# Patient Record
Sex: FEMALE | Race: WHITE | NOT HISPANIC OR LATINO | Employment: OTHER | URBAN - METROPOLITAN AREA
[De-identification: names, ages, dates, MRNs, and addresses within clinical notes are randomized per-mention and may not be internally consistent; named-entity substitution may affect disease eponyms.]

---

## 2017-01-18 ENCOUNTER — RECORDS - HEALTHEAST (OUTPATIENT)
Dept: LAB | Facility: CLINIC | Age: 77
End: 2017-01-18

## 2017-01-18 LAB
CHOLEST SERPL-MCNC: 198 MG/DL
FASTING STATUS PATIENT QL REPORTED: NORMAL
HDLC SERPL-MCNC: 51 MG/DL
LDLC SERPL CALC-MCNC: 120 MG/DL
TRIGL SERPL-MCNC: 134 MG/DL

## 2017-02-22 ENCOUNTER — COMMUNICATION - HEALTHEAST (OUTPATIENT)
Dept: VASCULAR SURGERY | Facility: CLINIC | Age: 77
End: 2017-02-22

## 2017-05-11 ENCOUNTER — HOSPITAL ENCOUNTER (OUTPATIENT)
Dept: MAMMOGRAPHY | Facility: HOSPITAL | Age: 77
Discharge: HOME | End: 2017-05-11
Attending: OBSTETRICS & GYNECOLOGY

## 2017-05-11 DIAGNOSIS — Z12.31 VISIT FOR SCREENING MAMMOGRAM: ICD-10-CM

## 2018-01-22 ENCOUNTER — RECORDS - HEALTHEAST (OUTPATIENT)
Dept: LAB | Facility: CLINIC | Age: 78
End: 2018-01-22

## 2018-01-23 LAB
ALBUMIN SERPL-MCNC: 3.9 G/DL (ref 3.5–5)
ALP SERPL-CCNC: 77 U/L (ref 45–120)
ALT SERPL W P-5'-P-CCNC: 23 U/L (ref 0–45)
ANION GAP SERPL CALCULATED.3IONS-SCNC: 9 MMOL/L (ref 5–18)
AST SERPL W P-5'-P-CCNC: 22 U/L (ref 0–40)
BILIRUB SERPL-MCNC: 0.5 MG/DL (ref 0–1)
BUN SERPL-MCNC: 16 MG/DL (ref 8–28)
CALCIUM SERPL-MCNC: 9.9 MG/DL (ref 8.5–10.5)
CHLORIDE BLD-SCNC: 104 MMOL/L (ref 98–107)
CHOLEST SERPL-MCNC: 165 MG/DL
CO2 SERPL-SCNC: 28 MMOL/L (ref 22–31)
CREAT SERPL-MCNC: 0.74 MG/DL (ref 0.6–1.1)
ERYTHROCYTE [DISTWIDTH] IN BLOOD BY AUTOMATED COUNT: 12.7 % (ref 11–14.5)
FASTING STATUS PATIENT QL REPORTED: ABNORMAL
GFR SERPL CREATININE-BSD FRML MDRD: >60 ML/MIN/1.73M2
GLUCOSE BLD-MCNC: 102 MG/DL (ref 70–125)
HCT VFR BLD AUTO: 44.6 % (ref 35–47)
HDLC SERPL-MCNC: 49 MG/DL
HGB BLD-MCNC: 14.6 G/DL (ref 12–16)
LDLC SERPL CALC-MCNC: 94 MG/DL
MCH RBC QN AUTO: 31.5 PG (ref 27–34)
MCHC RBC AUTO-ENTMCNC: 32.7 G/DL (ref 32–36)
MCV RBC AUTO: 96 FL (ref 80–100)
PLATELET # BLD AUTO: 239 THOU/UL (ref 140–440)
PMV BLD AUTO: 11.7 FL (ref 8.5–12.5)
POTASSIUM BLD-SCNC: 4.5 MMOL/L (ref 3.5–5)
PROT SERPL-MCNC: 6.9 G/DL (ref 6–8)
RBC # BLD AUTO: 4.64 MILL/UL (ref 3.8–5.4)
SODIUM SERPL-SCNC: 141 MMOL/L (ref 136–145)
TRIGL SERPL-MCNC: 110 MG/DL
TSH SERPL DL<=0.005 MIU/L-ACNC: 1.87 UIU/ML (ref 0.3–5)
WBC: 6.8 THOU/UL (ref 4–11)

## 2018-06-01 ENCOUNTER — RECORDS - HEALTHEAST (OUTPATIENT)
Dept: ADMINISTRATIVE | Facility: OTHER | Age: 78
End: 2018-06-01

## 2018-06-01 ENCOUNTER — HOSPITAL ENCOUNTER (OUTPATIENT)
Dept: MAMMOGRAPHY | Facility: CLINIC | Age: 78
Discharge: HOME | End: 2018-06-01
Attending: FAMILY MEDICINE

## 2018-06-01 ENCOUNTER — HOSPITAL ENCOUNTER (OUTPATIENT)
Dept: CARDIOLOGY | Facility: HOSPITAL | Age: 78
Discharge: HOME | End: 2018-06-01
Attending: FAMILY MEDICINE

## 2018-06-01 DIAGNOSIS — R00.2 PALPITATIONS: ICD-10-CM

## 2018-06-01 DIAGNOSIS — Z12.31 VISIT FOR SCREENING MAMMOGRAM: ICD-10-CM

## 2018-11-02 ENCOUNTER — RECORDS - HEALTHEAST (OUTPATIENT)
Dept: LAB | Facility: CLINIC | Age: 78
End: 2018-11-02

## 2018-11-03 LAB — BACTERIA SPEC CULT: NO GROWTH

## 2019-03-12 ENCOUNTER — RECORDS - HEALTHEAST (OUTPATIENT)
Dept: LAB | Facility: CLINIC | Age: 79
End: 2019-03-12

## 2019-03-14 LAB — BACTERIA SPEC CULT: NORMAL

## 2019-03-20 ENCOUNTER — RECORDS - HEALTHEAST (OUTPATIENT)
Dept: LAB | Facility: CLINIC | Age: 79
End: 2019-03-20

## 2019-03-20 LAB
ALBUMIN SERPL-MCNC: 3.7 G/DL (ref 3.5–5)
ALP SERPL-CCNC: 83 U/L (ref 45–120)
ALT SERPL W P-5'-P-CCNC: 46 U/L (ref 0–45)
ANION GAP SERPL CALCULATED.3IONS-SCNC: 8 MMOL/L (ref 5–18)
AST SERPL W P-5'-P-CCNC: 29 U/L (ref 0–40)
BASOPHILS # BLD AUTO: 0 THOU/UL (ref 0–0.2)
BASOPHILS NFR BLD AUTO: 0 % (ref 0–2)
BILIRUB SERPL-MCNC: 0.6 MG/DL (ref 0–1)
BUN SERPL-MCNC: 16 MG/DL (ref 8–28)
CALCIUM SERPL-MCNC: 9 MG/DL (ref 8.5–10.5)
CHLORIDE BLD-SCNC: 107 MMOL/L (ref 98–107)
CHOLEST SERPL-MCNC: 171 MG/DL
CO2 SERPL-SCNC: 27 MMOL/L (ref 22–31)
CREAT SERPL-MCNC: 0.76 MG/DL (ref 0.6–1.1)
EOSINOPHIL # BLD AUTO: 0.2 THOU/UL (ref 0–0.4)
EOSINOPHIL NFR BLD AUTO: 2 % (ref 0–6)
ERYTHROCYTE [DISTWIDTH] IN BLOOD BY AUTOMATED COUNT: 12.9 % (ref 11–14.5)
FASTING STATUS PATIENT QL REPORTED: YES
GFR SERPL CREATININE-BSD FRML MDRD: >60 ML/MIN/1.73M2
GLUCOSE BLD-MCNC: 97 MG/DL (ref 70–125)
HCT VFR BLD AUTO: 44.2 % (ref 35–47)
HDLC SERPL-MCNC: 45 MG/DL
HGB BLD-MCNC: 14.6 G/DL (ref 12–16)
LDLC SERPL CALC-MCNC: 95 MG/DL
LYMPHOCYTES # BLD AUTO: 2.5 THOU/UL (ref 0.8–4.4)
LYMPHOCYTES NFR BLD AUTO: 39 % (ref 20–40)
MCH RBC QN AUTO: 31.4 PG (ref 27–34)
MCHC RBC AUTO-ENTMCNC: 33 G/DL (ref 32–36)
MCV RBC AUTO: 95 FL (ref 80–100)
MONOCYTES # BLD AUTO: 0.5 THOU/UL (ref 0–0.9)
MONOCYTES NFR BLD AUTO: 8 % (ref 2–10)
NEUTROPHILS # BLD AUTO: 3.2 THOU/UL (ref 2–7.7)
NEUTROPHILS NFR BLD AUTO: 50 % (ref 50–70)
PLATELET # BLD AUTO: 232 THOU/UL (ref 140–440)
PMV BLD AUTO: 10.9 FL (ref 8.5–12.5)
POTASSIUM BLD-SCNC: 4.2 MMOL/L (ref 3.5–5)
PROT SERPL-MCNC: 6.5 G/DL (ref 6–8)
RBC # BLD AUTO: 4.65 MILL/UL (ref 3.8–5.4)
SODIUM SERPL-SCNC: 142 MMOL/L (ref 136–145)
TRIGL SERPL-MCNC: 156 MG/DL
TSH SERPL DL<=0.005 MIU/L-ACNC: 1.99 UIU/ML (ref 0.3–5)
WBC: 6.3 THOU/UL (ref 4–11)

## 2019-07-01 ENCOUNTER — RECORDS - HEALTHEAST (OUTPATIENT)
Dept: LAB | Facility: CLINIC | Age: 79
End: 2019-07-01

## 2019-07-02 LAB — B BURGDOR IGG+IGM SER QL: 0.07 INDEX VALUE

## 2019-10-12 ENCOUNTER — HOSPITAL ENCOUNTER (OUTPATIENT)
Dept: CT IMAGING | Facility: HOSPITAL | Age: 79
Discharge: HOME | End: 2019-10-12

## 2019-10-12 DIAGNOSIS — R10.32 LLQ ABDOMINAL PAIN: ICD-10-CM

## 2019-10-12 LAB
CREAT BLD-MCNC: 0.7 MG/DL (ref 0.6–1.1)
GFR SERPL CREATININE-BSD FRML MDRD: >60 ML/MIN/1.73M2

## 2019-10-12 ASSESSMENT — MIFFLIN-ST. JEOR: SCORE: 1151.74

## 2020-01-29 ENCOUNTER — AMBULATORY - HEALTHEAST (OUTPATIENT)
Dept: CARDIOLOGY | Facility: CLINIC | Age: 80
End: 2020-01-29

## 2020-01-29 ENCOUNTER — RECORDS - HEALTHEAST (OUTPATIENT)
Dept: ADMINISTRATIVE | Facility: OTHER | Age: 80
End: 2020-01-29

## 2020-02-03 ENCOUNTER — AMBULATORY - HEALTHEAST (OUTPATIENT)
Dept: CARDIOLOGY | Facility: CLINIC | Age: 80
End: 2020-02-03

## 2020-02-03 ENCOUNTER — RECORDS - HEALTHEAST (OUTPATIENT)
Dept: ADMINISTRATIVE | Facility: OTHER | Age: 80
End: 2020-02-03

## 2020-02-06 ENCOUNTER — OFFICE VISIT - HEALTHEAST (OUTPATIENT)
Dept: CARDIOLOGY | Facility: CLINIC | Age: 80
End: 2020-02-06

## 2020-02-06 DIAGNOSIS — R06.09 DYSPNEA ON EXERTION: ICD-10-CM

## 2020-02-06 DIAGNOSIS — R00.2 PALPITATIONS: ICD-10-CM

## 2020-02-06 DIAGNOSIS — I49.3 PVC'S (PREMATURE VENTRICULAR CONTRACTIONS): ICD-10-CM

## 2020-02-06 RX ORDER — LEVOTHYROXINE SODIUM 100 UG/1
100 TABLET ORAL
Status: SHIPPED | COMMUNITY
Start: 2011-08-09

## 2020-02-06 RX ORDER — B-COMPLEX WITH VITAMIN C
1 TABLET ORAL
Status: SHIPPED | COMMUNITY
Start: 2011-08-09

## 2020-02-06 RX ORDER — MAGNESIUM 200 MG
200 TABLET ORAL
Status: SHIPPED | COMMUNITY
Start: 2012-08-15 | End: 2023-03-27

## 2020-02-06 RX ORDER — ATORVASTATIN CALCIUM 10 MG/1
5 TABLET, FILM COATED ORAL AT BEDTIME
Status: SHIPPED | COMMUNITY
Start: 2012-08-15

## 2020-02-06 RX ORDER — MULTIPLE VITAMINS W/ MINERALS TAB 9MG-400MCG
1 TAB ORAL
Status: SHIPPED | COMMUNITY
Start: 2011-08-09

## 2020-02-06 ASSESSMENT — MIFFLIN-ST. JEOR: SCORE: 1158.54

## 2020-02-12 ENCOUNTER — HOSPITAL ENCOUNTER (OUTPATIENT)
Dept: CARDIOLOGY | Facility: HOSPITAL | Age: 80
Discharge: HOME | End: 2020-02-12
Attending: INTERNAL MEDICINE

## 2020-02-12 DIAGNOSIS — R06.09 OTHER FORMS OF DYSPNEA: ICD-10-CM

## 2020-02-12 DIAGNOSIS — I49.3 PVC'S (PREMATURE VENTRICULAR CONTRACTIONS): ICD-10-CM

## 2020-02-12 DIAGNOSIS — R00.2 PALPITATIONS: ICD-10-CM

## 2020-02-12 DIAGNOSIS — R06.09 DYSPNEA ON EXERTION: ICD-10-CM

## 2020-02-12 LAB
CV STRESS CURRENT BP HE: NORMAL
CV STRESS CURRENT HR HE: 103
CV STRESS CURRENT HR HE: 103
CV STRESS CURRENT HR HE: 108
CV STRESS CURRENT HR HE: 114
CV STRESS CURRENT HR HE: 119
CV STRESS CURRENT HR HE: 119
CV STRESS CURRENT HR HE: 120
CV STRESS CURRENT HR HE: 121
CV STRESS CURRENT HR HE: 121
CV STRESS CURRENT HR HE: 73
CV STRESS CURRENT HR HE: 73
CV STRESS CURRENT HR HE: 74
CV STRESS CURRENT HR HE: 75
CV STRESS CURRENT HR HE: 76
CV STRESS CURRENT HR HE: 76
CV STRESS CURRENT HR HE: 77
CV STRESS CURRENT HR HE: 80
CV STRESS CURRENT HR HE: 82
CV STRESS CURRENT HR HE: 83
CV STRESS CURRENT HR HE: 92
CV STRESS CURRENT HR HE: 95
CV STRESS DEVIATION TIME HE: NORMAL
CV STRESS ECHO PERCENT HR HE: NORMAL
CV STRESS EXERCISE STAGE HE: NORMAL
CV STRESS FINAL RESTING BP HE: NORMAL
CV STRESS FINAL RESTING HR HE: 73
CV STRESS MAX HR HE: 124
CV STRESS MAX TREADMILL GRADE HE: 12
CV STRESS MAX TREADMILL SPEED HE: 2.5
CV STRESS PEAK DIA BP HE: NORMAL
CV STRESS PEAK SYS BP HE: NORMAL
CV STRESS PHASE HE: NORMAL
CV STRESS PROTOCOL HE: NORMAL
CV STRESS RESTING PT POSITION HE: NORMAL
CV STRESS RESTING PT POSITION HE: NORMAL
CV STRESS ST DEVIATION AMOUNT HE: NORMAL
CV STRESS ST DEVIATION ELEVATION HE: NORMAL
CV STRESS ST EVELATION AMOUNT HE: NORMAL
CV STRESS TEST TYPE HE: NORMAL
CV STRESS TOTAL STAGE TIME MIN 1 HE: NORMAL
ECHO EJECTION FRACTION ESTIMATED: 60 %
RATE PRESSURE PRODUCT: NORMAL
STRESS ECHO BASELINE DIASTOLIC HE: 90
STRESS ECHO BASELINE HR: 83
STRESS ECHO BASELINE SYSTOLIC BP: 146
STRESS ECHO CALCULATED PERCENT HR: 87 %
STRESS ECHO LAST STRESS DIASTOLIC BP: 80
STRESS ECHO LAST STRESS HR: 119
STRESS ECHO LAST STRESS SYSTOLIC BP: 172
STRESS ECHO POST ESTIMATED WORKLOAD: 7.1
STRESS ECHO POST EXERCISE DUR MIN: 5
STRESS ECHO POST EXERCISE DUR SEC: 58
STRESS ECHO TARGET HR: 143

## 2020-11-04 ENCOUNTER — OFFICE VISIT - HEALTHEAST (OUTPATIENT)
Dept: VASCULAR SURGERY | Facility: CLINIC | Age: 80
End: 2020-11-04

## 2020-11-04 ENCOUNTER — RECORDS - HEALTHEAST (OUTPATIENT)
Dept: VASCULAR ULTRASOUND | Facility: CLINIC | Age: 80
End: 2020-11-04

## 2020-11-04 DIAGNOSIS — I87.2 VENOUS INSUFFICIENCY OF BOTH LOWER EXTREMITIES: ICD-10-CM

## 2020-11-04 DIAGNOSIS — I87.2 VENOUS INSUFFICIENCY (CHRONIC) (PERIPHERAL): ICD-10-CM

## 2020-11-04 DIAGNOSIS — I83.893 VARICOSE VEINS OF BILATERAL LOWER EXTREMITIES WITH OTHER COMPLICATIONS: ICD-10-CM

## 2020-11-04 DIAGNOSIS — I83.91 ASYMPTOMATIC VARICOSE VEINS OF RIGHT LOWER EXTREMITY: ICD-10-CM

## 2020-11-04 DIAGNOSIS — I83.91 SPIDER VEIN OF RIGHT LOWER EXTREMITY: ICD-10-CM

## 2020-11-04 DIAGNOSIS — I83.893 SYMPTOMATIC VARICOSE VEINS OF BOTH LOWER EXTREMITIES: ICD-10-CM

## 2020-11-04 RX ORDER — MAGNESIUM 200 MG
TABLET ORAL
Status: SHIPPED | COMMUNITY
Start: 2020-11-04 | End: 2023-03-11

## 2020-11-04 ASSESSMENT — MIFFLIN-ST. JEOR: SCORE: 1157.64

## 2020-11-16 ENCOUNTER — COMMUNICATION - HEALTHEAST (OUTPATIENT)
Dept: VASCULAR SURGERY | Facility: CLINIC | Age: 80
End: 2020-11-16

## 2020-12-21 ENCOUNTER — COMMUNICATION - HEALTHEAST (OUTPATIENT)
Dept: VASCULAR SURGERY | Facility: CLINIC | Age: 80
End: 2020-12-21

## 2021-01-05 ENCOUNTER — COMMUNICATION - HEALTHEAST (OUTPATIENT)
Dept: VASCULAR SURGERY | Facility: CLINIC | Age: 81
End: 2021-01-05

## 2021-01-12 ENCOUNTER — RECORDS - HEALTHEAST (OUTPATIENT)
Dept: VASCULAR ULTRASOUND | Facility: CLINIC | Age: 81
End: 2021-01-12

## 2021-01-12 DIAGNOSIS — I83.893 VARICOSE VEINS OF BILATERAL LOWER EXTREMITIES WITH OTHER COMPLICATIONS: ICD-10-CM

## 2021-01-12 DIAGNOSIS — I87.2 VENOUS INSUFFICIENCY (CHRONIC) (PERIPHERAL): ICD-10-CM

## 2021-02-10 ENCOUNTER — AMBULATORY - HEALTHEAST (OUTPATIENT)
Dept: NURSING | Facility: CLINIC | Age: 81
End: 2021-02-10

## 2021-03-03 ENCOUNTER — AMBULATORY - HEALTHEAST (OUTPATIENT)
Dept: NURSING | Facility: CLINIC | Age: 81
End: 2021-03-03

## 2021-03-16 ENCOUNTER — OFFICE VISIT - HEALTHEAST (OUTPATIENT)
Dept: VASCULAR SURGERY | Facility: CLINIC | Age: 81
End: 2021-03-16

## 2021-03-16 ENCOUNTER — RECORDS - HEALTHEAST (OUTPATIENT)
Dept: VASCULAR ULTRASOUND | Facility: CLINIC | Age: 81
End: 2021-03-16

## 2021-03-16 DIAGNOSIS — I87.2 VENOUS INSUFFICIENCY (CHRONIC) (PERIPHERAL): ICD-10-CM

## 2021-03-16 DIAGNOSIS — I83.893 VARICOSE VEINS OF BILATERAL LOWER EXTREMITIES WITH OTHER COMPLICATIONS: ICD-10-CM

## 2021-03-16 DIAGNOSIS — I83.91 SPIDER VEIN OF RIGHT LOWER EXTREMITY: ICD-10-CM

## 2021-03-16 DIAGNOSIS — I83.893 SYMPTOMATIC VARICOSE VEINS OF BOTH LOWER EXTREMITIES: ICD-10-CM

## 2021-04-01 ENCOUNTER — COMMUNICATION - HEALTHEAST (OUTPATIENT)
Dept: VASCULAR SURGERY | Facility: CLINIC | Age: 81
End: 2021-04-01

## 2021-05-03 ENCOUNTER — RECORDS - HEALTHEAST (OUTPATIENT)
Dept: LAB | Facility: CLINIC | Age: 81
End: 2021-05-03

## 2021-05-03 LAB
ALBUMIN SERPL-MCNC: 3.8 G/DL (ref 3.5–5)
ALP SERPL-CCNC: 76 U/L (ref 45–120)
ALT SERPL W P-5'-P-CCNC: 25 U/L (ref 0–45)
ANION GAP SERPL CALCULATED.3IONS-SCNC: 9 MMOL/L (ref 5–18)
AST SERPL W P-5'-P-CCNC: 20 U/L (ref 0–40)
BASOPHILS # BLD AUTO: 0 THOU/UL (ref 0–0.2)
BASOPHILS NFR BLD AUTO: 0 % (ref 0–2)
BILIRUB SERPL-MCNC: 0.6 MG/DL (ref 0–1)
BUN SERPL-MCNC: 18 MG/DL (ref 8–28)
CALCIUM SERPL-MCNC: 8.9 MG/DL (ref 8.5–10.5)
CHLORIDE BLD-SCNC: 108 MMOL/L (ref 98–107)
CHOLEST SERPL-MCNC: 161 MG/DL
CO2 SERPL-SCNC: 26 MMOL/L (ref 22–31)
CREAT SERPL-MCNC: 0.74 MG/DL (ref 0.6–1.1)
EOSINOPHIL # BLD AUTO: 0.2 THOU/UL (ref 0–0.4)
EOSINOPHIL NFR BLD AUTO: 3 % (ref 0–6)
ERYTHROCYTE [DISTWIDTH] IN BLOOD BY AUTOMATED COUNT: 13 % (ref 11–14.5)
FASTING STATUS PATIENT QL REPORTED: NO
GFR SERPL CREATININE-BSD FRML MDRD: >60 ML/MIN/1.73M2
GLUCOSE BLD-MCNC: 130 MG/DL (ref 70–125)
HCT VFR BLD AUTO: 44 % (ref 35–47)
HDLC SERPL-MCNC: 45 MG/DL
HGB BLD-MCNC: 14.6 G/DL (ref 12–16)
IMM GRANULOCYTES # BLD: 0 THOU/UL
IMM GRANULOCYTES NFR BLD: 0 %
LDLC SERPL CALC-MCNC: 94 MG/DL
LYMPHOCYTES # BLD AUTO: 2.4 THOU/UL (ref 0.8–4.4)
LYMPHOCYTES NFR BLD AUTO: 40 % (ref 20–40)
MCH RBC QN AUTO: 31.7 PG (ref 27–34)
MCHC RBC AUTO-ENTMCNC: 33.2 G/DL (ref 32–36)
MCV RBC AUTO: 96 FL (ref 80–100)
MONOCYTES # BLD AUTO: 0.4 THOU/UL (ref 0–0.9)
MONOCYTES NFR BLD AUTO: 7 % (ref 2–10)
NEUTROPHILS # BLD AUTO: 2.8 THOU/UL (ref 2–7.7)
NEUTROPHILS NFR BLD AUTO: 49 % (ref 50–70)
PLATELET # BLD AUTO: 196 THOU/UL (ref 140–440)
PMV BLD AUTO: 11.9 FL (ref 8.5–12.5)
POTASSIUM BLD-SCNC: 4.1 MMOL/L (ref 3.5–5)
PROT SERPL-MCNC: 6.4 G/DL (ref 6–8)
RBC # BLD AUTO: 4.6 MILL/UL (ref 3.8–5.4)
SODIUM SERPL-SCNC: 143 MMOL/L (ref 136–145)
TRIGL SERPL-MCNC: 111 MG/DL
TSH SERPL DL<=0.005 MIU/L-ACNC: 1.62 UIU/ML (ref 0.3–5)
WBC: 5.9 THOU/UL (ref 4–11)

## 2021-05-22 ENCOUNTER — HEALTH MAINTENANCE LETTER (OUTPATIENT)
Age: 81
End: 2021-05-22

## 2021-05-25 ENCOUNTER — RECORDS - HEALTHEAST (OUTPATIENT)
Dept: ADMINISTRATIVE | Facility: CLINIC | Age: 81
End: 2021-05-25

## 2021-05-26 ENCOUNTER — RECORDS - HEALTHEAST (OUTPATIENT)
Dept: ADMINISTRATIVE | Facility: CLINIC | Age: 81
End: 2021-05-26

## 2021-05-27 ENCOUNTER — RECORDS - HEALTHEAST (OUTPATIENT)
Dept: ADMINISTRATIVE | Facility: CLINIC | Age: 81
End: 2021-05-27

## 2021-05-27 VITALS — RESPIRATION RATE: 12 BRPM

## 2021-05-28 ENCOUNTER — RECORDS - HEALTHEAST (OUTPATIENT)
Dept: ADMINISTRATIVE | Facility: CLINIC | Age: 81
End: 2021-05-28

## 2021-05-29 ENCOUNTER — RECORDS - HEALTHEAST (OUTPATIENT)
Dept: ADMINISTRATIVE | Facility: CLINIC | Age: 81
End: 2021-05-29

## 2021-05-30 ENCOUNTER — RECORDS - HEALTHEAST (OUTPATIENT)
Dept: ADMINISTRATIVE | Facility: CLINIC | Age: 81
End: 2021-05-30

## 2021-05-31 ENCOUNTER — RECORDS - HEALTHEAST (OUTPATIENT)
Dept: ADMINISTRATIVE | Facility: CLINIC | Age: 81
End: 2021-05-31

## 2021-06-03 VITALS — HEIGHT: 65 IN | BODY MASS INDEX: 24.83 KG/M2 | WEIGHT: 149 LBS

## 2021-06-04 VITALS
BODY MASS INDEX: 26.29 KG/M2 | WEIGHT: 154 LBS | HEIGHT: 64 IN | SYSTOLIC BLOOD PRESSURE: 110 MMHG | DIASTOLIC BLOOD PRESSURE: 66 MMHG | RESPIRATION RATE: 16 BRPM | HEART RATE: 76 BPM

## 2021-06-05 VITALS
HEIGHT: 65 IN | BODY MASS INDEX: 25.04 KG/M2 | HEART RATE: 72 BPM | RESPIRATION RATE: 12 BRPM | SYSTOLIC BLOOD PRESSURE: 130 MMHG | WEIGHT: 150.3 LBS | DIASTOLIC BLOOD PRESSURE: 60 MMHG | TEMPERATURE: 97.9 F

## 2021-06-09 ENCOUNTER — RECORDS - HEALTHEAST (OUTPATIENT)
Dept: ADMINISTRATIVE | Facility: CLINIC | Age: 81
End: 2021-06-09

## 2021-06-12 NOTE — PROGRESS NOTES
saw patient last in 2012, new pain in leg (no redness or swelling).  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 History of RFA and stab phlebectomy.  2008 right GSV RFA,2012 right trivex vein surgery.Has used compression with continued pain,swelling,VV and spider veins.US done and reviewed options.. right GSV RFA option reviewed and will preauth through [facility]. Spoke with patient regading RFA.

## 2021-06-13 NOTE — TELEPHONE ENCOUNTER
Pt left VM wondering what she should be doing for a f/u appt and compression stocking question.    Called pt back and stated that Melida note said follow up if procedure is approved. Pt states understanding that she will get a call if approved to set up. Pt wondering if she needs compression due to the fact that she already has numerous pairs of thigh high and knee high. Informed pt that we recommend changing pairs out every 6 months. Pt states understanding.

## 2021-06-13 NOTE — TELEPHONE ENCOUNTER
Nataliia called and was wondering where she can go to get compression stockings. She has the [employer] catalog but spoke with them and they do not take her insurance. She would like to see if her insurance will cover part of her stocking cost.    Reviewed options of where to go for compression stockings. She would like the [place] location. Gave contact information for:    [place]/ [facility]   [address]   [address]/Orthotics and [address]st[address]  [place] [address]             Phone: [phone]  Fax [phone]

## 2021-06-13 NOTE — TELEPHONE ENCOUNTER
Pt is scheduled for R leg ablation on 1/12. She will need a cortisone injection that same month. She is wondering if it will matter if she gets it the week before or after the ablation. Please advise.

## 2021-06-14 NOTE — PROGRESS NOTES
VNUS Radiofrequency Ablation    Pre-Procedure:  Admit  [x]Consent for Radio Frequency Ablation of the   [x]Right Saphenous Vein and/or branches  []Left Saphenous Vein and/or branches  Vitals  [x]Vital signs per routine    Nursing Orders  [x]Vein Mapping of  [x]R extremity  []L extremity  [x]Sterile Prep to extremity  [x]Obtain Tumescent Solution 500mL (NS 1000mL, 1% Lidocaine w Epi 56mL, 8.4% Sodium Bicarbonate 5.6mL)    Medications  [x]Diazepam (Valium) 5mg PO, May Repeat x 1 for anxiety, relaxtion.    Post-Procedure:  Admission  [x]Post Procedure care - call physician for status update  []Admit to inpatient - Please document reason for admission in chart    Interventions require inpatient services    High risk of deterioration due to comorbidities    High risk of deterioration due to nature of admitting diagnosis  Location:    Vitals  [x]Vital signs per routine    Nursing Orders  [x]Thigh High Compression Stocking 20-30mm or 30-40mm to  [x]R extremity  []L extremity  [x]Check insertion site for bleeding or hematoma.  If bleeding or hematoma occurs, apply pressure and notify MD    Discharge  [x]Discharge home if no complications arise.  [x]Give post radiofrequency ablation discharge instructions to patient.  [x]Follow up venous ultrasound 72-96 hours after procedure.  [x]Follow up appointment with MD at 2 weeks.  [x]Contact MD for further questions

## 2021-06-14 NOTE — TELEPHONE ENCOUNTER
Spoke with patient regarding vein ablation procedure next week. Advised to bring a . Explained it is ok to eat and drink prior to procedure with exception of blood thinner. Verified patient's insurance. We will supply patient with a thigh high compression sock. We carry from size medium to extra large. If patient doesn't fit into them they will need to provide their own. Questions answered. Patient will call if any further questions.

## 2021-06-14 NOTE — PATIENT INSTRUCTIONS - HE
Home Care Instructions Following Radiofrequency Closure of the Greater Saphenous Vein (VNUS)    Dressing    Wear your compression for 48 hours continuously until your ultrasound after the procedure.  You can remove your stocking to shower in 24 hours but then reapply after.    Wear the stocking for two weeks after the procedure while you are up.    Activity    The day of the procedure make sure to walk around the house for a few minutes each hour until bedtime.    The day after the procedure you should advance activity as tolerated.  NO strenuous activities though for 2 weeks.  In general avoid prolonged standing in place and sitting with your legs down.  Both of these activities will cause blood to pool in your legs resulting in more swelling and discomfort.    Do not drive or operate heavy machinery for 24 hours after the procedure.    Do not take baths or use hot tubs or swimming pools until your incision site is healed.    Do not fly for the Next 3 weeks.    Post Procedure Ultrasound    You will need an ultrasound within 2-3 days following the closure procedure.  Please schedule an ultrasound if you have not already done so.    Post Procedure Signs and Symptoms    There can be a mild amount of bruising and numbness after this procedure.  This will typically take a few weeks to fade.    You may feel pain or tenderness in your inner thigh.  Mild pain medication such as Tylenol or Ibuprofen maybe helpful.    It is normal to have fluid leaking from the injection areas the day of the procedure and it may be blood tinged.    Call if you have    Fever greater than 100.8 degrees F.    Uncontrolled bleeding from the incision site.    Pain that is not relieved by rest or pain medication.    Shortness of breath    Follow -up    Please plan to see your surgeon in the office two weeks after your procedure    Call [phone] to schedule this appointment if one has not already been made    You will receive a phone call  from our staff within 48 hours of your procedure.  Please have these instruction near by so that any questions can be addressed at that time.  If you have any concerns before that time, please do not hesitate to call us al [phone] and ask to speak to a nurse.  We want you to have a smooth recovery.    For emergencies after 4:30pm Monday - Friday, holidays and weekends:  For Dr Rogers Bowman call [facility] at [phone]  [facility] [phone]    Patient Education   Diazepam Oral tablet  What is this medicine?  DIAZEPAM (dye AZ e rebecca) is a benzodiazepine. It is used to treat anxiety and nervousness. It also can help treat alcohol withdrawal, relax muscles, and treat certain types of seizures.  This medicine may be used for other purposes; ask your health care provider or pharmacist if you have questions.  What should I tell my health care provider before I take this medicine?  They need to know if you have any of these conditions    an alcohol or drug abuse problem    bipolar disorder, depression, psychosis or other mental health condition    glaucoma    kidney or liver disease    lung or breathing disease    myasthenia gravis    Parkinson's disease    seizures or a history of seizures    suicidal thoughts    an unusual or allergic reaction to diazepam, other benzodiazepines, foods, dyes, or preservatives    pregnant or trying to get pregnant    breast-feeding  How should I use this medicine?  Take this medicine by mouth with a glass of water. Follow the directions on the prescription label. If this medicine upsets your stomach, take it with food or milk. Take your doses at regular intervals. Do not take your medicine more often than directed. If you have been taking this medicine regularly for some time, do not suddenly stop taking it. You must gradually reduce the dose or you may get severe side effects. Ask your doctor or health care professional for advice. Even after you stop  taking this medicine it can still affect your body for several days.  Talk to your pediatrician regarding the use of this medicine in children. Special care may be needed.  Overdosage: If you think you have taken too much of this medicine contact a poison control center or emergency room at once.  NOTE: This medicine is only for you. Do not share this medicine with others.  What if I miss a dose?  If you miss a dose, take it as soon as you can. If it is almost time for your next dose, take only that dose. Do not take double or extra doses.  What may interact with this medicine?    cimetidine    grapefruit juice    herbal or dietary supplements like kava kava, melatonin, Dom's Wort, or valerian    medicines for anxiety or sleeping problems, like alprazolam, lorazepam, or triazolam    medicines for depression, mental problems or psychiatric disturbances    medicines for HIV infection or AIDS    prescription pain medicines    rifampin, rifapentine, or rifabutin    some medicines for seizures like carbamazepine, phenobarbital, phenytoin, or primidone  This list may not describe all possible interactions. Give your health care provider a list of all the medicines, herbs, non-prescription drugs, or dietary supplements you use. Also tell them if you smoke, drink alcohol, or use illegal drugs. Some items may interact with your medicine.  What should I watch for while using this medicine?  Visit your doctor or health care professional for regular checks on your progress. Your body can become dependent on this medicine. Ask your doctor or health care professional if you still need to take it.  You may get drowsy or dizzy. Do not drive, use machinery, or do anything that needs mental alertness until you know how this medicine affects you. To reduce the risk of dizzy and fainting spells, do not stand or sit up quickly, especially if you are an older patient. Alcohol may increase dizziness and drowsiness. Avoid alcoholic  drinks.  Do not treat yourself for coughs, colds or allergies without asking your doctor or health care professional for advice. Some ingredients can increase possible side effects.  What side effects may I notice from receiving this medicine?  Side effects that you should report to your doctor or health care professional as soon as possible:    allergic reactions like skin rash, itching or hives, swelling of the face, lips, or tongue    angry, confused, depressed, other mood changes    breathing problems    feeling faint or lightheaded, falls    muscle cramps    problems with balance, talking, walking    restlessness    tremors    trouble passing urine or change in the amount of urine    unusually weak or tired  Side effects that usually do not require medical attention (report to your doctor or health care professional if they continue or are bothersome):    difficulty sleeping, nightmares    dizziness, drowsiness, clumsiness, or unsteadiness, a hangover effect    headache    nausea, vomiting  This list may not describe all possible side effects. Call your doctor for medical advice about side effects. You may report side effects to FDA at 3-725-FDA-1863.  Where should I keep my medicine?  Keep out of the reach of children. This medicine can be abused. Keep your medicine in a safe place to protect it from theft. Do not share this medicine with anyone. Selling or giving away this medicine is dangerous and against the law.  Store at room temperature between 15 and 30 degrees C (59 and 86 degrees F). Protect from light. Keep container tightly closed. Throw away any unused medicine after the expiration date.  NOTE:This sheet is a summary. It may not cover all possible information. If you have questions about this medicine, talk to your doctor, pharmacist, or health care provider. Copyright  2015 Gold Standard

## 2021-06-15 NOTE — PATIENT INSTRUCTIONS - HE
Options of right greater saphenous vein ligation and removal at the junction were discussed.  I do not think this is urgent or emergent and we could just watch this and follow this in and do this in the future if need be.  Continue her compression on a regular basis  Call for questions concerns or issues

## 2021-06-16 PROBLEM — K57.32 DIVERTICULITIS OF COLON: Status: ACTIVE | Noted: 2020-11-04

## 2021-06-16 PROBLEM — I49.3 PVC'S (PREMATURE VENTRICULAR CONTRACTIONS): Status: ACTIVE | Noted: 2020-02-06

## 2021-06-16 PROBLEM — R00.2 PALPITATIONS: Status: ACTIVE | Noted: 2020-02-06

## 2021-06-16 PROBLEM — R06.09 DYSPNEA ON EXERTION: Status: ACTIVE | Noted: 2020-02-06

## 2021-06-16 PROBLEM — I10 BENIGN ESSENTIAL HYPERTENSION: Status: ACTIVE | Noted: 2020-11-04

## 2021-06-16 PROBLEM — M19.90 DEGENERATIVE ARTHRITIS: Status: ACTIVE | Noted: 2020-11-04

## 2021-06-16 PROBLEM — I83.93 VARICOSE VEINS OF BOTH LOWER EXTREMITIES: Status: ACTIVE | Noted: 2020-11-04

## 2021-06-16 PROBLEM — K21.9 GASTROESOPHAGEAL REFLUX DISEASE: Status: ACTIVE | Noted: 2020-11-04

## 2021-06-16 PROBLEM — E78.5 HYPERLIPIDEMIA: Status: ACTIVE | Noted: 2020-11-04

## 2021-06-16 PROBLEM — E03.9 HYPOTHYROIDISM: Status: ACTIVE | Noted: 2020-11-04

## 2021-06-16 PROBLEM — H25.099 SENILE INCIPIENT CATARACT: Status: ACTIVE | Noted: 2020-11-04

## 2021-06-16 NOTE — PROGRESS NOTES
[facility] Surgery Follow up    HPI:    80 y.o. year old female who returns for a follow up.  She had attempted closure on that right greater saphenous vein but were unable to successfully do it secondary to inability to pass a wire or catheter through the greater saphenous vein stenotic areas.  She has a lot of dilated saphenofemoral junction and is here for follow-up ultrasound to see if this is dilated up since her last attempt.  She is wearing compression socks wears knee-high's on a regular basis.    Allergies:Fosamax [alendronate], Metronidazole, and Sulfa (sulfonamide antibiotics)    Past Medical History:   Diagnosis Date     Benign essential hypertension 11/4/2020     Breast cyst 2014     Colon polyp      Degenerative arthritis 11/4/2020     Diverticulitis of colon 11/4/2020     Dyspnea on exertion 2/6/2020     Gastroesophageal reflux disease 11/4/2020     Hyperlipidemia 11/4/2020     Hypothyroidism 11/4/2020     Inverted nipple     hx of inverted nipples     Palpitations 2/6/2020     PVC's (premature ventricular contractions) 2/6/2020     Senile incipient cataract 11/4/2020     Varicose veins of both lower extremities 11/4/2020       Past Surgical History:   Procedure Laterality Date     BREAST BIOPSY Left 2009     BREAST BIOPSY Right 2014    benign     BREAST CYST ASPIRATION Right 2014     TUBAL LIGATION       VARICOSE VEIN SURGERY         CURRENT MEDS:  Current Outpatient Medications on File Prior to Visit   Medication Sig Dispense Refill     ascorbic acid, vitamin C, (VITAMIN C) 1000 MG tablet Take 1,000 mg by mouth daily.       ascorbic acid, vitamin C, (VITAMIN C) 1000 MG tablet 1 tab(s) orally once a day       aspirin 81 MG EC tablet 1       aspirin-calcium carbonate 81 mg-300 mg calcium(777 mg) Tab Take 81 mg by mouth daily.       atorvastatin (LIPITOR) 10 MG tablet Take 5 mg by mouth at bedtime.       calcium carbonate (CALCIUM 600 ORAL) Take 600 mg by mouth daily.       levothyroxine (SYNTHROID,  LEVOTHROID) 100 MCG tablet Take 100 mcg by mouth 4 (four) times a week.       magnesium 200 mg Tab Take 200 mg by mouth daily.       magnesium 200 mg Tab 1 tab(s)       multivitamin with minerals (HAIR,SKIN AND NAILS ORAL) Take 1 tablet by mouth daily.       multivitamin-minerals-lutein (CENTRUM SILVER) tablet Take 1 tablet by mouth daily.       omega 3-dha-epa-fish oil (FISH OIL) 1,000 mg (120 mg-180 mg) cap Take 1 capsule by mouth daily.       omeprazole (PRILOSEC) 20 MG capsule Take 20 mg by mouth 4 (four) times a week.       No current facility-administered medications on file prior to visit.        Family History   Problem Relation Age of Onset     Pancreatic cancer Mother 92     Colon cancer Sister 70     Colon cancer Maternal Grandfather      Prostate cancer Paternal Grandfather      Breast cancer Cousin         paternal half cousin, onset in 30s     Breast cancer Cousin         paternal half cousin, onset in 50s     Breast cancer Cousin 40        paternal half cousin     Skin cancer Brother      Brain cancer Maternal Uncle          at 39     Colon cancer Paternal Uncle      Stomach cancer Cousin 50        paternal half cousin     Cancer Cousin         paternal half cousin, sinus cancer     Breast cancer Cousin         maternal first-cousin, onset 40s     Lung cancer Cousin         maternal first-cousin,  around 50     Lung cancer Cousin         maternal first-cousin     Lung cancer Cousin         materanl first-cousin     Lung cancer Cousin         maternal first-cousin,  at 39     Cancer Maternal Uncle         throat cancer     Cancer Maternal Uncle         type not specified     Skin cancer Brother      Colon cancer Other         paternal half uncle     Pancreatic cancer Other         paternal half uncle     Colon cancer Father         onset in 80s        reports that she has quit smoking. She has never used smokeless tobacco.    Review of Systems:  Negative except occasionally some pain but  her pain is greatly improved since she wears socks on a regular basis.Otherwise twelve system of review is negative.      OBJECTIVE:  There were no vitals filed for this visit.  There is no height or weight on file to calculate BMI.    EXAM:  GENERAL: This is a well-developed 80 y.o. female who appears her stated age  HEAD: normocephalic  VASCULAR: Pulses intact, symmetrical upper and lower extremities.  No major varicosities in the right upper thigh.        LABS:  Lab Results   Component Value Date    WBC 6.3 03/20/2019    WBC 6.1 09/22/2015    HGB 14.6 03/20/2019    HCT 44.2 03/20/2019    MCV 95 03/20/2019     03/20/2019     INR/Prothrombin Time      No results found for: HGBA1C  Lab Results   Component Value Date    ALT 46 (H) 03/20/2019    AST 29 03/20/2019    ALKPHOS 83 03/20/2019    BILITOT 0.6 03/20/2019        Images: Directly reviewed the images as they are being done with the ultrasonographer she has a dilated refluxing greater saphenous junction this gets prestenotic the about 4 to 5 cm distal to the junction and then really does not reconstitute very well for a great length of time most onto the knee.    Assessment/Plan:   Status post attempted closure right greater saphenous vein unsuccessful secondary to stenosis the stenosis still maintains and is there today I do not think that she is a candidate for any kind of closure.  I think differently trying to cure this junction we do so with high ligation of her greater saphenous vein done under local MAC anesthesia in same-day surgery setting.  She has no large varicosities or branches office at this this time I do think we could definitely watch this which is what she would like to do.  We will do so and if this starts to have changes of varicosities or branches in the thigh especially where she can visualize those issues I would like her to return.    Return if symptoms worsen or fail to improve.     Rogers Bowman MD  [facility] Department of  Surgery

## 2021-06-16 NOTE — PROGRESS NOTES
Dr Bowman spoke with patient regarding US done today. Was unable to do RFa at last vist. No RFA option at this time. Is not having any pain. Continues to use compression. No Vein branches noted at this time. Only option is to do vein ablation.

## 2021-06-16 NOTE — TELEPHONE ENCOUNTER
Patient received a voicemail from Constance to call the clinic. She only knows the message was left on a Monday but not sure if it was this week or in the past. She asked for a call back if needed.

## 2021-06-16 NOTE — TELEPHONE ENCOUNTER
Spoke with Nataliia. She was cleaning up her voicemail and found a message she thinks is old from Constance who asked her to call clinic. She thinks it was left on Monday 3/15 before her appointment with Dr. Bowman on 3/16. She is doing fine and has no questions at this time.

## 2021-06-18 NOTE — PATIENT INSTRUCTIONS - HE
"Patient Instructions by Manuel Perez RN at 11/4/2020  8:00 AM     Author: Manuel Perez RN Service: -- Author Type: Registered Nurse    Filed: 11/4/2020  8:56 AM Encounter Date: 11/4/2020 Status: Addendum    : Rogers Bowman MD (Physician)    Related Notes: Original Note by Manuel Perez RN (Registered Nurse) filed at 11/4/2020  8:21 AM       We are prescribing some compression stockings for you. I have included different suppliers that should help you get measured and fitting to ensure proper fitting socks. You should wear this socks as much as you can. It is especially important to wear them with long periods of sitting/standing, long car rides or if you will be flying. Compression socks should get refilled every 4-6 months. They do not need to be worn at night while in bed.    If you do a lot of standing it is good to do calf raises to help keep the blood pumping. If you sit a lot at work it is good to get up periodically to walk around. Elevation of the foot of your bed 4-6\" helps the blood return back to where it is needed.        Varicose Veins      Varicose veins are swollen, enlarged veins most often found in the legs. They are usually blue or purple in color and may bulge, twist, and stand out under the skin.  Normally, veins return blood from the body to the heart. The leg veins have one-way valves that prevent blood from flowing backward in the vein. When the valves are weak or damaged, blood backs up in the veins. This may cause some of the veins to swell and bulge and become varicose veins.  Symptoms  Varicose veins may or may not cause symptoms. If symptoms do occur, they can include:    Legs that feel tired, achy, heavy, or itchy    Leg muscle cramps    Skin changes, such as discoloration, dryness, redness, or rash (in more severe cases, you may also have sores on the skin called venous leg ulcers)  Risk Factors  There are a number of factors that increase the risk for varicose veins. " These can include:    Being a woman    Being older    Sitting or standing for long periods    Being overweight    Being pregnant    Having a family history of varicose veins  Treatment begins with simple self-help measures (see below). If these dont help, there are many procedures that can be done to shrink or remove varicose veins. Your healthcare provider can tell you more about these options, if needed.  Home care    Support or compression stockings will likely be prescribed. If so, be sure to wear them as directed. They may help improve blood flow.    Exercising helps strengthen your leg muscles and improve blood flow. To get the most benefit, choose exercises such as walking, swimming, or cycling. Also try to exercise for at least 30 minutes on most days.    Raising (elevating) your legs lets gravity help blood flow back to the heart. Sit or lie with your feet above heart level a few times throughout the day, or as directed.    Avoid long periods of sitting or standing. Change positions often. Also, move your ankles, toes and knees often. This may also help improve blood flow.    If you are overweight, talk with your healthcare provider about setting up a weight-loss plan. Maintaining a healthy weight can help reduce the strain on your veins. It may also improve symptoms, such as swelling and aching.    If you have dryness and itching, ask your provider about special lotions that can be applied to the skin to help improve symptoms.  Follow-up care  Follow up with your healthcare provider, or as directed. If imaging tests were done, youll be told the results and if there are any new findings that affect your care.  When to seek medical advice  Call your healthcare provider right away if any of these occur:    Sudden, severe leg swelling, pain, or redness    Symptoms worsen, or they dont improve with self-care    Bleeding from any affected veins    Ulcers form on the legs, ankles, or feet    Fever of 100.4 F  (38 C) or higher, or as advised by your provider      Understanding Spider and Varicose Veins  Do you often hide your legs because of the way they look? You may have noticed tiny red or blue bursts (spider veins). Or maybe you have veins that bulge or look twisted (varicose veins). If so, there are treatments that can help  What are the symptoms?  Spider veins or varicose veins may never be a problem. But sometimes they can cause legs to ache or swell. Your legs may also feel heavy and tired, or like theyre burning. These symptoms may be more severe at the end of the day. Prolonged sitting or standing can also make your symptoms worse.  Who gets spider and varicose veins?  Anyone can get spider or varicose veins. But vein problems tend to be hereditary (run in families). Other factors that can affect veins include:    Pregnancy, hormones, and birth control pills    A job where you stand or sit a lot    Extra weight or lack of exercise    Age         Spider veins look like tiny webs on the ankles, legs, and upper thighs.       Ropy, dark blue, red, or flesh-colored varicose veins are most common on the thighs, calves, and feet.    What can be done?  Spider and varicose veins can affect the way you feel about yourself. Talk to your healthcare provider about your concerns. There are treatments that can ease symptoms and make your legs look better.  Your treatment choices  Treatment may include self-care, sclerotherapy (injecting veins with a chemical), surgery, or newer nonsurgical minimally invasive therapies. Spider veins and some varicose veins can be treated with sclerotherapy. Large varicose veins can often be treated with newer minimally invasive procedures and, in rare cases, surgery may be needed.     Please call [facility] to schedule an appointment. If you received a prescription please bring it with you to your appointment. You may call one of the locations below, although some  locations are limited to what they carry.    Office Locations  New Locations  [facility]  Home Medical Equipment  [address] [facility] [address], [place] [address]  Orthotics [facility]sthetics ([facility])  [address], [place] [address]  Phone [phone] /Fax [phone]        [facility]   [address]   Medical Equipment [address]/Orthotics and Prosthetics [address]  [place] [address]   Phone: [phone]  Fax [phone]    [facility]  [address]  [place] [address]  Phone: [phone]  Fax: [phone]    [facility] Medical [facility]ld[facility]   [address]  [place] [address]  Phone: [phone]  Fax: [phone]    [facility] Professional Bldg.  [address]  [place] [address]  Phone: [phone]  Fax: [phone]    [facility]  [address]  [place] [address]  Phone: [phone]  Fax: [phone]    [facility]  [address] [place] [address] [address]   [place] [address]   Phone: [phone]  Fax: [phone]    [place]   [address] [place] [address]   Phone: [phone]  Fax: [phone]          [facility]   [address]             [address]                 [address]     [place] [address]      [place] [address]         [place] [address]  [phone]  [phone]     [phone]               Fax: [phone]  [email or web address]                                                     [facility]  [address]  [place] [address]  [phone]  [phone]  [email or web address]    Shyanne  Kulwant  [phone]  Localist.[employer] supply   [phone]    [facility]  [address] [place] [address]    [phone]    Laser Vein Therapy Information    Laser vein therapy uses a laser to diminish the appearance of spider veins quickly, safely and effectively without injections. It works by delivering pulses of light energy causing the blood within the vein to gel, destroying the vessel, which is reabsorbed by your body.      Laser vein therapy is ideal for small facial veins, spider veins and blue leg veins. Laser vein therapy is generally performed on the legs and face.    We recommend at least two to three treatments. The number necessary depends on the number, color and size of the vessels being treated.  Treatment is given by a nurse certified in laser therapy. She has received special training in diminishing the appearance of spider and facial veins.  Please no use of self-tanners before treatments.    What to expect during the procedure:    You will be given protective eyewear for the laser treatment.    You will feel a cold sensation over the area to be treated.    When the laser shines on the skin, you will experience a stinging sensation.    There may be some minor discomfort immediately following treatment.    You may have some redness and slight bruising.    The majority of the treated veins show improvement within two to six weeks of treatment. However, your final results may not be apparent for several months.       Over time it is possible for new veins to appear. These new veins can be treated as needed.    Recommendations after treatment    Wear SPF 30 or greater on all treated areas that are exposed to the sun.    Do not exercise heavily for the two to three days following treatment.    Wear compression for the two to three days following treatment.    To help reduce swelling and redness, apply cold packs to the area as needed for five to ten minutes every one to  two hours after treatment.    Acetaminophen may be used for discomfort if necessary.    Post-inflammatory hyperpigmentation is common and tends to resolve over time.    The area might be raised after treatment. This will resolve over time.    Do not soak in a warm bath, sauna or hot tub for the next two to three days following treatment.    Do not have laser therapy if you:    Are pregnant or breastfeeding.    Have an active cold sore    Follow Up    It is necessary to wait at least four to six weeks between treatments.    We recommend at least three to four treatments for optimal results.    Please call if you have any questions.    SCLEROTHERAPY  Dr. Savannah Caldera  [phone]  Varicose Vein Pre-Procedure Instructions/Vein Ablation     You are scheduled for a varicose vein treatment on your legs. The following is some helpful information for you, in regards to your treatment.    **Important:  A  will be needed post procedure.  (Unable to use Taxi or Uber).     We will supply a thigh high compression stocking for you. Please bring your compression sock as we only have sizes medium to X-large.    Please be aware, it is not advised to fly within 3 weeks post procedure    Please wear comfortable clothing.  We recommend that you bring a change of under clothes; they may get stained by the cleansing solution.    Feel free to bring a personal music player or a CD to listen to during your procedure.    Take your routine medications as you normally would with exception to blood thinners. Aspirin is ok to continue.    If you take Warfarin, Xarelto, or Eliquis this will need to be HELD prior to procedure according to primary care provider/doctor or cardiology who prescribes this medication.    Please notify us if you take this medication.    It is ok to eat prior to this procedure.    Please allow 1- 2 hours for your appointment.    For any questions regarding your procedure please call   [phone] to speak with the  nurse.    If you would like a Good [place] Estimate for your upcoming service/procedure contact Cost of Care Estimates at [phone], advocates are available Monday through Friday 8am - 5pm.    Radiofrequency Ablation Codes  94105 for first vein in either leg  04002 for second vein    Please have the following information available:  1. Patient name and date of birth  2. Insurance company, plan name, ID and group numbers  3. Description of the service/procedure and the associated procedure code numbers, if available. If more than one (1) procedure code, indicate which will be the primary procedure code.   4. The facility where the service/procedure will be performed.  5. The name of the physician involved with the service/procedure.  6. Appointment date of service.  7. Telephone number to call with the information.    Radiofrequency Ablation (RFA) Treatment for Varicose Veins    Radiofrequency ablation (RFA) is a procedure to treat varicose veins. It uses heat created from radiofrequency (RF).    Varicose veins are swollen, enlarged veins. They happen most often in the legs. Varicose veins can develop when valves in your veins become damaged. This causes problems with blood flow. Over time, too much blood collects in your veins. The veins may bulge, twist, and stand out under your skin. They can also cause symptoms such as aching, cramping, or swelling in your legs.    During RFA treatment, RF heat is sent into your vein through a thin, flexible tube (catheter). This closes off blood flow in the main problem vein.         With RFA treatment, a catheter that contains RF heat is used to seal off the main problem vein.      Getting ready for your treatment  Follow any instructions from your healthcare provider.    Tell your provider if you:    Are pregnant or think you may be pregnant    Are breastfeeding    Smoke or use alcohol on a regular basis    Have any allergies or intolerances to certain medicines. Explain what  reaction you have had to these medicines in the past.    Tell your provider about any medicines you are taking. You may need to stop taking all or some of these before the test. This includes:    Medicines that can thin your blood or prevent clotting (anticoagulants)    All prescription medicines    Over-the-counter medicines such as aspirin or ibuprofen    Street drugs    Herbs, vitamins, and other supplements    Follow any directions you're given for not eating or drinking before the procedure.    The day of your treatment  The treatment takes 45 to 60 minutes. The entire treatment (including time to prepare and recover) takes about 1 to 3 hours. You can go home the same day. For the treatment:     You'll lie down on a hospital bed.    An imaging method, such as ultrasound, is used to guide the procedure.    The leg to be treated is injected with numbing medicine.    Once your leg is numb, a needle makes a small hole (puncture) in the vein to be treated.    The catheter with the RF heat source is inserted into your vein.    More numbing medicine may be injected around your vein.    Once the catheter is in the right position, it is then slowly drawn backward. As the catheter sends out heat, the vein is closed off.    In some cases, other side branch varicose veins may be removed or tied off through a few small cuts (incisions).    When the treatment is done, the catheter is removed. Pressure is applied to the insertion site to stop any bleeding. An elastic compression stocking or a bandage may then be put on your leg.    Recovering at home  Once at home, follow all the instructions you've been given. Be sure to:    Take all medicines as directed    Care for the catheter insertion site as directed    Check for signs of infection at the catheter insertion site (see below)    Wear elastic stockings or bandages as directed    Keep your legs raised (elevated) as directed    Walk a few times a day    Avoid heavy  exercise, lifting, and standing for long periods as advised    Avoid air travel, hot baths, saunas, or whirlpools as advised    Call your healthcare provider  Call your healthcare provider if you have any of the following:    Fever of 100.4 F (38 C) or higher, or as directed by your provider    Chest pain or trouble breathing    Signs of infection at the catheter insertion site. These include increased redness or swelling (inflammation), warmth, increasing pain, bleeding, or bad-smelling discharge.    Severe numbness or tingling in the treated leg    Severe pain or swelling in the treated leg       Follow-up  You'll have a follow-up visit with your healthcare provider within a week. An ultrasound will be done to check for problems, such as blood clots. Your provider will discuss further treatments with you, if needed.  Risks and possible complications   These include the following:    Bleeding    Infection    Blood clots    Damage to the nerves in the treated area    Irritation or burning of the skin over the treated vein    Treatment doesn't improve the look or the symptoms of the problem veins    Risks of any medicines used during the treatment      Date Last Reviewed: 5/1/2016 2000-2017 [employer]. [address] [place] [address]. All rights reserved. This information is not intended as a substitute for professional medical care. Always follow your healthcare professional's instructions.    Self-Care for Spider and Varicose Veins  Your healthcare provider may suggest that you try self-care. Exercising and maintaining a healthy weight may keep problem veins from getting worse. Wearing elastic stockings and elevating your legs can help improve blood flow. Taking breaks when you sit or stand helps, too.         The top of the elastic stocking should be below the bend in your knee for a proper fit.      Exercising  Exercising is good for your veins because it improves blood flow. Walking,  cycling, or swimming are great exercises for vein health. But be sure to check with your healthcare provider before starting any exercise program. Also, keep these hints in mind:    When exercising, start out slowly and try to build up to 30 minutes on most days.    Elevate your legs above heart level after exercise to keep blood from pooling in veins.    Maintaining a healthy weight  Being overweight puts extra pressure on your veins. To maintain a healthy weight, try these tips:    Choose lean meats, fish, and skinless chicken.    Use low-fat dairy products.    Eat foods high in fiber, such as whole grains, fruits, and vegetables.    Cut down on sugar, salt, and saturated and trans fats.    Exercise regularly.    Wearing elastic stockings  Elastic stockings gently squeeze veins so blood flows upward. If you need elastic stockings, your healthcare provider can prescribe them for you. Follow your healthcare provider's advice about how and when to wear them. Elastic stockings come in several different levels of pressure. Ask your healthcare provider which level of pressure would benefit you the most.     Elevating your legs  Raising your legs above heart level will help relieve swelling and keep blood from pooling in veins. Try to elevate your legs for 15 to 20 minutes at the end of the day, and whenever you're relaxing. To make sure your legs are raised above heart level, prop them up on cushions or large pillows.    When sitting and standing  To keep blood moving when you have to sit or stand for long periods, try these tips:    At work, take walking breaks instead of coffee breaks. Walk during your lunch hour. Or try flexing your feet up and down 10 times each hour.    When standing, raise yourself up and down on your toes, or rock back and forth on your heels.    Date Last Reviewed: 5/1/2016 2000-2017 [employer] [address], [place] [address]. All rights reserved. This information is  not intended as a substitute for professional medical care. Always follow your healthcare professional's instructions.    [facility]  [address]  [place] [address]    [place] [phone]  [phone]  [place] [phone]  [email or web address]    [facility]   [address]             [address]                 [address]     [place] [address]      [place] [address]         [place] [address]  [phone]  [phone]            [phone]                Fax: [phone]  www.[email or web address]  [address] [place] [address]  [phone]  [phone]  [email or web address]    Shyanne Blum  [phone]  [email or web address]    [facility]   [phone]    [facility]  [address] [place] [address]  [phone]

## 2021-06-21 NOTE — LETTER
Letter by Rogers Bowman MD at      Author: Rogers Bowman MD Service: -- Author Type: --    Filed:  Encounter Date: 3/16/2021 Status: (Other)         Wes Lyon MD  [address] ,  [address]  [facility] [address]                                  March 16, 2021    Patient: Cristiane Birmingham   MR Number: [id number]   YOB: 1940   Date of Visit: 3/16/2021     Dear Dr. Camron MD:    Thank you for referring Cristiane Birmingham to me for evaluation. Below are the relevant portions of my assessment and plan of care.    If you have questions, please do not hesitate to call me. I look forward to following Cristiane along with you.    Sincerely,        Rogers Bowman MD          CC  No Recipients  Rogers Bowman MD  3/16/2021 11:21 AM  Sign when Signing Visit  [facility] Surgery Follow up    HPI:    80 y.o. year old female who returns for a follow up.  She had attempted closure on that right greater saphenous vein but were unable to successfully do it secondary to inability to pass a wire or catheter through the greater saphenous vein stenotic areas.  She has a lot of dilated saphenofemoral junction and is here for follow-up ultrasound to see if this is dilated up since her last attempt.  She is wearing compression socks wears knee-high's on a regular basis.    Allergies:Fosamax [alendronate], Metronidazole, and Sulfa (sulfonamide antibiotics)    Past Medical History:   Diagnosis Date   ? Benign essential hypertension 11/4/2020   ? Breast cyst 2014   ? Colon polyp    ? Degenerative arthritis 11/4/2020   ? Diverticulitis of colon 11/4/2020   ? Dyspnea on exertion 2/6/2020   ? Gastroesophageal reflux disease 11/4/2020   ? Hyperlipidemia 11/4/2020   ? Hypothyroidism 11/4/2020   ? Inverted nipple     hx of inverted nipples   ? Palpitations 2/6/2020   ? PVC's (premature ventricular contractions) 2/6/2020   ? Senile incipient cataract 11/4/2020   ? Varicose veins of both lower extremities  2020       Past Surgical History:   Procedure Laterality Date   ? BREAST BIOPSY Left 2009   ? BREAST BIOPSY Right 2014    benign   ? BREAST CYST ASPIRATION Right    ? TUBAL LIGATION     ? VARICOSE VEIN SURGERY         CURRENT MEDS:  Current Outpatient Medications on File Prior to Visit   Medication Sig Dispense Refill   ? ascorbic acid, vitamin C, (VITAMIN C) 1000 MG tablet Take 1,000 mg by mouth daily.     ? ascorbic acid, vitamin C, (VITAMIN C) 1000 MG tablet 1 tab(s) orally once a day     ? aspirin 81 MG EC tablet 1     ? aspirin-calcium carbonate 81 mg-300 mg calcium(777 mg) Tab Take 81 mg by mouth daily.     ? atorvastatin (LIPITOR) 10 MG tablet Take 5 mg by mouth at bedtime.     ? calcium carbonate (CALCIUM 600 ORAL) Take 600 mg by mouth daily.     ? levothyroxine (SYNTHROID, LEVOTHROID) 100 MCG tablet Take 100 mcg by mouth 4 (four) times a week.     ? magnesium 200 mg Tab Take 200 mg by mouth daily.     ? magnesium 200 mg Tab 1 tab(s)     ? multivitamin with minerals (HAIR,SKIN AND NAILS ORAL) Take 1 tablet by mouth daily.     ? multivitamin-minerals-lutein (CENTRUM SILVER) tablet Take 1 tablet by mouth daily.     ? omega 3-dha-epa-fish oil (FISH OIL) 1,000 mg (120 mg-180 mg) cap Take 1 capsule by mouth daily.     ? omeprazole (PRILOSEC) 20 MG capsule Take 20 mg by mouth 4 (four) times a week.       No current facility-administered medications on file prior to visit.        Family History   Problem Relation Age of Onset   ? Pancreatic cancer Mother 92   ? Colon cancer Sister 70   ? Colon cancer Maternal Grandfather    ? Prostate cancer Paternal Grandfather    ? Breast cancer Cousin         paternal half cousin, onset in 30s   ? Breast cancer Cousin         paternal half cousin, onset in 50s   ? Breast cancer Cousin 40        paternal half cousin   ? Skin cancer Brother    ? Brain cancer Maternal Uncle          at 39   ? Colon cancer Paternal Uncle    ? Stomach cancer Cousin 50        paternal  half cousin   ? Cancer Cousin         paternal half cousin, sinus cancer   ? Breast cancer Cousin         maternal first-cousin, onset 40s   ? Lung cancer Cousin         maternal first-cousin,  around 50   ? Lung cancer Cousin         maternal first-cousin   ? Lung cancer Cousin         materanl first-cousin   ? Lung cancer Cousin         maternal first-cousin,  at 39   ? Cancer Maternal Uncle         throat cancer   ? Cancer Maternal Uncle         type not specified   ? Skin cancer Brother    ? Colon cancer Other         paternal half uncle   ? Pancreatic cancer Other         paternal half uncle   ? Colon cancer Father         onset in 80s        reports that she has quit smoking. She has never used smokeless tobacco.    Review of Systems:  Negative except occasionally some pain but her pain is greatly improved since she wears socks on a regular basis.Otherwise twelve system of review is negative.      OBJECTIVE:  There were no vitals filed for this visit.  There is no height or weight on file to calculate BMI.    EXAM:  GENERAL: This is a well-developed 80 y.o. female who appears her stated age  HEAD: normocephalic  VASCULAR: Pulses intact, symmetrical upper and lower extremities.  No major varicosities in the right upper thigh.        LABS:  Lab Results   Component Value Date    WBC 6.3 2019    WBC 6.1 2015    HGB 14.6 2019    HCT 44.2 2019    MCV 95 2019     2019     INR/Prothrombin Time      No results found for: HGBA1C  Lab Results   Component Value Date    ALT 46 (H) 2019    AST 29 2019    ALKPHOS 83 2019    BILITOT 0.6 2019        Images: Directly reviewed the images as they are being done with the ultrasonographer she has a dilated refluxing greater saphenous junction this gets prestenotic the about 4 to 5 cm distal to the junction and then really does not reconstitute very well for a great length of time most onto the  knee.    Assessment/Plan:   Status post attempted closure right greater saphenous vein unsuccessful secondary to stenosis the stenosis still maintains and is there today I do not think that she is a candidate for any kind of closure.  I think differently trying to cure this junction we do so with high ligation of her greater saphenous vein done under local MAC anesthesia in same-day surgery setting.  She has no large varicosities or branches office at this this time I do think we could definitely watch this which is what she would like to do.  We will do so and if this starts to have changes of varicosities or branches in the thigh especially where she can visualize those issues I would like her to return.    Return if symptoms worsen or fail to improve.     Rogers Bowman MD  [facility] Department of Surgery

## 2021-06-28 NOTE — PROGRESS NOTES
Progress Notes by Harsh Díaz MD at 2/6/2020  1:30 PM     Author: Harsh Díaz MD Service: -- Author Type: Physician    Filed: 2/6/2020  2:10 PM Encounter Date: 2/6/2020 Status: Signed    : Harsh Díaz MD (Physician)               Assessment/Recommendations   Patient with a long history of palpitations and heart pounding which had been improved for some time but now is more prominent.  She has had documentation of PVCs in the past by monitoring.  This feels similar if not the same.  She has had no syncopal or near syncopal episodes and they primarily occur with rest and really she does not notice it with activity.  She also has some shortness of breath on exertion which is new and I would like to repeat a stress echocardiogram to evaluate both of these issues.  I am hopeful that the stress echocardiogram will be unremarkable and we can encourage her to do more aerobic exercise such as 30 minutes of brisk walking at least 5 times a week.    We talked about the possibility of a beta-blocker if the symptoms are bothering her greatly but I think that she would be quite relieved if we can reassure her that there is no worrisome findings on her cardiac testing.    She has multiple normal TSH values in the past.  She does eat chocolate every day and she could cut back a little bit for reduction in caffeine to see if that would improve her symptoms and I have discussed that with her as well.    Thank you for allowing us to participate in her care.       History of Present Illness/Subjective    Ms. Cristiane Birmingham is a 79 y.o. female with a history of premature ventricular contractions as well as monitoring in the past which did show PVCs.  She had a stress echocardiogram 10 years ago which showed normal echocardiographic response to exercise.  She reports that in November of this year her symptoms of palpitations seem to be worse.  She would notice some pounding of her heart, sometimes when it was  not fast and other times it felt like she was having skipped beats.  She has a Fitbit and she can see sometimes that it skipping and she palpates it and feels like it will skip every third and then every fourth and then will get better.  She does not use much alcohol, she has had multiple normal thyroid stimulating hormone test in the past.  She does not drink coffee, once in a while tea but does have chocolate every night.  She does not take over-the-counter stimulants.  She does not get chest discomfort.  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    She denies a history of diabetes and also denies a history of tobacco abuse, hypertension, but has had some elevation in her cholesterol and does take a statin.  She does not have a family history of premature coronary artery disease.  Her father  at age 95.             Physical Examination Review of Systems   Vitals:    20 1328   BP: 110/66   Pulse: 76   Resp: 16     Body mass index is 26.43 kg/m .  Wt Readings from Last 3 Encounters:   20 154 lb (69.9 kg)   10/12/19 149 lb (67.6 kg)     General Appearance:   Alert, cooperative and in no acute distress.   ENT/Mouth: Oral mucuos membranes pink and moist .      EYES:  no scleral icterus, normal conjunctivae   Neck: JVP normal. No Hepatojugular reflux. Thyroid not visualized.   Chest/Lungs:   Lungs are clear to auscultation, equal chest wall expansion.   Cardiovascular:   S1, S2 without murmur ,clicks or rubs. Brachial, radial and posterior tibial pulses are intact and symetric. No carotid bruits noted   Abdomen:  Nontender. BS+.    Extremities: No cyanosis, clubbing or edema   Skin: no xanthelasma, warm.    Neurologic: normal  bilateral, no tremors.  Normal gait     Psychiatric: Appropriate affect.      General: Weight Gain  Eyes: WNL  Ears/Nose/Throat: Hearing Loss  Lungs: Cough, Shortness of  Breath  Heart: Shortness of Breath with activity, Irregular Heartbeat  Stomach: WNL  Bladder: WNL  Muscle/Joints: WNL  Skin: WNL  Nervous System: WNL  Mental Health: WNL     Blood: Easy Bruising       Medical History  Surgical History Family History Social History   Past Medical History:   Diagnosis Date   ? Breast cyst 2014   ? Colon polyp    ? Inverted nipple     hx of inverted nipples    Past Surgical History:   Procedure Laterality Date   ? BREAST BIOPSY Left 2009   ? BREAST BIOPSY Right 2014    benign   ? BREAST CYST ASPIRATION Right 2014   ? TUBAL LIGATION      Family History   Problem Relation Age of Onset   ? Pancreatic cancer Mother 92   ? Colon cancer Sister 70   ? Colon cancer Maternal Grandfather    ? Prostate cancer Paternal Grandfather    ? Breast cancer Cousin         paternal half cousin, onset in 30s   ? Breast cancer Cousin         paternal half cousin, onset in 50s   ? Breast cancer Cousin 40        paternal half cousin   ? Skin cancer Brother    ? Brain cancer Maternal Uncle          at 39   ? Colon cancer Paternal Uncle    ? Stomach cancer Cousin 50        paternal half cousin   ? Cancer Cousin         paternal half cousin, sinus cancer   ? Breast cancer Cousin         maternal first-cousin, onset 40s   ? Lung cancer Cousin         maternal first-cousin,  around 50   ? Lung cancer Cousin         maternal first-cousin   ? Lung cancer Cousin         materanl first-cousin   ? Lung cancer Cousin         maternal first-cousin,  at 39   ? Cancer Maternal Uncle         throat cancer   ? Cancer Maternal Uncle         type not specified   ? Skin cancer Brother    ? Colon cancer Other         paternal half uncle   ? Pancreatic cancer Other         paternal half uncle   ? Colon cancer Father         onset in 80s    Social History     Socioeconomic History   ? Marital status:      Spouse name: Not on file   ? Number of children: Not on file   ? Years of education: Not on file   ? Highest  education level: Not on file   Occupational History   ? Not on file   Social Needs   ? Financial resource strain: Not on file   ? Food insecurity:     Worry: Not on file     Inability: Not on file   ? Transportation needs:     Medical: Not on file     Non-medical: Not on file   Tobacco Use   ? Smoking status: Former Smoker   ? Smokeless tobacco: Never Used   ? Tobacco comment: briefly while in college   Substance and Sexual Activity   ? Alcohol use: Not on file   ? Drug use: Not on file   ? Sexual activity: Not on file   Lifestyle   ? Physical activity:     Days per week: Not on file     Minutes per session: Not on file   ? Stress: Not on file   Relationships   ? Social connections:     Talks on phone: Not on file     Gets together: Not on file     Attends Anabaptist service: Not on file     Active member of club or organization: Not on file     Attends meetings of clubs or organizations: Not on file     Relationship status: Not on file   ? Intimate partner violence:     Fear of current or ex partner: Not on file     Emotionally abused: Not on file     Physically abused: Not on file     Forced sexual activity: Not on file   Other Topics Concern   ? Not on file   Social History Narrative   ? Not on file          Medications  Allergies   Current Outpatient Medications   Medication Sig Dispense Refill   ? ascorbic acid, vitamin C, (VITAMIN C) 1000 MG tablet Take 1,000 mg by mouth daily.     ? aspirin-calcium carbonate 81 mg-300 mg calcium(777 mg) Tab Take 81 mg by mouth daily.     ? atorvastatin (LIPITOR) 10 MG tablet Take 5 mg by mouth at bedtime.     ? calcium carbonate (CALCIUM 600 ORAL) Take 600 mg by mouth daily.     ? levothyroxine (SYNTHROID, LEVOTHROID) 100 MCG tablet Take 100 mcg by mouth 4 (four) times a week.     ? magnesium 200 mg Tab Take 200 mg by mouth daily.     ? multivitamin with minerals (HAIR,SKIN AND NAILS ORAL) Take 1 tablet by mouth daily.     ? multivitamin-minerals-lutein (CENTRUM SILVER) tablet  Take 1 tablet by mouth daily.     ? omega 3-dha-epa-fish oil (FISH OIL) 1,000 mg (120 mg-180 mg) cap Take 1 capsule by mouth daily.     ? omeprazole (PRILOSEC) 20 MG capsule Take 20 mg by mouth 4 (four) times a week.       No current facility-administered medications for this visit.     Allergies   Allergen Reactions   ? Fosamax [Alendronate]    ? Sulfa (Sulfonamide Antibiotics)          Lab Results    Chemistry/lipid CBC Cardiac Enzymes/BNP/TSH/INR   Lab Results   Component Value Date    CHOL 171 03/20/2019    HDL 45 (L) 03/20/2019    LDLCALC 95 03/20/2019    TRIG 156 (H) 03/20/2019    CREATININE 0.76 03/20/2019    BUN 16 03/20/2019    K 4.2 03/20/2019     03/20/2019     03/20/2019    CO2 27 03/20/2019    Lab Results   Component Value Date    WBC 6.3 03/20/2019    HGB 14.6 03/20/2019    HCT 44.2 03/20/2019    MCV 95 03/20/2019     03/20/2019    Lab Results   Component Value Date    TSH 1.99 03/20/2019

## 2021-06-29 NOTE — PROGRESS NOTES
Progress Notes by Rogers Bowman MD at 11/4/2020  8:00 AM     Author: Rogers Bowman MD Service: -- Author Type: Physician    Filed: 11/9/2020  8:35 AM Encounter Date: 11/4/2020 Status: Signed    : Rogers Bowman MD (Physician)           [facility] Vein Consult      Assessment:     1. varicose veins, bilateral a few  2. spider veins, bilateral   3. Insuffiencey of right greater saphenous vein    Plan:     1. Treatment options of conservative therapy of stockings use, exercise, weight loss, elevating legs when possible.    2. Script for compression stockings 20-30 mm hg  3. Ultrasound to evaluate legs for incompetency of both deep and superficial system .   4. Surgical treatment   Endovenous closure ofright, greater saphenous vein   Laser treatment of bilateral  spider veins   Sclerotherapy of bilateral legs     Risks and benefits of surgical intervention including infection, burns, dvt, thrombophlebitis, not closing, recurrence, numbness and nerve injury and need for further intervention were all discused    5. Follow up: for procedure if approved.   6. Call for any questions concerns or issues    Subjective:      Cristiane Birmingham is a 80 y.o. female  who was referred by Wes Lyon MD  for evaluation of varicose veins. Symptoms include pain, aching, fatigue, burning, edema and dermatitis. Patient has history of leg selling, pain and vein issues that have progressed. Pain and symptoms have affected daily living and work activities needing medications. Here for evaluation today. Stocking use with compression stockings of 20-30 mm hg or greater for greater then 3 months    Allergies:Fosamax [alendronate], Metronidazole, and Sulfa (sulfonamide antibiotics)    Past Medical History:   Diagnosis Date   ? Benign essential hypertension 11/4/2020   ? Breast cyst 2014   ? Colon polyp    ? Degenerative arthritis 11/4/2020   ? Diverticulitis of colon 11/4/2020   ? Dyspnea on exertion 2/6/2020    ? Gastroesophageal reflux disease 11/4/2020   ? Hyperlipidemia 11/4/2020   ? Hypothyroidism 11/4/2020   ? Inverted nipple     hx of inverted nipples   ? Palpitations 2/6/2020   ? PVC's (premature ventricular contractions) 2/6/2020   ? Senile incipient cataract 11/4/2020   ? Varicose veins of both lower extremities 11/4/2020       Past Surgical History:   Procedure Laterality Date   ? BREAST BIOPSY Left 2009   ? BREAST BIOPSY Right 2014    benign   ? BREAST CYST ASPIRATION Right 2014   ? TUBAL LIGATION     ? VARICOSE VEIN SURGERY         Current Outpatient Medications   Medication Sig   ? ascorbic acid, vitamin C, (VITAMIN C) 1000 MG tablet Take 1,000 mg by mouth daily.   ? ascorbic acid, vitamin C, (VITAMIN C) 1000 MG tablet 1 tab(s) orally once a day   ? aspirin 81 MG EC tablet 1   ? aspirin-calcium carbonate 81 mg-300 mg calcium(777 mg) Tab Take 81 mg by mouth daily.   ? atorvastatin (LIPITOR) 10 MG tablet Take 5 mg by mouth at bedtime.   ? calcium carbonate (CALCIUM 600 ORAL) Take 600 mg by mouth daily.   ? levothyroxine (SYNTHROID, LEVOTHROID) 100 MCG tablet Take 100 mcg by mouth 4 (four) times a week.   ? magnesium 200 mg Tab Take 200 mg by mouth daily.   ? magnesium 200 mg Tab 1 tab(s)   ? multivitamin with minerals (HAIR,SKIN AND NAILS ORAL) Take 1 tablet by mouth daily.   ? multivitamin-minerals-lutein (CENTRUM SILVER) tablet Take 1 tablet by mouth daily.   ? omega 3-dha-epa-fish oil (FISH OIL) 1,000 mg (120 mg-180 mg) cap Take 1 capsule by mouth daily.   ? omeprazole (PRILOSEC) 20 MG capsule Take 20 mg by mouth 4 (four) times a week.       Family History   Problem Relation Age of Onset   ? Pancreatic cancer Mother 92   ? Colon cancer Sister 70   ? Colon cancer Maternal Grandfather    ? Prostate cancer Paternal Grandfather    ? Breast cancer Cousin         paternal half cousin, onset in 30s   ? Breast cancer Cousin         paternal half cousin, onset in 50s   ? Breast cancer Cousin 40        paternal half  "cousin   ? Skin cancer Brother    ? Brain cancer Maternal Uncle          at 39   ? Colon cancer Paternal Uncle    ? Stomach cancer Cousin 50        paternal half cousin   ? Cancer Cousin         paternal half cousin, sinus cancer   ? Breast cancer Cousin         maternal first-cousin, onset 40s   ? Lung cancer Cousin         maternal first-cousin,  around 50   ? Lung cancer Cousin         maternal first-cousin   ? Lung cancer Cousin         materanl first-cousin   ? Lung cancer Cousin         maternal first-cousin,  at 39   ? Cancer Maternal Uncle         throat cancer   ? Cancer Maternal Uncle         type not specified   ? Skin cancer Brother    ? Colon cancer Other         paternal half uncle   ? Pancreatic cancer Other         paternal half uncle   ? Colon cancer Father         onset in 80s        reports that she has quit smoking. She has never used smokeless tobacco.      Review of Systems:  Pertinent items are noted in HPI.  A 12 point comprehensive review of systems was negative except as noted.   Patient has symptomatic veins and changes of right legs. These have progressed to the point of causing symptoms on a daily basis. This causes issues with daily activities and chores such as washing dishes, vacuuming, outdoor upkeep and standing for long lengths of time       Objective:     Vitals:    20 0801   BP: 130/60   Pulse: 72   Resp: 12   Temp: 97.9  F (36.6  C)   Weight: 150 lb 4.8 oz (68.2 kg)   Height: 5' 5\" (1.651 m)     Body mass index is 25.01 kg/m .    EXAM:  GENERAL: This is a well-developed 80 y.o. female who appears her stated age  HEAD: normocephalic  HEENT: Pupils equal and reactive bilaterally  MOUTH: mucus membranes intact. Normal dentation  CARDIAC: RRR without murmur  CHEST/LUNG:  Clear to auscultation bilaterally  ABDOMEN: Soft, nontender, nondistended, no masses noted   NEUROLOGIC: Focally intact, nonfocal, alert and oriented x 3  INTEGUMENT: No open lesions or " ulcers  VASCULAR: Pulses intact, symmetrical upper and lower extremities. There areskin changes consistent with chronic venous insufficiency. Varicose veins present in bilateral greater saphenous distribution. Spider veins present bilateral.                    Imaging:    US Venous Insufficiency Leg Right (Order [id number])  Imaging  Date: 11/4/2020 Department: [facility] [place] Released By: Sebastián Jaeger RDMS, RVT Authorizing: Rogers Bowman MD   Study Result    RIGHT Venous Insufficiency Ultrasound     RIGHT Lower Extremity             Indication:  Symptomatic varicose veins/pain/swelling     Previous: 2012     Patient History: Swelling, Stasis and Vein Ablation     Presenting Symptoms:  Swelling, Varicose Veins, Pain and Stasis     Technique:   Supine and Upright Ultrasound of the Deep and Superficial Veins with Valsalva and Compression Augmentation Maneuvers. Duplex Imaging is performed utilizing gray-scale, Two-dimensional images, color-flow imaging, Doppler waveform analysis, and Spectral doppler imaging done with provacative maneuvers.      Incompetency Criteria:  Deep vein reflux reported when greater than 1000 msec flow reversal. Superficial vein reflux reported when equal to or greater than 600 msec flow reversal.  vein reflux reported as greater than 350 msec flow reversal.     RIGHT Leg  Deep System      Location CFV SFJ PFV PROX SFV PROX SFV MID SFV DIST POP V. PERON. V. PTV'S   Compressibility  (FC,PC,NC) FC FC FC FC FC FC FC FC FC   Spontaneous +   + + + + +   +   Phasic  +   + + + + +   +   Augmentation +   + + + + +   +   Reflux +   + - - - -   -      RIGHT  Leg  Superficial System       Location SFJ PROX THIGH KNEE MID CALF SPJ PROX CALF MID CALF   GSV (mm) 10 4 4 4         Reflux + + + +         SSV (mm)         3 3 4   Reflux         - - -      Compression Study:  Normal     Impression:      RIGHT Deep Vein Findings: Significant reflux noted in the  common femoral and profunda vein segments     RIGHT Superficial Vein Findings:      Greater saphenous vein: Patent greater saphenous vein with significant reflux from the saphenofemoral junction all the way to the mid calf with maximum vein diameter of 10 mm     Small Saphenous Vein: Patent small saphenous vein without reflux     Perforating and Accessory Veins: No reflux noted     Reference:     Compressibility: FC= Fully compressible, PC= Partially compressible, NC= Non-compressible, NV= Not Visualized     Venous Doppler: (+) = Present  (0) = Absent  (-) = Decreased/Unable to Evaluate, (NV) = Not Visualized     Reflux: (+) Incompetent  (-) Competent, (NV) = Not Visualized     Interpretation criteria:      Duration of Retrograde flow (milliseconds)  Category Deep Veins Superficial Veins  veins   Competent < 1000ms < 600ms < 350ms   Incompetent > 1000ms > 600ms > 350ms            Rogers Bowman MD  General Surgery [phone]  Vascular Surgery [phone]

## 2021-07-13 ENCOUNTER — RECORDS - HEALTHEAST (OUTPATIENT)
Dept: ADMINISTRATIVE | Facility: CLINIC | Age: 81
End: 2021-07-13

## 2021-07-21 ENCOUNTER — RECORDS - HEALTHEAST (OUTPATIENT)
Dept: ADMINISTRATIVE | Facility: CLINIC | Age: 81
End: 2021-07-21

## 2021-09-11 ENCOUNTER — HEALTH MAINTENANCE LETTER (OUTPATIENT)
Age: 81
End: 2021-09-11

## 2021-11-06 ENCOUNTER — HEALTH MAINTENANCE LETTER (OUTPATIENT)
Age: 81
End: 2021-11-06

## 2022-06-21 ENCOUNTER — LAB REQUISITION (OUTPATIENT)
Dept: LAB | Facility: CLINIC | Age: 82
End: 2022-06-21

## 2022-06-21 DIAGNOSIS — E03.9 HYPOTHYROIDISM, UNSPECIFIED: ICD-10-CM

## 2022-06-21 LAB — TSH SERPL DL<=0.005 MIU/L-ACNC: 1.48 UIU/ML (ref 0.3–5)

## 2022-06-21 PROCEDURE — 84443 ASSAY THYROID STIM HORMONE: CPT | Performed by: STUDENT IN AN ORGANIZED HEALTH CARE EDUCATION/TRAINING PROGRAM

## 2022-10-12 ENCOUNTER — LAB REQUISITION (OUTPATIENT)
Dept: LAB | Facility: CLINIC | Age: 82
End: 2022-10-12

## 2022-10-12 DIAGNOSIS — E78.5 HYPERLIPIDEMIA, UNSPECIFIED: ICD-10-CM

## 2022-10-12 DIAGNOSIS — I10 ESSENTIAL (PRIMARY) HYPERTENSION: ICD-10-CM

## 2022-10-12 DIAGNOSIS — E03.9 HYPOTHYROIDISM, UNSPECIFIED: ICD-10-CM

## 2022-10-12 LAB
ALBUMIN SERPL BCG-MCNC: 4 G/DL (ref 3.5–5.2)
ALP SERPL-CCNC: 92 U/L (ref 35–104)
ALT SERPL W P-5'-P-CCNC: 19 U/L (ref 10–35)
ANION GAP SERPL CALCULATED.3IONS-SCNC: 13 MMOL/L (ref 7–15)
AST SERPL W P-5'-P-CCNC: 14 U/L (ref 10–35)
BILIRUB SERPL-MCNC: 0.3 MG/DL
BUN SERPL-MCNC: 24.2 MG/DL (ref 8–23)
CALCIUM SERPL-MCNC: 9.1 MG/DL (ref 8.8–10.2)
CHLORIDE SERPL-SCNC: 103 MMOL/L (ref 98–107)
CHOLEST SERPL-MCNC: 152 MG/DL
CREAT SERPL-MCNC: 0.73 MG/DL (ref 0.51–0.95)
DEPRECATED HCO3 PLAS-SCNC: 24 MMOL/L (ref 22–29)
GFR SERPL CREATININE-BSD FRML MDRD: 83 ML/MIN/1.73M2
GLUCOSE SERPL-MCNC: 125 MG/DL (ref 70–99)
HDLC SERPL-MCNC: 53 MG/DL
LDLC SERPL CALC-MCNC: 71 MG/DL
NONHDLC SERPL-MCNC: 99 MG/DL
POTASSIUM SERPL-SCNC: 4.3 MMOL/L (ref 3.4–5.3)
PROT SERPL-MCNC: 6.1 G/DL (ref 6.4–8.3)
SODIUM SERPL-SCNC: 140 MMOL/L (ref 136–145)
TRIGL SERPL-MCNC: 138 MG/DL
TSH SERPL DL<=0.005 MIU/L-ACNC: 1.72 UIU/ML (ref 0.3–4.2)

## 2022-10-12 PROCEDURE — 84443 ASSAY THYROID STIM HORMONE: CPT | Performed by: FAMILY MEDICINE

## 2022-10-12 PROCEDURE — 80053 COMPREHEN METABOLIC PANEL: CPT | Performed by: FAMILY MEDICINE

## 2022-10-12 PROCEDURE — 80061 LIPID PANEL: CPT | Performed by: FAMILY MEDICINE

## 2022-10-30 ENCOUNTER — HEALTH MAINTENANCE LETTER (OUTPATIENT)
Age: 82
End: 2022-10-30

## 2022-12-17 ENCOUNTER — HEALTH MAINTENANCE LETTER (OUTPATIENT)
Age: 82
End: 2022-12-17

## 2023-03-11 ENCOUNTER — APPOINTMENT (OUTPATIENT)
Dept: CT IMAGING | Facility: HOSPITAL | Age: 83
DRG: 522 | End: 2023-03-11
Attending: EMERGENCY MEDICINE
Payer: COMMERCIAL

## 2023-03-11 ENCOUNTER — HOSPITAL ENCOUNTER (INPATIENT)
Facility: HOSPITAL | Age: 83
LOS: 3 days | Discharge: NURSING, REHAB OR LONG-TERM CARE | DRG: 522 | End: 2023-03-14
Attending: EMERGENCY MEDICINE | Admitting: INTERNAL MEDICINE
Payer: COMMERCIAL

## 2023-03-11 ENCOUNTER — APPOINTMENT (OUTPATIENT)
Dept: RADIOLOGY | Facility: HOSPITAL | Age: 83
DRG: 522 | End: 2023-03-11
Attending: EMERGENCY MEDICINE
Payer: COMMERCIAL

## 2023-03-11 ENCOUNTER — ANESTHESIA EVENT (OUTPATIENT)
Dept: SURGERY | Facility: HOSPITAL | Age: 83
DRG: 522 | End: 2023-03-11
Payer: COMMERCIAL

## 2023-03-11 ENCOUNTER — ANESTHESIA (OUTPATIENT)
Dept: SURGERY | Facility: HOSPITAL | Age: 83
DRG: 522 | End: 2023-03-11
Payer: COMMERCIAL

## 2023-03-11 DIAGNOSIS — S72.142A CLOSED INTERTROCHANTERIC FRACTURE OF HIP, LEFT, INITIAL ENCOUNTER (H): ICD-10-CM
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
## 2023-03-11 DIAGNOSIS — S72.002A CLOSED FRACTURE OF NECK OF LEFT FEMUR, INITIAL ENCOUNTER (H): Primary | ICD-10-CM

## 2023-03-11 DIAGNOSIS — Z96.649 STATUS POST HIP HEMIARTHROPLASTY: ICD-10-CM

## 2023-03-11 LAB
ANION GAP SERPL CALCULATED.3IONS-SCNC: 12 MMOL/L (ref 7–15)
BASOPHILS # BLD AUTO: 0 10E3/UL (ref 0–0.2)
BASOPHILS NFR BLD AUTO: 0 %
BUN SERPL-MCNC: 13.1 MG/DL (ref 8–23)
CALCIUM SERPL-MCNC: 9.2 MG/DL (ref 8.8–10.2)
CHLORIDE SERPL-SCNC: 104 MMOL/L (ref 98–107)
CREAT SERPL-MCNC: 0.57 MG/DL (ref 0.51–0.95)
DEPRECATED HCO3 PLAS-SCNC: 23 MMOL/L (ref 22–29)
EOSINOPHIL # BLD AUTO: 0 10E3/UL (ref 0–0.7)
EOSINOPHIL NFR BLD AUTO: 0 %
ERYTHROCYTE [DISTWIDTH] IN BLOOD BY AUTOMATED COUNT: 12.9 % (ref 10–15)
GFR SERPL CREATININE-BSD FRML MDRD: >90 ML/MIN/1.73M2
GLUCOSE SERPL-MCNC: 142 MG/DL (ref 70–99)
HCT VFR BLD AUTO: 43.6 % (ref 35–47)
HGB BLD-MCNC: 14.9 G/DL (ref 11.7–15.7)
IMM GRANULOCYTES # BLD: 0.1 10E3/UL
IMM GRANULOCYTES NFR BLD: 1 %
INR PPP: 0.94 (ref 0.85–1.15)
LYMPHOCYTES # BLD AUTO: 1.5 10E3/UL (ref 0.8–5.3)
LYMPHOCYTES NFR BLD AUTO: 11 %
MCH RBC QN AUTO: 31.7 PG (ref 26.5–33)
MCHC RBC AUTO-ENTMCNC: 34.2 G/DL (ref 31.5–36.5)
MCV RBC AUTO: 93 FL (ref 78–100)
MONOCYTES # BLD AUTO: 0.8 10E3/UL (ref 0–1.3)
MONOCYTES NFR BLD AUTO: 6 %
NEUTROPHILS # BLD AUTO: 11.1 10E3/UL (ref 1.6–8.3)
NEUTROPHILS NFR BLD AUTO: 82 %
NRBC # BLD AUTO: 0 10E3/UL
NRBC BLD AUTO-RTO: 0 /100
PLATELET # BLD AUTO: 235 10E3/UL (ref 150–450)
POTASSIUM SERPL-SCNC: 3.7 MMOL/L (ref 3.4–5.3)
RBC # BLD AUTO: 4.7 10E6/UL (ref 3.8–5.2)
SODIUM SERPL-SCNC: 139 MMOL/L (ref 136–145)
WBC # BLD AUTO: 13.4 10E3/UL (ref 4–11)

## 2023-03-11 PROCEDURE — 72170 X-RAY EXAM OF PELVIS: CPT

## 2023-03-11 PROCEDURE — 360N000077 HC SURGERY LEVEL 4, PER MIN: Performed by: STUDENT IN AN ORGANIZED HEALTH CARE EDUCATION/TRAINING PROGRAM

## 2023-03-11 PROCEDURE — C1776 JOINT DEVICE (IMPLANTABLE): HCPCS | Performed by: STUDENT IN AN ORGANIZED HEALTH CARE EDUCATION/TRAINING PROGRAM

## 2023-03-11 PROCEDURE — 80048 BASIC METABOLIC PNL TOTAL CA: CPT | Performed by: EMERGENCY MEDICINE

## 2023-03-11 PROCEDURE — 250N000011 HC RX IP 250 OP 636

## 2023-03-11 PROCEDURE — 370N000017 HC ANESTHESIA TECHNICAL FEE, PER MIN: Performed by: STUDENT IN AN ORGANIZED HEALTH CARE EDUCATION/TRAINING PROGRAM

## 2023-03-11 PROCEDURE — 250N000013 HC RX MED GY IP 250 OP 250 PS 637: Performed by: INTERNAL MEDICINE

## 2023-03-11 PROCEDURE — 710N000009 HC RECOVERY PHASE 1, LEVEL 1, PER MIN: Performed by: STUDENT IN AN ORGANIZED HEALTH CARE EDUCATION/TRAINING PROGRAM

## 2023-03-11 PROCEDURE — 250N000013 HC RX MED GY IP 250 OP 250 PS 637: Performed by: ANESTHESIOLOGY

## 2023-03-11 PROCEDURE — 999N000141 HC STATISTIC PRE-PROCEDURE NURSING ASSESSMENT: Performed by: STUDENT IN AN ORGANIZED HEALTH CARE EDUCATION/TRAINING PROGRAM

## 2023-03-11 PROCEDURE — 258N000003 HC RX IP 258 OP 636: Performed by: INTERNAL MEDICINE

## 2023-03-11 PROCEDURE — 96374 THER/PROPH/DIAG INJ IV PUSH: CPT

## 2023-03-11 PROCEDURE — 250N000009 HC RX 250: Performed by: STUDENT IN AN ORGANIZED HEALTH CARE EDUCATION/TRAINING PROGRAM

## 2023-03-11 PROCEDURE — 250N000011 HC RX IP 250 OP 636: Performed by: EMERGENCY MEDICINE

## 2023-03-11 PROCEDURE — 258N000003 HC RX IP 258 OP 636: Performed by: ANESTHESIOLOGY

## 2023-03-11 PROCEDURE — 250N000009 HC RX 250: Performed by: ANESTHESIOLOGY

## 2023-03-11 PROCEDURE — 99285 EMERGENCY DEPT VISIT HI MDM: CPT | Mod: 25

## 2023-03-11 PROCEDURE — 250N000013 HC RX MED GY IP 250 OP 250 PS 637

## 2023-03-11 PROCEDURE — 250N000011 HC RX IP 250 OP 636: Performed by: ANESTHESIOLOGY

## 2023-03-11 PROCEDURE — 72125 CT NECK SPINE W/O DYE: CPT

## 2023-03-11 PROCEDURE — 120N000001 HC R&B MED SURG/OB

## 2023-03-11 PROCEDURE — 70450 CT HEAD/BRAIN W/O DYE: CPT

## 2023-03-11 PROCEDURE — 36415 COLL VENOUS BLD VENIPUNCTURE: CPT | Performed by: EMERGENCY MEDICINE

## 2023-03-11 PROCEDURE — 0SRS019 REPLACEMENT OF LEFT HIP JOINT, FEMORAL SURFACE WITH METAL SYNTHETIC SUBSTITUTE, CEMENTED, OPEN APPROACH: ICD-10-PCS | Performed by: STUDENT IN AN ORGANIZED HEALTH CARE EDUCATION/TRAINING PROGRAM

## 2023-03-11 PROCEDURE — 85610 PROTHROMBIN TIME: CPT | Performed by: EMERGENCY MEDICINE

## 2023-03-11 PROCEDURE — 272N000001 HC OR GENERAL SUPPLY STERILE: Performed by: STUDENT IN AN ORGANIZED HEALTH CARE EDUCATION/TRAINING PROGRAM

## 2023-03-11 PROCEDURE — 99223 1ST HOSP IP/OBS HIGH 75: CPT | Performed by: INTERNAL MEDICINE

## 2023-03-11 PROCEDURE — 250N000011 HC RX IP 250 OP 636: Performed by: INTERNAL MEDICINE

## 2023-03-11 PROCEDURE — C1713 ANCHOR/SCREW BN/BN,TIS/BN: HCPCS | Performed by: STUDENT IN AN ORGANIZED HEALTH CARE EDUCATION/TRAINING PROGRAM

## 2023-03-11 PROCEDURE — 85004 AUTOMATED DIFF WBC COUNT: CPT | Performed by: EMERGENCY MEDICINE

## 2023-03-11 PROCEDURE — 250N000025 HC SEVOFLURANE, PER MIN: Performed by: STUDENT IN AN ORGANIZED HEALTH CARE EDUCATION/TRAINING PROGRAM

## 2023-03-11 RX ORDER — FENTANYL CITRATE 50 UG/ML
25 INJECTION, SOLUTION INTRAMUSCULAR; INTRAVENOUS EVERY 5 MIN PRN
Status: DISCONTINUED | OUTPATIENT
Start: 2023-03-11 | End: 2023-03-12

## 2023-03-11 RX ORDER — AMOXICILLIN 250 MG
1 CAPSULE ORAL 2 TIMES DAILY
Status: DISCONTINUED | OUTPATIENT
Start: 2023-03-11 | End: 2023-03-14 | Stop reason: HOSPADM

## 2023-03-11 RX ORDER — ACETAMINOPHEN 650 MG/1
650 SUPPOSITORY RECTAL EVERY 6 HOURS PRN
Status: DISCONTINUED | OUTPATIENT
Start: 2023-03-11 | End: 2023-03-14 | Stop reason: HOSPADM

## 2023-03-11 RX ORDER — FENTANYL CITRATE 50 UG/ML
INJECTION, SOLUTION INTRAMUSCULAR; INTRAVENOUS PRN
Status: DISCONTINUED | OUTPATIENT
Start: 2023-03-11 | End: 2023-03-12

## 2023-03-11 RX ORDER — OXYCODONE HYDROCHLORIDE 5 MG/1
10 TABLET ORAL EVERY 4 HOURS PRN
Status: CANCELLED | OUTPATIENT
Start: 2023-03-11

## 2023-03-11 RX ORDER — AMOXICILLIN 250 MG
2 CAPSULE ORAL 2 TIMES DAILY
Status: DISCONTINUED | OUTPATIENT
Start: 2023-03-11 | End: 2023-03-14 | Stop reason: HOSPADM

## 2023-03-11 RX ORDER — SODIUM CHLORIDE 9 MG/ML
INJECTION, SOLUTION INTRAVENOUS CONTINUOUS
Status: DISCONTINUED | OUTPATIENT
Start: 2023-03-11 | End: 2023-03-13

## 2023-03-11 RX ORDER — MEPERIDINE HYDROCHLORIDE 25 MG/ML
12.5 INJECTION INTRAMUSCULAR; INTRAVENOUS; SUBCUTANEOUS EVERY 5 MIN PRN
Status: DISCONTINUED | OUTPATIENT
Start: 2023-03-11 | End: 2023-03-12 | Stop reason: HOSPADM

## 2023-03-11 RX ORDER — HYDROMORPHONE HCL IN WATER/PF 6 MG/30 ML
0.2 PATIENT CONTROLLED ANALGESIA SYRINGE INTRAVENOUS EVERY 6 HOURS PRN
Status: DISCONTINUED | OUTPATIENT
Start: 2023-03-11 | End: 2023-03-13

## 2023-03-11 RX ORDER — LIDOCAINE 40 MG/G
CREAM TOPICAL
Status: DISCONTINUED | OUTPATIENT
Start: 2023-03-11 | End: 2023-03-12 | Stop reason: HOSPADM

## 2023-03-11 RX ORDER — ONDANSETRON 2 MG/ML
4 INJECTION INTRAMUSCULAR; INTRAVENOUS EVERY 30 MIN PRN
Status: DISCONTINUED | OUTPATIENT
Start: 2023-03-11 | End: 2023-03-12

## 2023-03-11 RX ORDER — LIDOCAINE 40 MG/G
CREAM TOPICAL
Status: DISCONTINUED | OUTPATIENT
Start: 2023-03-11 | End: 2023-03-13

## 2023-03-11 RX ORDER — HYDROMORPHONE HYDROCHLORIDE 1 MG/ML
0.5 INJECTION, SOLUTION INTRAMUSCULAR; INTRAVENOUS; SUBCUTANEOUS ONCE
Status: COMPLETED | OUTPATIENT
Start: 2023-03-11 | End: 2023-03-11

## 2023-03-11 RX ORDER — PROPOFOL 10 MG/ML
INJECTION, EMULSION INTRAVENOUS CONTINUOUS PRN
Status: DISCONTINUED | OUTPATIENT
Start: 2023-03-11 | End: 2023-03-12

## 2023-03-11 RX ORDER — HYDROMORPHONE HCL IN WATER/PF 6 MG/30 ML
0.2 PATIENT CONTROLLED ANALGESIA SYRINGE INTRAVENOUS
Status: CANCELLED | OUTPATIENT
Start: 2023-03-11

## 2023-03-11 RX ORDER — NALOXONE HYDROCHLORIDE 0.4 MG/ML
0.2 INJECTION, SOLUTION INTRAMUSCULAR; INTRAVENOUS; SUBCUTANEOUS
Status: DISCONTINUED | OUTPATIENT
Start: 2023-03-11 | End: 2023-03-14 | Stop reason: HOSPADM

## 2023-03-11 RX ORDER — HYDROMORPHONE HYDROCHLORIDE 1 MG/ML
0.4 INJECTION, SOLUTION INTRAMUSCULAR; INTRAVENOUS; SUBCUTANEOUS EVERY 5 MIN PRN
Status: DISCONTINUED | OUTPATIENT
Start: 2023-03-11 | End: 2023-03-12

## 2023-03-11 RX ORDER — SODIUM CHLORIDE, SODIUM LACTATE, POTASSIUM CHLORIDE, CALCIUM CHLORIDE 600; 310; 30; 20 MG/100ML; MG/100ML; MG/100ML; MG/100ML
INJECTION, SOLUTION INTRAVENOUS CONTINUOUS
Status: DISCONTINUED | OUTPATIENT
Start: 2023-03-11 | End: 2023-03-12

## 2023-03-11 RX ORDER — NALOXONE HYDROCHLORIDE 0.4 MG/ML
0.4 INJECTION, SOLUTION INTRAMUSCULAR; INTRAVENOUS; SUBCUTANEOUS
Status: DISCONTINUED | OUTPATIENT
Start: 2023-03-11 | End: 2023-03-14 | Stop reason: HOSPADM

## 2023-03-11 RX ORDER — ACETAMINOPHEN 325 MG/1
975 TABLET ORAL EVERY 8 HOURS
Status: CANCELLED | OUTPATIENT
Start: 2023-03-11 | End: 2023-03-14

## 2023-03-11 RX ORDER — HYDROMORPHONE HYDROCHLORIDE 1 MG/ML
0.2 INJECTION, SOLUTION INTRAMUSCULAR; INTRAVENOUS; SUBCUTANEOUS EVERY 5 MIN PRN
Status: DISCONTINUED | OUTPATIENT
Start: 2023-03-11 | End: 2023-03-12

## 2023-03-11 RX ORDER — PANTOPRAZOLE SODIUM 40 MG/1
40 TABLET, DELAYED RELEASE ORAL
Status: DISCONTINUED | OUTPATIENT
Start: 2023-03-12 | End: 2023-03-14 | Stop reason: HOSPADM

## 2023-03-11 RX ORDER — ACETAMINOPHEN 325 MG/1
650 TABLET ORAL EVERY 4 HOURS PRN
Status: CANCELLED | OUTPATIENT
Start: 2023-03-14

## 2023-03-11 RX ORDER — BISACODYL 10 MG
10 SUPPOSITORY, RECTAL RECTAL DAILY PRN
Status: DISCONTINUED | OUTPATIENT
Start: 2023-03-11 | End: 2023-03-14 | Stop reason: HOSPADM

## 2023-03-11 RX ORDER — AMOXICILLIN 250 MG
1 CAPSULE ORAL 2 TIMES DAILY
Status: CANCELLED | OUTPATIENT
Start: 2023-03-11

## 2023-03-11 RX ORDER — ACETAMINOPHEN 325 MG/1
650 TABLET ORAL EVERY 6 HOURS PRN
Status: DISCONTINUED | OUTPATIENT
Start: 2023-03-11 | End: 2023-03-14 | Stop reason: HOSPADM

## 2023-03-11 RX ORDER — FENTANYL CITRATE 50 UG/ML
50 INJECTION, SOLUTION INTRAMUSCULAR; INTRAVENOUS EVERY 5 MIN PRN
Status: DISCONTINUED | OUTPATIENT
Start: 2023-03-11 | End: 2023-03-12

## 2023-03-11 RX ORDER — PROPOFOL 10 MG/ML
INJECTION, EMULSION INTRAVENOUS PRN
Status: DISCONTINUED | OUTPATIENT
Start: 2023-03-11 | End: 2023-03-12

## 2023-03-11 RX ORDER — ONDANSETRON 2 MG/ML
4 INJECTION INTRAMUSCULAR; INTRAVENOUS EVERY 6 HOURS PRN
Status: DISCONTINUED | OUTPATIENT
Start: 2023-03-11 | End: 2023-03-13

## 2023-03-11 RX ORDER — OXYCODONE HYDROCHLORIDE 5 MG/1
5 TABLET ORAL EVERY 4 HOURS PRN
Status: DISCONTINUED | OUTPATIENT
Start: 2023-03-11 | End: 2023-03-13

## 2023-03-11 RX ORDER — ONDANSETRON 4 MG/1
4 TABLET, ORALLY DISINTEGRATING ORAL EVERY 6 HOURS PRN
Status: CANCELLED | OUTPATIENT
Start: 2023-03-11

## 2023-03-11 RX ORDER — ONDANSETRON 4 MG/1
4 TABLET, ORALLY DISINTEGRATING ORAL EVERY 6 HOURS PRN
Status: DISCONTINUED | OUTPATIENT
Start: 2023-03-11 | End: 2023-03-13

## 2023-03-11 RX ORDER — HYDRALAZINE HYDROCHLORIDE 20 MG/ML
10 INJECTION INTRAMUSCULAR; INTRAVENOUS EVERY 6 HOURS PRN
Status: DISCONTINUED | OUTPATIENT
Start: 2023-03-11 | End: 2023-03-14 | Stop reason: HOSPADM

## 2023-03-11 RX ORDER — TRANEXAMIC ACID 650 MG/1
1950 TABLET ORAL ONCE
Status: COMPLETED | OUTPATIENT
Start: 2023-03-11 | End: 2023-03-11

## 2023-03-11 RX ORDER — CEFAZOLIN SODIUM/WATER 2 G/20 ML
2 SYRINGE (ML) INTRAVENOUS
Status: COMPLETED | OUTPATIENT
Start: 2023-03-11 | End: 2023-03-11

## 2023-03-11 RX ORDER — PROCHLORPERAZINE MALEATE 5 MG
5 TABLET ORAL EVERY 6 HOURS PRN
Status: CANCELLED | OUTPATIENT
Start: 2023-03-11

## 2023-03-11 RX ORDER — ACETAMINOPHEN 325 MG/1
975 TABLET ORAL ONCE
Status: COMPLETED | OUTPATIENT
Start: 2023-03-11 | End: 2023-03-11

## 2023-03-11 RX ORDER — ENOXAPARIN SODIUM 100 MG/ML
40 INJECTION SUBCUTANEOUS EVERY 24 HOURS
Status: CANCELLED | OUTPATIENT
Start: 2023-03-12

## 2023-03-11 RX ORDER — LIDOCAINE 40 MG/G
CREAM TOPICAL
Status: CANCELLED | OUTPATIENT
Start: 2023-03-11

## 2023-03-11 RX ORDER — LIDOCAINE HYDROCHLORIDE 10 MG/ML
INJECTION, SOLUTION INFILTRATION; PERINEURAL PRN
Status: DISCONTINUED | OUTPATIENT
Start: 2023-03-11 | End: 2023-03-12

## 2023-03-11 RX ORDER — FENTANYL CITRATE 50 UG/ML
50 INJECTION, SOLUTION INTRAMUSCULAR; INTRAVENOUS ONCE
Status: COMPLETED | OUTPATIENT
Start: 2023-03-11 | End: 2023-03-11

## 2023-03-11 RX ORDER — ONDANSETRON 4 MG/1
4 TABLET, ORALLY DISINTEGRATING ORAL EVERY 30 MIN PRN
Status: DISCONTINUED | OUTPATIENT
Start: 2023-03-11 | End: 2023-03-12

## 2023-03-11 RX ORDER — OXYCODONE HYDROCHLORIDE 5 MG/1
5 TABLET ORAL EVERY 4 HOURS PRN
Status: CANCELLED | OUTPATIENT
Start: 2023-03-11

## 2023-03-11 RX ORDER — LEVOTHYROXINE SODIUM 100 UG/1
100 TABLET ORAL
Status: DISCONTINUED | OUTPATIENT
Start: 2023-03-12 | End: 2023-03-14 | Stop reason: HOSPADM

## 2023-03-11 RX ORDER — FENTANYL CITRATE 50 UG/ML
25-100 INJECTION, SOLUTION INTRAMUSCULAR; INTRAVENOUS
Status: DISCONTINUED | OUTPATIENT
Start: 2023-03-11 | End: 2023-03-12 | Stop reason: HOSPADM

## 2023-03-11 RX ORDER — CEFAZOLIN SODIUM 1 G/3ML
1 INJECTION, POWDER, FOR SOLUTION INTRAMUSCULAR; INTRAVENOUS EVERY 8 HOURS
Status: CANCELLED | OUTPATIENT
Start: 2023-03-11 | End: 2023-03-12

## 2023-03-11 RX ORDER — BISACODYL 10 MG
10 SUPPOSITORY, RECTAL RECTAL DAILY PRN
Status: CANCELLED | OUTPATIENT
Start: 2023-03-11

## 2023-03-11 RX ORDER — POLYETHYLENE GLYCOL 3350 17 G/17G
17 POWDER, FOR SOLUTION ORAL DAILY
Status: CANCELLED | OUTPATIENT
Start: 2023-03-12

## 2023-03-11 RX ORDER — ONDANSETRON 2 MG/ML
4 INJECTION INTRAMUSCULAR; INTRAVENOUS EVERY 6 HOURS PRN
Status: CANCELLED | OUTPATIENT
Start: 2023-03-11

## 2023-03-11 RX ORDER — HYDROMORPHONE HCL IN WATER/PF 6 MG/30 ML
0.4 PATIENT CONTROLLED ANALGESIA SYRINGE INTRAVENOUS
Status: CANCELLED | OUTPATIENT
Start: 2023-03-11

## 2023-03-11 RX ORDER — CEFAZOLIN SODIUM/WATER 2 G/20 ML
2 SYRINGE (ML) INTRAVENOUS SEE ADMIN INSTRUCTIONS
Status: DISCONTINUED | OUTPATIENT
Start: 2023-03-11 | End: 2023-03-12 | Stop reason: HOSPADM

## 2023-03-11 RX ORDER — KETAMINE HYDROCHLORIDE 10 MG/ML
INJECTION INTRAMUSCULAR; INTRAVENOUS PRN
Status: DISCONTINUED | OUTPATIENT
Start: 2023-03-11 | End: 2023-03-12

## 2023-03-11 RX ORDER — MORPHINE SULFATE 4 MG/ML
4 INJECTION, SOLUTION INTRAMUSCULAR; INTRAVENOUS ONCE
Status: COMPLETED | OUTPATIENT
Start: 2023-03-11 | End: 2023-03-11

## 2023-03-11 RX ADMIN — FENTANYL CITRATE 50 MCG: 50 INJECTION, SOLUTION INTRAMUSCULAR; INTRAVENOUS at 20:46

## 2023-03-11 RX ADMIN — Medication 2 G: at 23:15

## 2023-03-11 RX ADMIN — HYDROMORPHONE HYDROCHLORIDE 0.5 MG: 1 INJECTION, SOLUTION INTRAMUSCULAR; INTRAVENOUS; SUBCUTANEOUS at 11:45

## 2023-03-11 RX ADMIN — PHENYLEPHRINE HYDROCHLORIDE 300 MCG: 10 INJECTION INTRAVENOUS at 23:05

## 2023-03-11 RX ADMIN — HYDROMORPHONE HYDROCHLORIDE 0.2 MG: 0.2 INJECTION, SOLUTION INTRAMUSCULAR; INTRAVENOUS; SUBCUTANEOUS at 17:21

## 2023-03-11 RX ADMIN — OXYCODONE HYDROCHLORIDE 5 MG: 5 TABLET ORAL at 18:38

## 2023-03-11 RX ADMIN — ROCURONIUM BROMIDE 50 MG: 50 INJECTION, SOLUTION INTRAVENOUS at 23:02

## 2023-03-11 RX ADMIN — TRANEXAMIC ACID 1 G: 1 INJECTION, SOLUTION INTRAVENOUS at 21:34

## 2023-03-11 RX ADMIN — SODIUM CHLORIDE: 9 INJECTION, SOLUTION INTRAVENOUS at 16:08

## 2023-03-11 RX ADMIN — PROPOFOL 140 MG: 10 INJECTION, EMULSION INTRAVENOUS at 23:02

## 2023-03-11 RX ADMIN — PROPOFOL 50 MG: 10 INJECTION, EMULSION INTRAVENOUS at 23:48

## 2023-03-11 RX ADMIN — PROPOFOL 30 MCG/KG/MIN: 10 INJECTION, EMULSION INTRAVENOUS at 23:17

## 2023-03-11 RX ADMIN — HYDROMORPHONE HYDROCHLORIDE 0.5 MG: 1 INJECTION, SOLUTION INTRAMUSCULAR; INTRAVENOUS; SUBCUTANEOUS at 13:02

## 2023-03-11 RX ADMIN — SODIUM CHLORIDE, POTASSIUM CHLORIDE, SODIUM LACTATE AND CALCIUM CHLORIDE 100 ML/HR: 600; 310; 30; 20 INJECTION, SOLUTION INTRAVENOUS at 20:46

## 2023-03-11 RX ADMIN — ACETAMINOPHEN 975 MG: 325 TABLET ORAL at 20:39

## 2023-03-11 RX ADMIN — KETAMINE HYDROCHLORIDE 25 MG: 10 INJECTION, SOLUTION INTRAMUSCULAR; INTRAVENOUS at 23:49

## 2023-03-11 RX ADMIN — FENTANYL CITRATE 100 MCG: 50 INJECTION, SOLUTION INTRAMUSCULAR; INTRAVENOUS at 22:57

## 2023-03-11 RX ADMIN — MORPHINE SULFATE 4 MG: 4 INJECTION, SOLUTION INTRAMUSCULAR; INTRAVENOUS at 15:00

## 2023-03-11 RX ADMIN — HYDROMORPHONE HYDROCHLORIDE 0.5 MG: 1 INJECTION, SOLUTION INTRAMUSCULAR; INTRAVENOUS; SUBCUTANEOUS at 23:50

## 2023-03-11 RX ADMIN — LIDOCAINE HYDROCHLORIDE 2 ML: 10 INJECTION, SOLUTION INFILTRATION; PERINEURAL at 23:02

## 2023-03-11 RX ADMIN — ONDANSETRON 4 MG: 2 INJECTION INTRAMUSCULAR; INTRAVENOUS at 21:58

## 2023-03-11 ASSESSMENT — ACTIVITIES OF DAILY LIVING (ADL)
TOILETING_ISSUES: NO
CHANGE_IN_FUNCTIONAL_STATUS_SINCE_ONSET_OF_CURRENT_ILLNESS/INJURY: NO
ADLS_ACUITY_SCORE: 35
DRESSING/BATHING_DIFFICULTY: NO
NUMBER_OF_TIMES_PATIENT_HAS_FALLEN_WITHIN_LAST_SIX_MONTHS: 1
ADLS_ACUITY_SCORE: 35
DIFFICULTY_EATING/SWALLOWING: NO
ADLS_ACUITY_SCORE: 27
ADLS_ACUITY_SCORE: 26
ADLS_ACUITY_SCORE: 27
WALKING_OR_CLIMBING_STAIRS_DIFFICULTY: NO
DOING_ERRANDS_INDEPENDENTLY_DIFFICULTY: NO
WEAR_GLASSES_OR_BLIND: NO
ADLS_ACUITY_SCORE: 35
FALL_HISTORY_WITHIN_LAST_SIX_MONTHS: YES
ADLS_ACUITY_SCORE: 41
CONCENTRATING,_REMEMBERING_OR_MAKING_DECISIONS_DIFFICULTY: NO

## 2023-03-11 ASSESSMENT — ENCOUNTER SYMPTOMS
NECK PAIN: 0
ABDOMINAL PAIN: 0

## 2023-03-11 NOTE — PHARMACY-ADMISSION MEDICATION HISTORY
Pharmacy Note - Admission Medication History    Pertinent Provider Information: Patient takes most of her medications 4xWeekly (Takes Kendrick, M, W, F). Atorvastatin is taken every night.     ______________________________________________________________________    Prior To Admission (PTA) med list completed and updated in EMR.       PTA Med List   Medication Sig Last Dose     ascorbic acid, vitamin C, (VITAMIN C) 1000 MG tablet Take 1,000 mg by mouth four times a week Takes Su, M, W, F 3/10/2023     aspirin 81 MG EC tablet Take 81 mg by mouth four times a week Takes Su, M, W, F 3/10/2023     atorvastatin (LIPITOR) 10 MG tablet [ATORVASTATIN (LIPITOR) 10 MG TABLET] Take 5 mg by mouth at bedtime. 3/10/2023     calcium carbonate (CALCIUM 600 ORAL) Take 600 mg by mouth four times a week Takes Su, M, W, F 3/10/2023     levothyroxine (SYNTHROID, LEVOTHROID) 100 MCG tablet Take 100 mcg by mouth four times a week Takes Su, M, W, F 3/10/2023     magnesium 200 mg Tab Take 200 mg by mouth four times a week Takes Su, M, W, F 3/10/2023     multivitamin-minerals-lutein (CENTRUM SILVER) tablet Take 1 tablet by mouth four times a week Takes Su, M, W, F 3/10/2023     omega 3-dha-epa-fish oil (FISH OIL) 1,000 mg (120 mg-180 mg) cap Take 1 capsule by mouth four times a week Takes Su, M, W, F 3/10/2023     omeprazole (PRILOSEC) 20 MG capsule Take 20 mg by mouth four times a week Takes Su, M, W, F 3/10/2023       Information source(s): Patient and CareEverywhere/SureScripts  Method of interview communication: in-person    Summary of Changes to PTA Med List  Changed: aspirin, vitamin c, calcium, magnesiu, multivitamin, omeprazole.    Patient was asked about OTC/herbal products specifically.  PTA med list reflects this.    Allergies were reviewed, assessed, and updated with the patient.      Patient does not use any multi-dose medications prior to admission.    The information provided in this note is only as accurate as the sources available  at the time of the update(s).    Thank you for the opportunity to participate in the care of this patient.    Amol ALMENDAREZ Student Pharmacist  3/11/2023 1:07 PM

## 2023-03-11 NOTE — CONSULTS
ORTHOPEDIC CONSULTATION    Consultation  Cristianealka Birmingham DOB 1940, MRN [id number]    @[email or web address] @  Closed intertrochanteric fracture of hip, left, initial encounter (H) [S72.142U]    PCP: Wes Lyon, [phone]   Code status:  Full Code       Extended Emergency Contact Information  Primary Emergency Contact: Per Birmingham  Address: [address] [place] [address]  Home Phone: [phone]  Mobile Phone: [phone]  Relation: Spouse         IMPRESSION:   Left displaced femoral neck fracture after slip and fall on ice 3/11/2023     PLAN:  Preoperatively, the nature of the procedure, risks and benefits, as well as alternatives including nonsurgical management were discussed in detail with the patient. I reviewed and discussed the patient's condition and relevant images with the patient. We discussed options for further evaluation and treatment, including conservative non-operative management versus surgical intervention.      From a conservative standpoint, the patient can pursue non-operative management, which would include protected weight bearing to the lower extremity, which carries a high risk of morbidity and mortality due to prolonged and diminished mobilization/ambulation and it's associated complications, which include but are not limited to blood clots (DVT/PE), skin ulcerations and pressure sores, and pneumonia. In the long term, there is also the risk of chronic pain, fracture nonunion, fracture malunion, and avascular necrosis of the femoral head.    From a surgical standpoint, we discussed closed reduction vs open reduction and internal fixation. Given, the patient's fracture morphology and degree of displacement, internal fixation would also carry the risks of chronic pain, fracture nonunion, fracture malunion, and avascular necrosis of the femoral head. However, this procedure would likely be quicker, requiring less time under anesthesia and blood loss when  compared to hip arthroplasty.    We also discussed what left hip hemiarthroplasty involves, as well as the postoperative recovery and rehabilitation.  Given the above findings which include the patient s age and activity level, I have recommended a ana hip arthroplasty. This procedure has a high likelihood of providing immediate post-operative mobilization. The patient understands the indications and possible complications of surgery.    We also discussed at length the risks and benefits of arthroplasty surgery. Our discussion included but was not limited to the risk of pain, bleeding, infection, blood clots (DVT, PE), wound issues, continued chronic pain in the hip, post-operative joint stiffness, painful arc of motion, difficulty with ambulation, iatrogenic fracture, damage to nearby neurovascular structures, hip instability/dislocation,  implant wear, loosening and/or failure requiring revision, and possible post-operative leg length discrepancy (apparent or actual). The possibility of intra-operative and/or post-operative medical complications such as anesthesia complications or reactions, respiratory and cardiovascular events, stroke, heart attack and/or death were also discussed. In the case of infection of the joint, the patient understands that this will require prolonged IV antibiotic therapy and possible multiple operative procedures in the future.     The patient demonstrated an understanding of these risks as well as the potential benefits of arthroplasty surgery which would include possible improvement in pain, range of motion, and early ambulation. The patient demonstrated an understanding of the indications of surgery and all possible complications, and together we have decided to proceed with surgery. Specific details of the surgical procedure, hospitalization, recovery, rehabilitation, and long-term precautions were also presented. Following our discussion, she expressed a good understanding and  wished to proceed with surgery despite the risks involved.      -- N.p.o.  -- Plan for admission to the hospitalist team with preoperative evaluation for medical clearance  -- Tentative plan for left hip hemiarthroplasty this evening pending OR availability and medical clearance.    Thank you for including [facility] in the care of Cristiane Birmingham. It has been a pleasure participating in her care.        CHIEF COMPLAINT: Left hip pain after slip and fall on ice.      HISTORY OF PRESENT ILLNESS:   Cristiane is a very pleasant 82-year-old female with a past medical history notable for hypertension, hyperlipidemia, hypothyroidism who had a slip and fall on ice this morning.  She states that she was walking into [employer], when she slipped on a patch of ice, landing directly onto her left side.  She had immediate onset of left hip pain, and was unable to get up under her own power.  EMS was called, and she was then brought to the emergency department for further evaluation.  She denies hitting her head, no loss of consciousness.  No reports of additional extremity pain or injury.  Evaluation in the emergency department included x-rays that demonstrated a left displaced femoral neck fracture.  She denies any history of previous left hip symptoms or pain.  She ambulates without assistive device at baseline.  She states that she is otherwise healthy and active, enjoying participating in the [employer], playing organ/piano, teaching piano, and playing golf.  No history of previous surgery to the left hip.  She denies any numbness or tingling distally.  She takes baby aspirin, but no other blood thinners.    PAST MEDICAL HISTORY:  Past Medical History:   Diagnosis Date     Benign essential hypertension 11/4/2020     Breast cyst 2014     Colon polyp      Degenerative arthritis 11/4/2020     Diverticulitis of colon 11/4/2020     Dyspnea on exertion 2/6/2020     Gastroesophageal reflux disease 11/4/2020     Hyperlipidemia  11/4/2020     Hypothyroidism 11/4/2020     Inverted nipple     hx of inverted nipples     Palpitations 2/6/2020     PVC's (premature ventricular contractions) 2/6/2020     Senile incipient cataract 11/4/2020     Varicose veins of both lower extremities 11/4/2020         ALLERGIES:   Review of patient's allergies indicates   Allergies   Allergen Reactions     Fosamax [Alendronic Acid] Unknown     Metronidazole Unknown     Sulfa (Sulfonamide Antibiotics) [Sulfa Drugs] Unknown         MEDICATIONS UPON ADMISSION:    Current Facility-Administered Medications   Medication     acetaminophen (TYLENOL) tablet 650 mg    Or     acetaminophen (TYLENOL) Suppository 650 mg     atorvastatin (LIPITOR) half-tab 5 mg     bisacodyl (DULCOLAX) suppository 10 mg     hydrALAZINE (APRESOLINE) injection 10 mg     HYDROmorphone (DILAUDID) injection 0.2 mg     levothyroxine (SYNTHROID/LEVOTHROID) tablet 100 mcg     lidocaine (LMX4) cream     lidocaine 1 % 0.1-1 mL     melatonin tablet 1 mg     omeprazole (priLOSEC) CR capsule 20 mg     ondansetron (ZOFRAN ODT) ODT tab 4 mg    Or     ondansetron (ZOFRAN) injection 4 mg     senna-docusate (SENOKOT-S/PERICOLACE) 8.6-50 MG per tablet 1 tablet    Or     senna-docusate (SENOKOT-S/PERICOLACE) 8.6-50 MG per tablet 2 tablet     sodium chloride (PF) 0.9% PF flush 3 mL     sodium chloride (PF) 0.9% PF flush 3 mL     sodium chloride 0.9% infusion     Current Outpatient Medications   Medication     ascorbic acid, vitamin C, (VITAMIN C) 1000 MG tablet     aspirin 81 MG EC tablet     atorvastatin (LIPITOR) 10 MG tablet     calcium carbonate (CALCIUM 600 ORAL)     levothyroxine (SYNTHROID, LEVOTHROID) 100 MCG tablet     magnesium 200 mg Tab     multivitamin-minerals-lutein (CENTRUM SILVER) tablet     omega 3-dha-epa-fish oil (FISH OIL) 1,000 mg (120 mg-180 mg) cap     omeprazole (PRILOSEC) 20 MG capsule         SOCIAL HISTORY:   she  reports that she has quit smoking. She has never used smokeless  tobacco.      FAMILY HISTORY:  family history includes Brain Cancer in her maternal uncle; Breast Cancer in her cousin, cousin, and cousin; Breast Cancer (age of onset: 40.00) in her cousin; Cancer in her cousin, maternal uncle, and maternal uncle; Colon Cancer in her father, maternal grandfather, paternal uncle, and another family member; Colon Cancer (age of onset: 70.00) in her sister; Lung Cancer in her cousin, cousin, cousin, and cousin; Pancreatic Cancer in an other family member; Pancreatic Cancer (age of onset: 92.00) in her mother; Prostate Cancer in her paternal grandfather; Skin Cancer in her brother and brother; Stomach Cancer (age of onset: 50.00) in her cousin.      REVIEW OF SYSTEMS:   Reviewed with patient. See HPI, otherwise negative      PHYSICAL EXAMINATION:  Vitals: Temp:  [97.9  F (36.6  C)] 97.9  F (36.6  C)  Pulse:  [72-87] 87  Resp:  [22] 22  BP: (175-202)/(79-95) 175/88  SpO2:  [96 %-100 %] 96 %  General: On examination, the patient demonstrates mild distress secondary to pain.  Very pleasant, cooperative with exam and answers questions appropriately.  SKIN: Skin is intact, no rashes or lesions  Pulses: Good palpable distal pulses, toes are warm and well-perfused  Sensation: Sensation intact to light touch throughout the sural, saphenous, DP, SP, tibial nerve distributions  Tenderness: Pain at the left hip, no significant pain with palpation at the knee, shin, ankle, foot.  ROM: Range of motion deferred given known injury  Motor: She is able to fire EHL, FHL, TA, GSC.      RADIOGRAPHIC EVALUATION:  Personally reviewed AP pelvis obtained in the emergency department 3/11/2023 was personally reviewed, this demonstrates acute left femoral neck fracture, moderately displaced.  Mild degenerative changes at bilateral hips.  Generalized bone demineralization.  No other acute or concerning findings.    PERTINENT LABS:  Lab Results: personally reviewed.   Lab Results   Component Value Date      03/11/2023    CO2 23 03/11/2023    CO2 26 05/03/2021    BUN 13.1 03/11/2023    BUN 18 05/03/2021     Lab Results   Component Value Date    WBC 13.4 03/11/2023    HGB 14.9 03/11/2023    HCT 43.6 03/11/2023    MCV 93 03/11/2023     03/11/2023         Khadar Kennedy MD, MD  Date: 3/11/2023  Time: 3:52 PM    CC1:   Julio César Sellers MD    CC2:   Wes Lyon

## 2023-03-11 NOTE — H&P
[facility]    History and Physical - Hospitalist Service       Date of Admission:  3/11/2023    Assessment & Plan           Cristiane Birmingham is a 82 year old female admitted on 3/11/2023. She has been admitted post fall. Patient was walking into the [employer] today this am when she slipped on ice and fell on the ground following which she had severe left hip pain. Patient subsequently came to the ER for further evaluation and management. She was found to have Fracture through the left femoral neck without evidence for intertrochanteric extension. Will keep NPO, on iv fluids, plan to go to OR, ortho consulted, pain control. Patient is medically cleared for surgery.        A/p :         S/p fall, mechanical    Fracture through the left femoral neck without evidence for intertrochanteric extension.    Will keep NPO, on iv fluids, plan to go to OR, ortho consulted, pain control. Patient is medically cleared for surgery.        Leucocytosis : ? reactive      HLD : on statin      Hypothyroidism : on levothyroxine 100 mcg daily      GERD :on ppi      Elevated BP : on iv hydralazine prn                 Diet: NPO for Medical/Clinical Reasons Except for: No Exceptions    DVT Prophylaxis: Pneumatic Compression Devices  Yuen Catheter: Not present  Lines: None     Cardiac Monitoring: None  Code Status:    Full     Clinically Significant Risk Factors Present on Admission                               Disposition Plan      Expected Discharge Date: 03/13/2023                  Julio César Sellers MD  Hospitalist Service  [facility]  Securely message with [employer] (more info)  Text page via [employer] Paging/Directory     ______________________________________________________________________    Chief Complaint   fall    History is obtained from the patient    History of Present Illness       Cristiane Birmingham is a 82 year old female admitted on 3/11/2023. She has been admitted post fall. Patient was  walking into the [employer] today this am when she slipped on ice and fell on the ground following which she had severe left hip pain. Patient subsequently came to the ER for further evaluation and management. She was found to have Fracture through the left femoral neck without evidence for intertrochanteric extension. Will keep NPO, on iv fluids, plan to go to OR, ortho consulted, pain control. Patient is medically cleared for surgery.          Lives in [place] in a house  , 2 children  Denies smoking, alcohol - social  Functionally independent    Past Medical History    Past Medical History:   Diagnosis Date     Benign essential hypertension 11/4/2020     Breast cyst 2014     Colon polyp      Degenerative arthritis 11/4/2020     Diverticulitis of colon 11/4/2020     Dyspnea on exertion 2/6/2020     Gastroesophageal reflux disease 11/4/2020     Hyperlipidemia 11/4/2020     Hypothyroidism 11/4/2020     Inverted nipple     hx of inverted nipples     Palpitations 2/6/2020     PVC's (premature ventricular contractions) 2/6/2020     Senile incipient cataract 11/4/2020     Varicose veins of both lower extremities 11/4/2020       Past Surgical History   Past Surgical History:   Procedure Laterality Date     BIOPSY BREAST Left 2009     BIOPSY BREAST Right 2014    benign     BREAST CYST ASPIRATION Right 2014     STRIP VEIN       TUBAL LIGATION         Prior to Admission Medications   Prior to Admission Medications   Prescriptions Last Dose Informant Patient Reported? Taking?   ascorbic acid, vitamin C, (VITAMIN C) 1000 MG tablet 3/10/2023  Yes Yes   Sig: Take 1,000 mg by mouth four times a week Takes Kendrick, M, W, F   aspirin 81 MG EC tablet 3/10/2023  Yes Yes   Sig: Take 81 mg by mouth four times a week Takes Kendrick, M, W, F   atorvastatin (LIPITOR) 10 MG tablet 3/10/2023  Yes Yes   Sig: [ATORVASTATIN (LIPITOR) 10 MG TABLET] Take 5 mg by mouth at bedtime.   calcium carbonate (CALCIUM 600 ORAL) 3/10/2023  Yes Yes   Sig: Take 600  mg by mouth four times a week Takes Kendrick, M, W, F   levothyroxine (SYNTHROID, LEVOTHROID) 100 MCG tablet 3/10/2023  Yes Yes   Sig: Take 100 mcg by mouth four times a week Takes Kendrick, M, W, F   magnesium 200 mg Tab 3/10/2023  Yes Yes   Sig: Take 200 mg by mouth four times a week Takes Kendrick, M, W, F   multivitamin-minerals-lutein (CENTRUM SILVER) tablet 3/10/2023  Yes Yes   Sig: Take 1 tablet by mouth four times a week Takes Kendrick, M, W, F   omega 3-dha-epa-fish oil (FISH OIL) 1,000 mg (120 mg-180 mg) cap 3/10/2023  Yes Yes   Sig: Take 1 capsule by mouth four times a week Takes Kendrick, M, W, F   omeprazole (PRILOSEC) 20 MG capsule 3/10/2023  Yes Yes   Sig: Take 20 mg by mouth four times a week Takes Kendrick, M, W, F      Facility-Administered Medications: None        Review of Systems            No fever or chills  No cp, sob, cough or phlegm  No abdominal symptoms  No urinary symptoms  No neuro symptoms    Physical Exam   Vital Signs: Temp: 97.9  F (36.6  C)   BP: (!) 175/88 Pulse: 87   Resp: 22 SpO2: 96 % O2 Device: None (Room air)    Weight: 150 lbs 0 oz       GENERAL: The patient is not in any acute distressed. Awake and alert.  HEENT: Nonicteric sclerae, PERRLA, EOMI. Oropharynx clear. Moist mucous membranes. Conjunctivae appear well perfused.  HEART: Regular rate and rhythm without murmurs.  LUNGS: Clear to auscultation bilaterally. No wheezing or crackles.  ABDOMEN: Soft, positive bowel sounds, nontender.  SKIN: No rash, no excessive bruising, petechiae, or purpura.  EXTREMITIES : no rashes, no swelling in legs.  NEUROLOGIC: conscious and oriented, follows commands, no obvious focal deficits.  ROS: All other systems negative       Medical Decision Making       60 MINUTES SPENT BY ME on the date of service doing chart review, history, exam, documentation & further activities per the note.  MANAGEMENT DISCUSSED with the following over the past 24 hours: rn, patient       Data     I have personally reviewed the following data over  the past 24 hrs:    13.4 (H)  \   14.9   / 235     139 104 13.1 /  142 (H)   3.7 23 0.57 \       INR:  0.94 PTT:  N/A   D-dimer:  N/A Fibrinogen:  N/A

## 2023-03-11 NOTE — ED NOTES
"[facility] ED Handoff Report    ED Chief Complaint: Left hip pain    ED Diagnosis:  (S72.002A) Closed fracture of neck of left femur, initial encounter (H)  (primary encounter diagnosis)  Comment:   Plan: Case Request: HEMIARTHROPLASTY, HIP, BIPOLAR            (W19.XXXA) Fall, initial encounter  Comment:   Plan:     (S72.142A) Closed intertrochanteric fracture of hip, left, initial encounter (H)  Comment:   Plan:        PMH:    Past Medical History:   Diagnosis Date    Benign essential hypertension 11/4/2020    Breast cyst 2014    Colon polyp     Degenerative arthritis 11/4/2020    Diverticulitis of colon 11/4/2020    Dyspnea on exertion 2/6/2020    Gastroesophageal reflux disease 11/4/2020    Hyperlipidemia 11/4/2020    Hypothyroidism 11/4/2020    Inverted nipple     hx of inverted nipples    Palpitations 2/6/2020    PVC's (premature ventricular contractions) 2/6/2020    Senile incipient cataract 11/4/2020    Varicose veins of both lower extremities 11/4/2020        Code Status:  Full Code     Falls Risk: Yes Band: Applied    Current Living Situation/Residence: lives with a significant other     Elimination Status: Continent: Yes     Activity Level: Total assist/lift    Patients Preferred Language:  English     Needed: No    Vital Signs:  BP (!) 175/88   Pulse 87   Temp 97.9  F (36.6  C)   Resp 22   Ht 1.651 m (5' 5\")   Wt 68 kg (150 lb)   SpO2 96%   BMI 24.96 kg/m       Cardiac Rhythm:     Pain Score: 6/10    Is the Patient Confused:  No    Last Food or Drink: 03/11/23 at 0900    Focused Assessment:  Pt was walking into [employer] today when she slipped on some ice under a light covering of snow. Pt states she fell onto her left hip on cement. Pt denies hitting her head, LOC, neck and back pain. Pt states she was outside for approximately 10 minutes before being helped off the ground by people at the [employer].   Medics administered 75 mcg Fentanyl for pain control with minimal relief   CMS " intact x4  Denies blood thinners but states she takes a baby aspirin 4 times a week    Pt scheduled for surgery tonight. Plan sounds like a partial hip replacement left side.    Currently has purewick in place.       Tests Performed: Done: Labs and Imaging    Treatments Provided:      Family Dynamics/Concerns: No    Family Updated On Visitor Policy: Yes    Plan of Care Communicated to Family: Yes    Who Was Updated about Plan of Care: spouse    Belongings Checklist Done and Signed by Patient: Yes    Medications sent with patient:     Covid: asymptomatic , NA    Additional Information:     RN: Best Kaur RN   3/11/2023 4:10 PM

## 2023-03-11 NOTE — ED TRIAGE NOTES
Pt arrives by [facility] with report of left hip pain with shortening and rotation of the left leg    Pt was walking into [employer] today when she slipped on some ice under a light covering of snow. Pt states she fell onto her left hip on cement. Pt denies hitting her head, LOC, neck and back pain. Pt states she was outside for approximately 10 minutes before being helped off the ground by people at the [employer].   Medics administered 75 mcg Fentanyl for pain control with minimal relief   CMS intact x4  Denies blood thinners but states she takes a baby aspirin 4 times a week     Triage Assessment       Row Name 03/11/23 1106       Triage Assessment (Adult)    Airway WDL WDL       Respiratory WDL    Respiratory WDL WDL       Skin Circulation/Temperature WDL    Skin Circulation/Temperature WDL WDL       Cardiac WDL    Cardiac WDL WDL       Peripheral/Neurovascular WDL    Peripheral Neurovascular WDL WDL       Cognitive/Neuro/Behavioral WDL    Cognitive/Neuro/Behavioral WDL WDL

## 2023-03-11 NOTE — ED PROVIDER NOTES
EMERGENCY DEPARTMENT ENCOUNTER      NAME: Cristiane Birmingham  AGE: 82 year old female  YOB: 1940  MRN: [id number]  EVALUATION DATE & TIME: 3/11/2023 10:57 AM    PCP: Wes Lyno    ED PROVIDER: Diana Vee M.D.      CHIEF COMPLAINT     Chief Complaint   Patient presents with     Hip Pain         FINAL IMPRESSION:     1. Closed fracture of neck of left femur, initial encounter (H)    2. Fall, initial encounter    3. Closed intertrochanteric fracture of hip, left, initial encounter (H)          MEDICAL DECISION MAKING:       Pertinent Labs & Imaging studies reviewed. (See chart for details)    82 year old female presents to the Emergency Department for evaluation of fall    ED Course as of 03/11/23 1416   Sat Mar 11, 2023   1412 Mrs. Vinnie Aleman 82-year-old female who accidentally fell landing on the left hip.  Did not think lost consciousness.  Unable to get up.  EMS was called IV was established.   1412 Patient said this was an accident he was IC.  Does not think she hit her head.  Not anticoagulated.   1413 Examination she appears in pain.  Shortened left hip.  No evidence of head trauma no hyphema no hemotympanum no malalignment of the teeth no midline cervical thoracic lumbar tenderness palpation abdomen is soft.   1413 Differential diagnosis include but not limited to dislocation of the hip fracture of the head head injury nonaccidental trauma intra-abdominal pathology among others.   1413 IV established patient given Dilaudid for pain x-ray does reveal an intertrochanteric fracture.  Discussed with orthopedic surgery plan is to keep her n.p.o. and have her medically clear by internal medicine for operative management.   1413 Spoke with Dr. Billings who accepts patient MedSurg inpatient.   1414 Patient reevaluated stated pain is better neurovascular intact.       Vital Signs: hypertension  EKG: none  Imaging: CT head CT spine negative.  Pelvis x-ray intertrochanter fracture.  Home Meds:  Reviewed  ED meds/abx: Dilaudid  Fluids: tkp    Labs  Pending at the time of the dictation      Medical Decision Making    History:  Supplemental history from: Documented in chart, if applicable, Family Member/Significant Other and EMS  External Record(s) reviewed: Documented in chart, if applicable.    Work Up:  Chart documentation includes differential considered and any EKGs or imaging independently interpreted by provider, where specified.  In additional to work up documented, I considered the following work up: Documented in chart, if applicable.    External consultation:  Discussion of management with another provider: Documented in chart, if applicable    Complicating factors:  Care impacted by chronic illness: N/A  Care affected by social determinants of health: N/A    Disposition considerations: Admit.          Review of Previous Records        Consults    Orthopedics - BILLY Hanna  Hospitalist - Dr. Sellers.    ED COURSE     11:23 AM I performed my initial interview and exam.   11:29 AM I paged for orthopedics.  11:55 AM I spoke with BILLY Hanna [facility].  12:15 PM I spoke with BILLY Hanna [facility].  12:20 PM I spoke with BILLY Hanna [facility].  12:36 PM I spoke with Dr. Sellers, Hospitalist, who agrees to med surg inpatient admission.   2:15 PM evaluated states pain is better controlled.    At the conclusion of the encounter I discussed the results of all of the tests and the disposition. The questions were answered. The patient and her  acknowledged understanding and was agreeable with the care plan.       MEDICATIONS GIVEN IN THE EMERGENCY:     Medications   HYDROmorphone (PF) (DILAUDID) injection 0.5 mg (0.5 mg Intravenous $Given 3/11/23 1147)   HYDROmorphone (PF) (DILAUDID) injection 0.5 mg (0.5 mg Intravenous $Given 3/11/23 1302)       NEW PRESCRIPTIONS STARTED AT TODAY'S ER VISIT     New Prescriptions    No medications on file           =================================================================    HPI     Patient information was obtained from: Patient, , and EMS    Use of : N/A       Cristiane Birmingham is a 82 year old female who presents by ambulance for evaluation of hip pain.    Patient reports that she was walking into [employer] when she slipped on some ice. Patient reports that she grabbed a railing with her right hand but still fell to the ground and landed on her left side. Patient complains of left hip pain after the fall. Patient was given pain medication by EMS en route to the ED.    Patient endorses taking baby aspirin daily.Patient denies alcohol use or tobacco use.    Patient denies head trauma, neck pain, chest pain, abdominal pain, or any other concerns at this time.    REVIEW OF SYSTEMS   Review of Systems   Cardiovascular: Negative for chest pain.   Gastrointestinal: Negative for abdominal pain.   Musculoskeletal: Negative for neck pain.        Positive for left hip pain.   All other systems reviewed and are negative.       PAST MEDICAL HISTORY:     Past Medical History:   Diagnosis Date     Benign essential hypertension 11/4/2020     Breast cyst 2014     Colon polyp      Degenerative arthritis 11/4/2020     Diverticulitis of colon 11/4/2020     Dyspnea on exertion 2/6/2020     Gastroesophageal reflux disease 11/4/2020     Hyperlipidemia 11/4/2020     Hypothyroidism 11/4/2020     Inverted nipple     hx of inverted nipples     Palpitations 2/6/2020     PVC's (premature ventricular contractions) 2/6/2020     Senile incipient cataract 11/4/2020     Varicose veins of both lower extremities 11/4/2020       PAST SURGICAL HISTORY:     Past Surgical History:   Procedure Laterality Date     BIOPSY BREAST Left 2009     BIOPSY BREAST Right 2014    benign     BREAST CYST ASPIRATION Right 2014     STRIP VEIN       TUBAL LIGATION           CURRENT MEDICATIONS:   ascorbic acid, vitamin C, (VITAMIN C) 1000 MG  tablet  aspirin 81 MG EC tablet  atorvastatin (LIPITOR) 10 MG tablet  calcium carbonate (CALCIUM 600 ORAL)  levothyroxine (SYNTHROID, LEVOTHROID) 100 MCG tablet  magnesium 200 mg Tab  multivitamin-minerals-lutein (CENTRUM SILVER) tablet  omega 3-dha-epa-fish oil (FISH OIL) 1,000 mg (120 mg-180 mg) cap  omeprazole (PRILOSEC) 20 MG capsule         ALLERGIES:     Allergies   Allergen Reactions     Fosamax [Alendronic Acid] Unknown     Metronidazole Unknown     Sulfa (Sulfonamide Antibiotics) [Sulfa Drugs] Unknown       FAMILY HISTORY:     Family History   Problem Relation Age of Onset     Pancreatic Cancer Mother 92.00     Colon Cancer Sister 70.00     Colon Cancer Maternal Grandfather      Prostate Cancer Paternal Grandfather      Breast Cancer Cousin         paternal half cousin, onset in 30s     Breast Cancer Cousin         paternal half cousin, onset in 50s     Breast Cancer Cousin 40.00        paternal half cousin     Skin Cancer Brother      Brain Cancer Maternal Uncle          at 39     Colon Cancer Paternal Uncle      Stomach Cancer Cousin 50.00        paternal half cousin     Cancer Cousin         paternal half cousin, sinus cancer     Breast Cancer Cousin         maternal first-cousin, onset 40s     Lung Cancer Cousin         maternal first-cousin,  around 50     Lung Cancer Cousin         maternal first-cousin     Lung Cancer Cousin         materanl first-cousin     Lung Cancer Cousin         maternal first-cousin,  at 39     Cancer Maternal Uncle         throat cancer     Cancer Maternal Uncle         type not specified     Skin Cancer Brother      Colon Cancer Other         paternal half uncle     Pancreatic Cancer Other         paternal half uncle     Colon Cancer Father         onset in 80s       SOCIAL HISTORY:     Social History     Socioeconomic History     Marital status:    Tobacco Use     Smoking status: Former     Smokeless tobacco: Never     Tobacco comments:     briefly while  "in college       VITALS:   BP (!) 202/95   Pulse 73   Temp 97.9  F (36.6  C)   Resp 22   Ht 1.651 m (5' 5\")   Wt 68 kg (150 lb)   SpO2 100%   BMI 24.96 kg/m      PHYSICAL EXAM     Physical Exam  Vitals and nursing note reviewed. Exam conducted with a chaperone present.   Constitutional:       Appearance: Normal appearance. She is normal weight.   Musculoskeletal:         General: Tenderness, deformity and signs of injury present.   Skin:     General: Skin is warm.   Neurological:      General: No focal deficit present.      Mental Status: She is alert and oriented to person, place, and time.         Physical Exam   Constitutional: Well appearing.    Head: Atraumatic.     Nose: Nose normal.     Mouth/Throat: Oropharynx is clear and moist.     Eyes: EOM are normal. Pupils are equal, round, and reactive to light. No hyphema    Ears: No hemotympanum    Neck: Normal range of motion. Neck supple.     Cardiovascular: Normal rate, regular rhythm and normal heart sounds.      Pulmonary/Chest: Normal effort  and breath sounds normal.     Abdominal: Non-tender.    Musculoskeletal: Left hip shortened and tender to palpation.     Neurological: No deficits.    Lymphatics: No edema.    : N/A    Skin: Skin is warm and dry.     Psychiatric: Normal mood and affect. Behavior is normal.       LAB:     All pertinent labs reviewed and interpreted.  Labs Ordered and Resulted from Time of ED Arrival to Time of ED Departure - No data to display     RADIOLOGY:     Reviewed all pertinent imaging. Please see official radiology report.  Cervical spine CT w/o contrast   Final Result   IMPRESSION:   HEAD CT:   1.  No intracranial hemorrhage, mass lesions, hydrocephalus or CT evidence for an acute infarct.   2.  Mild diffuse cerebral parenchymal volume loss. Presumed chronic hypertensive/microvascular ischemic white matter changes.      CERVICAL SPINE CT:   1.  No CT evidence for acute fracture or post traumatic subluxation.      Head CT " w/o contrast   Final Result   IMPRESSION:   HEAD CT:   1.  No intracranial hemorrhage, mass lesions, hydrocephalus or CT evidence for an acute infarct.   2.  Mild diffuse cerebral parenchymal volume loss. Presumed chronic hypertensive/microvascular ischemic white matter changes.      CERVICAL SPINE CT:   1.  No CT evidence for acute fracture or post traumatic subluxation.      XR Pelvis 1/2 Views   Final Result   IMPRESSION: Fracture through the left femoral neck without evidence for intertrochanteric extension. No dislocation. Degenerative change both hip joints. Diffuse demineralization. Pelvis negative for fracture.              EKG:       I have independently reviewed and interpreted the EKG(s) documented above.      PROCEDURES:     Procedures      I, Geoff Goldstein, am serving as a scribe to document services personally performed by Dr. Vee based on my observation and the provider's statements to me. I, Diana Vee MD attest that Geoff Goldstein is acting in a scribe capacity, has observed my performance of the services and has documented them in accordance with my direction.    Diana Vee M.D.  Emergency Medicine  [facility] EMERGENCY DEPARTMENT  [address] [place] [id number]  [phone]  Dept: [phone]       Diana Vee MD  03/11/23 4804

## 2023-03-12 ENCOUNTER — APPOINTMENT (OUTPATIENT)
Dept: OCCUPATIONAL THERAPY | Facility: HOSPITAL | Age: 83
DRG: 522 | End: 2023-03-12
Payer: COMMERCIAL

## 2023-03-12 ENCOUNTER — APPOINTMENT (OUTPATIENT)
Dept: PHYSICAL THERAPY | Facility: HOSPITAL | Age: 83
DRG: 522 | End: 2023-03-12
Payer: COMMERCIAL

## 2023-03-12 ENCOUNTER — APPOINTMENT (OUTPATIENT)
Dept: RADIOLOGY | Facility: HOSPITAL | Age: 83
DRG: 522 | End: 2023-03-12
Payer: COMMERCIAL

## 2023-03-12 LAB
CREAT SERPL-MCNC: 0.67 MG/DL (ref 0.51–0.95)
GFR SERPL CREATININE-BSD FRML MDRD: 88 ML/MIN/1.73M2
HGB BLD-MCNC: 12.2 G/DL (ref 11.7–15.7)
HOLD SPECIMEN: NORMAL

## 2023-03-12 PROCEDURE — 82565 ASSAY OF CREATININE: CPT

## 2023-03-12 PROCEDURE — 250N000011 HC RX IP 250 OP 636: Performed by: INTERNAL MEDICINE

## 2023-03-12 PROCEDURE — 250N000009 HC RX 250: Performed by: ANESTHESIOLOGY

## 2023-03-12 PROCEDURE — 97165 OT EVAL LOW COMPLEX 30 MIN: CPT | Mod: GO

## 2023-03-12 PROCEDURE — 250N000013 HC RX MED GY IP 250 OP 250 PS 637: Performed by: INTERNAL MEDICINE

## 2023-03-12 PROCEDURE — 250N000011 HC RX IP 250 OP 636: Performed by: STUDENT IN AN ORGANIZED HEALTH CARE EDUCATION/TRAINING PROGRAM

## 2023-03-12 PROCEDURE — 258N000001 HC RX 258: Performed by: STUDENT IN AN ORGANIZED HEALTH CARE EDUCATION/TRAINING PROGRAM

## 2023-03-12 PROCEDURE — 999N000063 XR PELVIS AND HIP PORTABLE LEFT 2 VIEWS

## 2023-03-12 PROCEDURE — 36415 COLL VENOUS BLD VENIPUNCTURE: CPT

## 2023-03-12 PROCEDURE — 85018 HEMOGLOBIN: CPT | Performed by: INTERNAL MEDICINE

## 2023-03-12 PROCEDURE — 250N000011 HC RX IP 250 OP 636

## 2023-03-12 PROCEDURE — 258N000003 HC RX IP 258 OP 636

## 2023-03-12 PROCEDURE — 250N000011 HC RX IP 250 OP 636: Performed by: ANESTHESIOLOGY

## 2023-03-12 PROCEDURE — 97110 THERAPEUTIC EXERCISES: CPT | Mod: GO

## 2023-03-12 PROCEDURE — 97110 THERAPEUTIC EXERCISES: CPT | Mod: GP

## 2023-03-12 PROCEDURE — 258N000003 HC RX IP 258 OP 636: Performed by: INTERNAL MEDICINE

## 2023-03-12 PROCEDURE — 99233 SBSQ HOSP IP/OBS HIGH 50: CPT | Performed by: INTERNAL MEDICINE

## 2023-03-12 PROCEDURE — 97530 THERAPEUTIC ACTIVITIES: CPT | Mod: GP

## 2023-03-12 PROCEDURE — 250N000013 HC RX MED GY IP 250 OP 250 PS 637

## 2023-03-12 PROCEDURE — 120N000001 HC R&B MED SURG/OB

## 2023-03-12 PROCEDURE — 36415 COLL VENOUS BLD VENIPUNCTURE: CPT | Performed by: INTERNAL MEDICINE

## 2023-03-12 PROCEDURE — 97162 PT EVAL MOD COMPLEX 30 MIN: CPT | Mod: GP

## 2023-03-12 DEVICE — ARTICUL/EZE FEMORAL HEAD DIAMETER 28MM +8.5 12/14 TAPER
Type: IMPLANTABLE DEVICE | Site: HIP | Status: NON-FUNCTIONAL
Brand: ARTICUL/EZE
Removed: 2024-02-07

## 2023-03-12 DEVICE — SELF CENTERING BI-POLAR HEAD 28MM ID 49MM OD
Type: IMPLANTABLE DEVICE | Site: HIP | Status: NON-FUNCTIONAL
Brand: SELF CENTERING
Removed: 2024-02-07

## 2023-03-12 DEVICE — SUMMIT FEMORAL STEM 12/14 TAPER CEMENTED SIZE 3 HI 108MM
Type: IMPLANTABLE DEVICE | Site: HIP | Status: FUNCTIONAL
Brand: SUMMIT

## 2023-03-12 DEVICE — FULL DOSE BONE CEMENT, 10 PACK CATALOG NUMBER IS 6191-1-010
Type: IMPLANTABLE DEVICE | Site: HIP | Status: FUNCTIONAL
Brand: SIMPLEX

## 2023-03-12 DEVICE — PLUG CEMENT BUCK W/INSERT 18.5 71279422: Type: IMPLANTABLE DEVICE | Site: HIP | Status: FUNCTIONAL

## 2023-03-12 DEVICE — CEMENTRALIZER STEM CENTRALIZER 10.0MM CEMENTED
Type: IMPLANTABLE DEVICE | Site: HIP | Status: FUNCTIONAL
Brand: CEMENTRALIZER

## 2023-03-12 RX ORDER — EPHEDRINE SULFATE 50 MG/ML
INJECTION, SOLUTION INTRAMUSCULAR; INTRAVENOUS; SUBCUTANEOUS PRN
Status: DISCONTINUED | OUTPATIENT
Start: 2023-03-12 | End: 2023-03-12

## 2023-03-12 RX ORDER — FENTANYL CITRATE 50 UG/ML
50 INJECTION, SOLUTION INTRAMUSCULAR; INTRAVENOUS EVERY 5 MIN PRN
Status: DISCONTINUED | OUTPATIENT
Start: 2023-03-12 | End: 2023-03-12 | Stop reason: HOSPADM

## 2023-03-12 RX ORDER — VANCOMYCIN HYDROCHLORIDE 1 G/20ML
INJECTION, POWDER, LYOPHILIZED, FOR SOLUTION INTRAVENOUS PRN
Status: DISCONTINUED | OUTPATIENT
Start: 2023-03-12 | End: 2023-03-12 | Stop reason: HOSPADM

## 2023-03-12 RX ORDER — SODIUM CHLORIDE, SODIUM LACTATE, POTASSIUM CHLORIDE, CALCIUM CHLORIDE 600; 310; 30; 20 MG/100ML; MG/100ML; MG/100ML; MG/100ML
INJECTION, SOLUTION INTRAVENOUS CONTINUOUS
Status: DISCONTINUED | OUTPATIENT
Start: 2023-03-12 | End: 2023-03-12

## 2023-03-12 RX ORDER — ONDANSETRON 4 MG/1
4 TABLET, ORALLY DISINTEGRATING ORAL EVERY 6 HOURS PRN
Status: DISCONTINUED | OUTPATIENT
Start: 2023-03-12 | End: 2023-03-14 | Stop reason: HOSPADM

## 2023-03-12 RX ORDER — OXYCODONE HYDROCHLORIDE 5 MG/1
10 TABLET ORAL EVERY 4 HOURS PRN
Status: DISCONTINUED | OUTPATIENT
Start: 2023-03-12 | End: 2023-03-13

## 2023-03-12 RX ORDER — PROCHLORPERAZINE MALEATE 5 MG
5 TABLET ORAL EVERY 6 HOURS PRN
Status: DISCONTINUED | OUTPATIENT
Start: 2023-03-12 | End: 2023-03-14 | Stop reason: HOSPADM

## 2023-03-12 RX ORDER — ONDANSETRON 2 MG/ML
4 INJECTION INTRAMUSCULAR; INTRAVENOUS EVERY 6 HOURS PRN
Status: DISCONTINUED | OUTPATIENT
Start: 2023-03-12 | End: 2023-03-14 | Stop reason: HOSPADM

## 2023-03-12 RX ORDER — LABETALOL HYDROCHLORIDE 5 MG/ML
INJECTION, SOLUTION INTRAVENOUS PRN
Status: DISCONTINUED | OUTPATIENT
Start: 2023-03-12 | End: 2023-03-12

## 2023-03-12 RX ORDER — AMOXICILLIN 250 MG
1 CAPSULE ORAL 2 TIMES DAILY
Status: DISCONTINUED | OUTPATIENT
Start: 2023-03-12 | End: 2023-03-13

## 2023-03-12 RX ORDER — POLYETHYLENE GLYCOL 3350 17 G/17G
17 POWDER, FOR SOLUTION ORAL DAILY
Status: DISCONTINUED | OUTPATIENT
Start: 2023-03-13 | End: 2023-03-14 | Stop reason: HOSPADM

## 2023-03-12 RX ORDER — SODIUM CHLORIDE, SODIUM LACTATE, POTASSIUM CHLORIDE, CALCIUM CHLORIDE 600; 310; 30; 20 MG/100ML; MG/100ML; MG/100ML; MG/100ML
INJECTION, SOLUTION INTRAVENOUS CONTINUOUS
Status: DISCONTINUED | OUTPATIENT
Start: 2023-03-12 | End: 2023-03-12 | Stop reason: HOSPADM

## 2023-03-12 RX ORDER — ONDANSETRON 4 MG/1
4 TABLET, ORALLY DISINTEGRATING ORAL EVERY 30 MIN PRN
Status: DISCONTINUED | OUTPATIENT
Start: 2023-03-12 | End: 2023-03-12 | Stop reason: HOSPADM

## 2023-03-12 RX ORDER — OXYCODONE HYDROCHLORIDE 5 MG/1
5 TABLET ORAL EVERY 4 HOURS PRN
Status: DISCONTINUED | OUTPATIENT
Start: 2023-03-12 | End: 2023-03-13

## 2023-03-12 RX ORDER — FENTANYL CITRATE 50 UG/ML
25 INJECTION, SOLUTION INTRAMUSCULAR; INTRAVENOUS EVERY 5 MIN PRN
Status: DISCONTINUED | OUTPATIENT
Start: 2023-03-12 | End: 2023-03-12 | Stop reason: HOSPADM

## 2023-03-12 RX ORDER — ONDANSETRON 2 MG/ML
INJECTION INTRAMUSCULAR; INTRAVENOUS PRN
Status: DISCONTINUED | OUTPATIENT
Start: 2023-03-12 | End: 2023-03-12

## 2023-03-12 RX ORDER — LIDOCAINE 40 MG/G
CREAM TOPICAL
Status: DISCONTINUED | OUTPATIENT
Start: 2023-03-12 | End: 2023-03-14 | Stop reason: HOSPADM

## 2023-03-12 RX ORDER — ONDANSETRON 2 MG/ML
4 INJECTION INTRAMUSCULAR; INTRAVENOUS EVERY 30 MIN PRN
Status: DISCONTINUED | OUTPATIENT
Start: 2023-03-12 | End: 2023-03-12 | Stop reason: HOSPADM

## 2023-03-12 RX ORDER — ACETAMINOPHEN 325 MG/1
975 TABLET ORAL EVERY 8 HOURS
Status: DISCONTINUED | OUTPATIENT
Start: 2023-03-12 | End: 2023-03-13

## 2023-03-12 RX ORDER — HYDROMORPHONE HCL IN WATER/PF 6 MG/30 ML
0.2 PATIENT CONTROLLED ANALGESIA SYRINGE INTRAVENOUS
Status: DISCONTINUED | OUTPATIENT
Start: 2023-03-12 | End: 2023-03-14 | Stop reason: HOSPADM

## 2023-03-12 RX ORDER — ENOXAPARIN SODIUM 100 MG/ML
40 INJECTION SUBCUTANEOUS EVERY 24 HOURS
Status: DISCONTINUED | OUTPATIENT
Start: 2023-03-13 | End: 2023-03-14 | Stop reason: HOSPADM

## 2023-03-12 RX ORDER — BISACODYL 10 MG
10 SUPPOSITORY, RECTAL RECTAL DAILY PRN
Status: DISCONTINUED | OUTPATIENT
Start: 2023-03-12 | End: 2023-03-13

## 2023-03-12 RX ORDER — HYDROMORPHONE HCL IN WATER/PF 6 MG/30 ML
0.4 PATIENT CONTROLLED ANALGESIA SYRINGE INTRAVENOUS
Status: DISCONTINUED | OUTPATIENT
Start: 2023-03-12 | End: 2023-03-14 | Stop reason: HOSPADM

## 2023-03-12 RX ORDER — CEFAZOLIN SODIUM 1 G/3ML
1 INJECTION, POWDER, FOR SOLUTION INTRAMUSCULAR; INTRAVENOUS EVERY 8 HOURS
Status: COMPLETED | OUTPATIENT
Start: 2023-03-12 | End: 2023-03-12

## 2023-03-12 RX ORDER — ACETAMINOPHEN 325 MG/1
650 TABLET ORAL EVERY 4 HOURS PRN
Status: DISCONTINUED | OUTPATIENT
Start: 2023-03-15 | End: 2023-03-13

## 2023-03-12 RX ADMIN — PHENYLEPHRINE HYDROCHLORIDE 200 MCG: 10 INJECTION INTRAVENOUS at 01:24

## 2023-03-12 RX ADMIN — SODIUM CHLORIDE, POTASSIUM CHLORIDE, SODIUM LACTATE AND CALCIUM CHLORIDE: 600; 310; 30; 20 INJECTION, SOLUTION INTRAVENOUS at 09:04

## 2023-03-12 RX ADMIN — ATORVASTATIN CALCIUM 5 MG: 10 TABLET, FILM COATED ORAL at 22:29

## 2023-03-12 RX ADMIN — ACETAMINOPHEN 975 MG: 325 TABLET ORAL at 16:35

## 2023-03-12 RX ADMIN — SENNOSIDES AND DOCUSATE SODIUM 1 TABLET: 50; 8.6 TABLET ORAL at 09:02

## 2023-03-12 RX ADMIN — LABETALOL HYDROCHLORIDE 5 MG: 5 INJECTION, SOLUTION INTRAVENOUS at 01:47

## 2023-03-12 RX ADMIN — HYDROMORPHONE HYDROCHLORIDE 0.4 MG: 0.2 INJECTION, SOLUTION INTRAMUSCULAR; INTRAVENOUS; SUBCUTANEOUS at 12:02

## 2023-03-12 RX ADMIN — SUGAMMADEX 150 MG: 100 INJECTION, SOLUTION INTRAVENOUS at 01:43

## 2023-03-12 RX ADMIN — OXYCODONE HYDROCHLORIDE 5 MG: 5 TABLET ORAL at 13:34

## 2023-03-12 RX ADMIN — OXYCODONE HYDROCHLORIDE 5 MG: 5 TABLET ORAL at 16:57

## 2023-03-12 RX ADMIN — PHENYLEPHRINE HYDROCHLORIDE 100 MCG: 10 INJECTION INTRAVENOUS at 00:45

## 2023-03-12 RX ADMIN — CEFAZOLIN 1 G: 1 INJECTION, POWDER, FOR SOLUTION INTRAMUSCULAR; INTRAVENOUS at 09:05

## 2023-03-12 RX ADMIN — PHENYLEPHRINE HYDROCHLORIDE 150 MCG: 10 INJECTION INTRAVENOUS at 01:15

## 2023-03-12 RX ADMIN — PANTOPRAZOLE SODIUM 40 MG: 40 TABLET, DELAYED RELEASE ORAL at 09:02

## 2023-03-12 RX ADMIN — LABETALOL HYDROCHLORIDE 5 MG: 5 INJECTION, SOLUTION INTRAVENOUS at 00:26

## 2023-03-12 RX ADMIN — PHENYLEPHRINE HYDROCHLORIDE 50 MCG: 10 INJECTION INTRAVENOUS at 00:55

## 2023-03-12 RX ADMIN — HYDROMORPHONE HYDROCHLORIDE 0.4 MG: 0.2 INJECTION, SOLUTION INTRAMUSCULAR; INTRAVENOUS; SUBCUTANEOUS at 20:56

## 2023-03-12 RX ADMIN — ONDANSETRON 4 MG: 2 INJECTION INTRAMUSCULAR; INTRAVENOUS at 01:17

## 2023-03-12 RX ADMIN — SENNOSIDES AND DOCUSATE SODIUM 2 TABLET: 50; 8.6 TABLET ORAL at 20:39

## 2023-03-12 RX ADMIN — ACETAMINOPHEN 975 MG: 325 TABLET ORAL at 09:02

## 2023-03-12 RX ADMIN — SODIUM CHLORIDE 75 ML/HR: 9 INJECTION, SOLUTION INTRAVENOUS at 04:55

## 2023-03-12 RX ADMIN — HYDROMORPHONE HYDROCHLORIDE 0.2 MG: 0.2 INJECTION, SOLUTION INTRAMUSCULAR; INTRAVENOUS; SUBCUTANEOUS at 17:41

## 2023-03-12 RX ADMIN — CEFAZOLIN 1 G: 1 INJECTION, POWDER, FOR SOLUTION INTRAMUSCULAR; INTRAVENOUS at 16:37

## 2023-03-12 RX ADMIN — Medication 5 MG: at 01:31

## 2023-03-12 RX ADMIN — FENTANYL CITRATE 50 MCG: 50 INJECTION, SOLUTION INTRAMUSCULAR; INTRAVENOUS at 03:25

## 2023-03-12 RX ADMIN — HYDROMORPHONE HYDROCHLORIDE 0.5 MG: 1 INJECTION, SOLUTION INTRAMUSCULAR; INTRAVENOUS; SUBCUTANEOUS at 00:07

## 2023-03-12 RX ADMIN — LEVOTHYROXINE SODIUM 100 MCG: 100 TABLET ORAL at 09:03

## 2023-03-12 ASSESSMENT — ACTIVITIES OF DAILY LIVING (ADL)
ADLS_ACUITY_SCORE: 27
DEPENDENT_IADLS:: INDEPENDENT
ADLS_ACUITY_SCORE: 28

## 2023-03-12 NOTE — PROGRESS NOTES
03/12/23 1130   Appointment Info   Signing Clinician's Name / Credentials (OT) Carol PARKER amya Morenovaleriy MEEHAN/L   Living Environment   People in Home spouse   Current Living Arrangements house   Home Accessibility stairs to enter home;stairs within home   Number of Stairs, Main Entrance 2   Number of Stairs, Within Home, Primary ten   Transportation Anticipated family or friend will provide   Self-Care   Current Activity Tolerance poor   Equipment Currently Used at Home walker, standard   Fall history within last six months yes   Number of times patient has fallen within last six months 0   Activity/Exercise/Self-Care Comment Independetnw tih ADLs at baseline   General Information   Onset of Illness/Injury or Date of Surgery 03/11/23   Referring Physician Needenhauser   Existing Precautions/Restrictions 90 degree hip flexion;no hip ADD past midline   Left Lower Extremity (Weight-bearing Status) weight-bearing as tolerated (WBAT)   Cognitive Status Examination   Orientation Status orientation to person, place and time   Cognitive Status Comments WFL   Visual Perception   Visual Impairment/Limitations WNL   Sensory   Sensory Quick Adds sensation intact   Range of Motion Comprehensive   General Range of Motion no range of motion deficits identified   Strength Comprehensive (MMT)   General Manual Muscle Testing (MMT) Assessment no strength deficits identified   Coordination   Upper Extremity Coordination No deficits were identified   Bed Mobility   Comment (Bed Mobility) Mod A of 2   Transfers   Transfer Comments Min A of 2   Balance   Balance Comments SBA sitting balance   Clinical Impression   Criteria for Skilled Therapeutic Interventions Met (OT) Yes, treatment indicated   OT Diagnosis impaired ADL independence   OT Problem List-Impairments impacting ADL activity tolerance impaired;balance;pain;mobility   Assessment of Occupational Performance 3-5 Performance Deficits   Planned Therapy Interventions (OT) ADL  retraining;balance training;bed mobility training;transfer training   Clinical Decision Making Complexity (OT) low complexity   Risk & Benefits of therapy have been explained evaluation/treatment results reviewed;participants included;patient   Clinical Impression Comments Pt seen bedside for OT eval and treatment.  Pt demonstrates decreased independence with ADLs and mobiltiy.  Pt very limited by dizzinees, pain, and nausea.  OT to continue to address.  Recommend TCU   OT Total Evaluation Time   OT Eval, Low Complexity Minutes (93166) 10   OT Goals   Therapy Frequency (OT) Daily   OT Predicted Duration/Target Date for Goal Attainment 03/19/23   OT Goals Lower Body Dressing;Transfers;Toilet Transfer/Toileting   OT: Lower Body Dressing Minimal assist;within precautions;using adaptive equipment   OT: Transfer Modified independent;with assistive device   OT: Toilet Transfer/Toileting Minimal assist   Therapeutic Procedures/Exercise   Therapeutic Procedure: strength, endurance, ROM, flexibillity minutes (60526) 8   Treatment Detail/Skilled Intervention Pt completed B UE ROM while seated EOB 5 sets x 10 reps with rest breaks.   OT Discharge Planning   OT Plan bed mobiltiy, close transfer, LB dressing with AE   OT Discharge Recommendation (DC Rec) Transitional Care Facility   OT Rationale for DC Rec Recommend TCU as pt is below baseline for ADLs and mobiltiy   OT Brief overview of current status Min A of 2 sit to stand

## 2023-03-12 NOTE — BRIEF OP NOTE
[facility]    Brief Operative Note    Pre-operative diagnosis:  1. Left displaced intracapsular femoral neck fracture    Post-operative diagnosis:  1. Left displaced intracapsular femoral neck fracture    Procedure:  1. Left hip cemented bipolar hemiarthroplasty     Surgeon: Khadar Kennedy MD     Assistant: Cyndi Riley PA-C    Anesthesia: General with Block     Estimated Blood Loss: 250 mL     Drains: None    Specimens: None    Findings:   Displaced intracapsular femoral neck fracture. Grade 1 and scattered 2 chondromalacia. No evident full thickness areas of cartilage loss on femoral or acetabular side. Hip stable to 60 internal rotation in position of sleep, 70 degrees with internal rotation at 100 degrees of flexion after final implants placed.     Complications: None apparent     Implants:   Implant Name Type Inv. Item Serial No.  Lot No. LRB No. Used Action   BONE CEMENT SIMPLEX FULL DOSE 6191-1-001 - [id number] Cement, Bone BONE CEMENT SIMPLEX FULL DOSE 6191-1-001  [employer] [id number] Left 2 Implanted   BONE CEMENT SIMPLEX FULL DOSE 6191-1-001 - [id number] Cement, Bone BONE CEMENT SIMPLEX FULL DOSE 6191-1-001  [employer] [id number] Left 1 Implanted   PLUG CEMENT DUPREE W/INSERT 18.5 [id number] - [id number] Total Joint Component/Insert PLUG CEMENT DUPREE W/INSERT 18.5 [id number]  [employer] [id number] Left 1 Implanted   STEM FEMORAL [place] HI OFFSET CEMENT SZ3 - [id number] Total Joint Component/Insert STEM FEMORAL [place] HI OFFSET CEMENT SZ3  [employer]- [id number] Left 1 Implanted   IMP STEM CENTRALIZER DEPUY 10.0MM 1376- - [id number] Total Joint Component/Insert IMP STEM CENTRALIZER DEPUY 10.0MM 1376-  [employer]- [id number] Left 1 Implanted   IMP HEAD FEMORAL DEPUY BIPOLAR [id number] - [id number] Total Joint Component/Insert IMP HEAD FEMORAL DEPUY BIPOLAR [id number]  [employer]- [id number] Left 1  Implanted   IMP HEAD FEMORAL DEPUY [place] 28MM +8.5MM 1365- - [id number] Total Joint Component/Insert IMP HEAD FEMORAL DEPUY [place] 28MM +8.5MM 1365-  [employer] [facility]- [id number] Left 1 Implanted       Activity: Up with assist and assistive device until independent.   --Posterior hip precautions  Weight bearing status: WBAT LLE.  Pain management: Transition from IV to PO as tolerated.    Antibiotics: Ancef x 24 hours.  Diet: Begin with clear fluids and progress diet as tolerated.   DVT prophylaxis: lovenox 40 mg daily x 4 weeks and mechanical while in the hospital.  Imaging: to be complete in PACU.  Labs: Hgb POD#1.  Bracing/Splinting: abduction pillow in bed, can replace with pillows on POD#1  Dressings: Keep clean, dry and intact until follow up.   Physical Therapy/Occupational Therapy: Eval and treat, appreciate assistance and recommendations.  Consults: PT/OT, social work for disposition planning.  Follow-up: Clinic in 2 weeks with repeat x-rays needed.   Disposition: Pending progress with therapies, pain control on orals, and medical stability, anticipate discharge to TCU.    Khadar Kennedy MD  Orthopedic Surgery

## 2023-03-12 NOTE — ANESTHESIA CARE TRANSFER NOTE
Patient: Cristiane Birmingham    Procedure: Procedure(s):  HEMIARTHROPLASTY, HIP, BIPOLAR       Diagnosis: Closed fracture of neck of left femur, initial encounter (H) [S72.002A]  Diagnosis Additional Information: No value filed.    Anesthesia Type:   General     Note:    Oropharynx: oropharynx clear of all foreign objects  Level of Consciousness: awake  Oxygen Supplementation: face mask  Level of Supplemental Oxygen (L/min / FiO2): 10  Independent Airway: airway patency satisfactory and stable  Dentition: dentition unchanged  Vital Signs Stable: post-procedure vital signs reviewed and stable  Report to RN Given: handoff report given  Patient transferred to: PACU    Handoff Report: Identifed the Patient, Identified the Reponsible Provider, Reviewed the pertinent medical history, Discussed the surgical course, Reviewed Intra-OP anesthesia mangement and issues during anesthesia, Set expectations for post-procedure period and Allowed opportunity for questions and acknowledgement of understanding      Vitals:  Vitals Value Taken Time   /97 03/12/23 0156   Temp 96.8 F 01:55   Pulse 70 03/12/23 0155   Resp 13 03/12/23 0155   SpO2 100 % 03/12/23 0155   Vitals shown include unvalidated device data.    Electronically Signed By: RONAL Vargas CRNA  March 12, 2023  1:57 AM

## 2023-03-12 NOTE — PROGRESS NOTES
Physical Therapy        03/12/23 1124   Appointment Info   Signing Clinician's Name / Credentials (PT) Gemini Strong DPT   Living Environment   People in Home spouse   Current Living Arrangements house   Home Accessibility stairs to enter home;stairs within home   Number of Stairs, Main Entrance 2  (with sidewalk: 1 step onto porch, 1 step into house with post to hang onto. From garage: 2 steps, no rail)   Number of Stairs, Within Home, Primary greater than 10 stairs   Stair Railings, Within Home, Primary railings on both sides of stairs   Living Environment Comments bedroom is upstairs, couches on main floor   Self-Care   Activity/Exercise/Self-Care Comment independent with all ADLs/IADLs   General Information   Onset of Illness/Injury or Date of Surgery 03/11/23   Referring Physician Cyndi Riley, BILLY   Patient/Family Therapy Goals Statement (PT) play pipe organ   Pertinent History of Current Problem (include personal factors and/or comorbidities that impact the POC) L hip fracture with hemiarthroplasty   Existing Precautions/Restrictions no hip IR;no hip ADD past midline;90 degree hip flexion;no pivoting or twisting   Weight-Bearing Status - LLE weight-bearing as tolerated   Cognition   Affect/Mental Status (Cognition) WFL   Pain Assessment   Patient Currently in Pain Yes, see Vital Sign flowsheet   Integumentary/Edema   Integumentary/Edema no deficits were identifed   Posture    Posture Not impaired   Range of Motion (ROM)   Range of Motion ROM deficits secondary to surgical procedure   Strength (Manual Muscle Testing)   Strength (Manual Muscle Testing) Deficits observed during functional mobility   Bed Mobility   Comment, (Bed Mobility) modAx2 supine to sit   Transfers   Comment, (Transfers) minAx2 sit to stand   Gait/Stairs (Locomotion)   Comment, (Gait/Stairs) unable to take steps due to pain   Balance   Balance Comments inpaired by pain, lightheadedness   Sensory Examination   Sensory Perception  patient reports no sensory changes   Coordination   Coordination no deficits were identified   Muscle Tone   Muscle Tone no deficits were identified   Clinical Impression   Criteria for Skilled Therapeutic Intervention Yes, treatment indicated   PT Diagnosis (PT) gait instability   Influenced by the following impairments pain, weakness 2/2 hip fracture   Functional limitations due to impairments limited household mobility   Clinical Presentation (PT Evaluation Complexity) Stable/Uncomplicated   Clinical Presentation Rationale presents as medically diagnosed   Clinical Decision Making (Complexity) low complexity   Planned Therapy Interventions (PT) balance training;bed mobility training;home exercise program;patient/family education;stair training;strengthening;transfer training   Anticipated Equipment Needs at Discharge (PT) walker, rolling   Risk & Benefits of therapy have been explained evaluation/treatment results reviewed;care plan/treatment goals reviewed;participants voiced agreement with care plan;participants included;patient   PT Total Evaluation Time   PT Eval, Low Complexity Minutes (93271) 15   Plan of Care Review   Plan of Care Reviewed With patient;spouse   Physical Therapy Goals   PT Frequency 2x/day   PT Predicted Duration/Target Date for Goal Attainment 03/20/23   PT Goals Bed Mobility;Transfers;Gait;Stairs   PT: Bed Mobility Supervision/stand-by assist;Supine to/from sit;Within precautions   PT: Transfers Supervision/stand-by assist;Sit to/from stand;Within precautions   PT: Gait Supervision/stand-by assist;100 feet;Rolling walker;Within precautions   PT: Stairs 3 stairs;Supervision/stand-by assist;Rail on both sides   Interventions   Interventions Quick Adds Therapeutic Activity   Therapeutic Activity   Therapeutic Activities: dynamic activities to improve functional performance Minutes (31589) 10   Treatment Detail/Skilled Intervention pt education on hip precautions, bed mobility technique.  modAx2 supine<>sit with cues for sequencing and precautions. minAx2 sit<>stand with cues for hand placement and WB. Reminders to breathe throughout   PT Discharge Planning   PT Plan hip HEP, transfers, initiate GT with FWW   PT Discharge Recommendation (DC Rec) Transitional Care Facility   PT Rationale for DC Rec pt requires assist of 2   PT Brief overview of current status POD1 limited by pain, sit<>stand minAx2   Total Session Time   Timed Code Treatment Minutes 10   Total Session Time (sum of timed and untimed services) 25         Pt was educated on the role of physical therapy in their care, and indicated understanding.      Gemini Strong, DPT 3/12/2023

## 2023-03-12 NOTE — PLAN OF CARE
Problem: Hip Fracture Medical Management  Goal: Optimal Coping with Change in Health Status  Outcome: Progressing     Problem: Pain Acute  Goal: Optimal Pain Control and Function  Outcome: Progressing   Goal Outcome Evaluation:       Patient back from hip surgery at around 0400, denied pain, wedge between legs and purwik in place with no output as of 0700. Reported to on coming nurse that straight cath was needed after surgery. VSS and NS infusing at 100 ml's/hr.

## 2023-03-12 NOTE — ANESTHESIA PREPROCEDURE EVALUATION
Anesthesia Pre-Procedure Evaluation    Patient: Cristiane Birmingham   MRN: [id number] : 1940        Procedure : Procedure(s):  HEMIARTHROPLASTY, HIP, BIPOLAR          Past Medical History:   Diagnosis Date     Benign essential hypertension 2020     Breast cyst 2014     Colon polyp      Degenerative arthritis 2020     Diverticulitis of colon 2020     Dyspnea on exertion 2020     Gastroesophageal reflux disease 2020     Hyperlipidemia 2020     Hypothyroidism 2020     Inverted nipple     hx of inverted nipples     Palpitations 2020     PVC's (premature ventricular contractions) 2020     Senile incipient cataract 2020     Varicose veins of both lower extremities 2020      Past Surgical History:   Procedure Laterality Date     BIOPSY BREAST Left 2009     BIOPSY BREAST Right 2014    benign     BREAST CYST ASPIRATION Right 2014     STRIP VEIN       TUBAL LIGATION        Allergies   Allergen Reactions     Fosamax [Alendronic Acid] Unknown     Metronidazole Unknown     Sulfa (Sulfonamide Antibiotics) [Sulfa Drugs] Unknown      Social History     Tobacco Use     Smoking status: Former     Smokeless tobacco: Never     Tobacco comments:     briefly while in college   Substance Use Topics     Alcohol use: Not on file      Wt Readings from Last 1 Encounters:   23 69.2 kg (152 lb 8.9 oz)        Anesthesia Evaluation   Pt has had prior anesthetic.     No history of anesthetic complications       ROS/MED HX  ENT/Pulmonary:  - neg pulmonary ROS     Neurologic:  - neg neurologic ROS     Cardiovascular:     (+) Dyslipidemia hypertension-----Irregular Heartbeat/Palpitations (intermittent PVCs),  (-) ANTONIO   METS/Exercise Tolerance:     Hematologic:       Musculoskeletal:       GI/Hepatic:     (+) GERD,     Renal/Genitourinary:  - neg Renal ROS     Endo:     (+) thyroid problem,     Psychiatric/Substance Use:       Infectious Disease:       Malignancy:       Other:             Physical Exam    Airway        Mallampati: II   TM distance: > 3 FB   Neck ROM: full     Respiratory Devices and Support         Dental       (+) Minor Abnormalities - some fillings, tiny chips      Cardiovascular          Rhythm and rate: regular and normal     Pulmonary           breath sounds clear to auscultation           OUTSIDE LABS:  CBC:   Lab Results   Component Value Date    WBC 13.4 (H) 03/11/2023    WBC 5.9 05/03/2021    HGB 14.9 03/11/2023    HGB 14.6 05/03/2021    HCT 43.6 03/11/2023    HCT 44.0 05/03/2021     03/11/2023     05/03/2021     BMP:   Lab Results   Component Value Date     03/11/2023     10/12/2022    POTASSIUM 3.7 03/11/2023    POTASSIUM 4.3 10/12/2022    CHLORIDE 104 03/11/2023    CHLORIDE 103 10/12/2022    CO2 23 03/11/2023    CO2 24 10/12/2022    BUN 13.1 03/11/2023    BUN 24.2 (H) 10/12/2022    CR 0.57 03/11/2023    CR 0.73 10/12/2022     (H) 03/11/2023     (H) 10/12/2022     COAGS:   Lab Results   Component Value Date    INR 0.94 03/11/2023     POC: No results found for: BGM, HCG, HCGS  HEPATIC:   Lab Results   Component Value Date    ALBUMIN 4.0 10/12/2022    PROTTOTAL 6.1 (L) 10/12/2022    ALT 19 10/12/2022    AST 14 10/12/2022    ALKPHOS 92 10/12/2022    BILITOTAL 0.3 10/12/2022     OTHER:   Lab Results   Component Value Date    ANTHONY 9.2 03/11/2023    TSH 1.72 10/12/2022       Anesthesia Plan    ASA Status:  3      Anesthesia Type: General.     - Airway: ETT              Consents    Anesthesia Plan(s) and associated risks, benefits, and realistic alternatives discussed. Questions answered and patient/representative(s) expressed understanding.     - Discussed: Risks, Benefits and Alternatives for BOTH SEDATION and the PROCEDURE were discussed     - Discussed with:  Patient         Postoperative Care    Pain management: Peripheral nerve block (Single Shot).        Comments:    Other Comments: Fascia iliaca PNB  Patient would prefer a  general anesthetic            Alla Wynne MD

## 2023-03-12 NOTE — PLAN OF CARE
Problem: Pain Acute  Goal: Optimal Pain Control and Function  Outcome: Progressing  Intervention: Develop Pain Management Plan  Recent Flowsheet Documentation  Taken 3/11/2023 1838 by Joann Connell, RN  Pain Management Interventions: medication (see MAR)  Taken 3/11/2023 1721 by Joann Connell, RN  Pain Management Interventions: medication (see MAR)     Problem: Hip Fracture Medical Management  Goal: Effective Urinary Elimination  Outcome: Progressing     Goal Outcome Evaluation:       Pt presents to the hospital today after experiencing a fall on the ice with subsequent left hip pain. Imaging positive for fracture of the left femoral neck. Ortho consulting.  Surgery scheduled for 2145. Left hip is slightly swollen. Pt is experiencing significant pain. Received prn IV Dilaudid 0.2 mg at 1720, which was briefly effective. MD updated regarding poor pain management and order received for prn Oxycodone 5 mg, which was administered at 1835. Medication was effective per pt report. Pt is A&O x4. She is an active 82 year old woman who lives with her . Remains NPO. IV fluids running at 75 ml/hr of NS. Pure wick device in place.

## 2023-03-12 NOTE — CONSULTS
Care Management Initial Consult    General Information  Assessment completed with: Spouse or significant otherPer  Type of CM/SW Visit: Initial Assessment    Primary Care Provider verified and updated as needed: Yes   Readmission within the last 30 days: no previous admission in last 30 days         Advance Care Planning:            Communication Assessment  Patient's communication style: spoken language (English or Bilingual)    Hearing Difficulty or Deaf: yes   Wear Glasses or Blind: no    Cognitive  Cognitive/Neuro/Behavioral: WDL  Level of Consciousness: alert  Arousal Level: opens eyes spontaneously  Orientation: oriented x 4  Mood/Behavior: calm, cooperative  Best Language: 0 - No aphasia  Speech: clear, spontaneous, logical    Living Environment:   People in home: spouse  Per  Current living Arrangements: house      Able to return to prior arrangements: yes (Per therapy recs)       Family/Social Support:  Care provided by: self  Provides care for: no one  Marital Status:     Per       Description of Support System: Supportive         Current Resources:   Patient receiving home care services: No     Community Resources: None  Equipment currently used at home: walker, standard (does not use)  Supplies currently used at home:      Employment/Financial:  Employment Status: employed part-time        Financial Concerns:             Lifestyle & Psychosocial Needs:  Social Determinants of Health     Tobacco Use: Medium Risk     Smoking Tobacco Use: Former     Smokeless Tobacco Use: Never     Passive Exposure: Not on file   Alcohol Use: Not on file   Financial Resource Strain: Not on file   Food Insecurity: Not on file   Transportation Needs: Not on file   Physical Activity: Not on file   Stress: Not on file   Social Connections: Not on file   Intimate Partner Violence: Not on file   Depression: Not on file   Housing Stability: Not on file       Functional Status:  Prior to admission patient needed  assistance:   Dependent ADLs:: Independent  Dependent IADLs:: Independent       Mental Health Status:          Chemical Dependency Status:                Values/Beliefs:  Spiritual, Cultural Beliefs, Scientologist Practices, Values that affect care:    Description of Beliefs that Will Affect Care: keith            Additional Information:  Assessed. Spoke with  Per. Pt lives with  in a split level home, with two stairs to get in.  stated pt could stay on 1st level if needed.  Ind at basline. Pt still drives. 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 No svcs prior. Pt had ankle fx last year, so pt has walker from that, does not use now. Therapy recs TCU, which  wants to look into. RNCM provided pt with list of TCUs.  to transport if safe to do so.      CM will continue to follow plan of care, review recommendations,and assist with any discharge needs anticipated.     Mireille Mendez RN

## 2023-03-12 NOTE — PROGRESS NOTES
"Orthopedic Progress Note      Assessment: 1 Day Post-Op  S/P Procedure(s):  HEMIARTHROPLASTY, HIP, BIPOLAR     Plan:   - Begin working with PT/OT  - Weightbearing status: WBAT  - Anticoagulation: lovenox daily in addition to SCDs, mar stockings and early ambulation.  - Discharge planning: likely TCU discharge depending on progress with therapies.       Subjective:  Pain: mild  Nausea, Vomiting:  No  Lightheadedness, Dizziness:  No  Neuro:  Patient denies new onset numbness or paresthesias    Patient is doing well this morning and has no concerns.     Objective:  /62 (BP Location: Right arm)   Pulse 78   Temp 98  F (36.7  C) (Oral)   Resp 16   Ht 1.651 m (5' 5\")   Wt 71 kg (156 lb 8.4 oz)   SpO2 95%   BMI 26.05 kg/m    The patient is A&Ox3. Appears comfortable.   Sensation is intact.  Dorsiflexion and plantar flexion is intact.  Dorsalis pedis pulse intact.  Calves are soft and non-tender. Negative Kelly's.  The incision is covered. Dressing C/D/I.      Pertinent Labs   Lab Results: personally reviewed.   Lab Results   Component Value Date    INR 0.94 03/11/2023     Lab Results   Component Value Date    WBC 13.4 (H) 03/11/2023    HGB 14.9 03/11/2023    HCT 43.6 03/11/2023    MCV 93 03/11/2023     03/11/2023     Lab Results   Component Value Date     03/11/2023    CO2 23 03/11/2023         Report completed by:  Higinio Red PA-C, BILLY  Date: 3/12/2023  Time: 10:26 AM    "

## 2023-03-12 NOTE — ANESTHESIA PROCEDURE NOTES
Airway         Procedure Start/Stop Times: 3/11/2023 11:05 PM  Staff -        CRNA: Zoltan Patterson APRN CRNA       Performed By: CRNA  Consent for Airway        Urgency: elective  Indications and Patient Condition       Indications for airway management: burt-procedural         Mask difficulty assessment: 0 - not attempted    Final Airway Details       Final airway type: endotracheal airway       Successful airway: ETT - single  Endotracheal Airway Details        ETT size (mm): 7.5       Cuffed: yes       Successful intubation technique: direct laryngoscopy       DL Blade Type: Hidalgo 3       Grade View of Cords: 2       Adjucts: stylet       Position: Right       Measured from: lips       Secured at (cm): 22    Post intubation assessment        Placement verified by: capnometry and equal breath sounds        Number of attempts at approach: 1       Secured with: silk tape       Ease of procedure: easy       Dentition: Unchanged       Dental guard used and removed. Dental Guard Type: Proguard Red.    Medication(s) Administered   Medication Administration Time: 3/11/2023 11:05 PM

## 2023-03-12 NOTE — PLAN OF CARE
Problem: Hip Fracture Medical Management  Goal: Optimal Pain Control and Function  Intervention: Manage Acute Orthopaedic-Related Pain  Recent Flowsheet Documentation  Taken 3/12/2023 1202 by Jomar Banks RN  Pain Management Interventions: medication (see MAR)  Taken 3/12/2023 0900 by Jomar Banks RN  Pain Management Interventions:   medication (see MAR)   cold applied     Problem: Hip Fracture Medical Management  Goal: Baseline Cognitive Function Maintained  Intervention: Maximize Cognitive Function  Recent Flowsheet Documentation  Taken 3/12/2023 0900 by Jomar Banks RN  Reorientation Measures:   calendar in view   clock in view   hearing device use encouraged     Patient is alert and orientated, able to make needs known. CMS to LLE is intact. Dressing is clean, dry and intact. Encouraged oral intact. Continue IVF at 100 ml/hr. Worked with physical therapy, experienced 9/10 hip during and after working with PT. Administered PRN hydromorphone 0.4mg, and PO oxycodone 1 hour later. Report that she is feeling less pain. Minimal urine output, bladder scanned for 264 ml. Will continue to encourage fluids.   Able to titrate patient to RA.     Jomar Banks RN

## 2023-03-12 NOTE — ANESTHESIA POSTPROCEDURE EVALUATION
Patient: Cristiane Birmingham    Procedure: Procedure(s):  HEMIARTHROPLASTY, HIP, BIPOLAR       Anesthesia Type:  General    Note:  Disposition: Inpatient   Postop Pain Control: Uneventful            Sign Out: Well controlled pain   PONV: No   Neuro/Psych: Uneventful            Sign Out: Acceptable/Baseline neuro status   Airway/Respiratory: Uneventful            Sign Out: Acceptable/Baseline resp. status   CV/Hemodynamics: Uneventful            Sign Out: Acceptable CV status; No obvious hypovolemia; No obvious fluid overload   Other NRE:    DID A NON-ROUTINE EVENT OCCUR?            Last vitals:  Vitals Value Taken Time   /67 03/12/23 0400   Temp 36.8  C (98.2  F) 03/12/23 0158   Pulse 73 03/12/23 0402   Resp 15 03/12/23 0402   SpO2 96 % 03/12/23 0402   Vitals shown include unvalidated device data.    Electronically Signed By: Alla Wynne MD  March 12, 2023  4:37 AM

## 2023-03-13 ENCOUNTER — APPOINTMENT (OUTPATIENT)
Dept: PHYSICAL THERAPY | Facility: HOSPITAL | Age: 83
DRG: 522 | End: 2023-03-13
Payer: COMMERCIAL

## 2023-03-13 ENCOUNTER — APPOINTMENT (OUTPATIENT)
Dept: RADIOLOGY | Facility: HOSPITAL | Age: 83
DRG: 522 | End: 2023-03-13
Attending: PHYSICIAN ASSISTANT
Payer: COMMERCIAL

## 2023-03-13 ENCOUNTER — APPOINTMENT (OUTPATIENT)
Dept: OCCUPATIONAL THERAPY | Facility: HOSPITAL | Age: 83
DRG: 522 | End: 2023-03-13
Payer: COMMERCIAL

## 2023-03-13 LAB
GLUCOSE SERPL-MCNC: 127 MG/DL (ref 70–99)
HGB BLD-MCNC: 11.3 G/DL (ref 11.7–15.7)

## 2023-03-13 PROCEDURE — 82947 ASSAY GLUCOSE BLOOD QUANT: CPT

## 2023-03-13 PROCEDURE — 99232 SBSQ HOSP IP/OBS MODERATE 35: CPT | Performed by: INTERNAL MEDICINE

## 2023-03-13 PROCEDURE — 73502 X-RAY EXAM HIP UNI 2-3 VIEWS: CPT

## 2023-03-13 PROCEDURE — 120N000001 HC R&B MED SURG/OB

## 2023-03-13 PROCEDURE — 97535 SELF CARE MNGMENT TRAINING: CPT | Mod: GO

## 2023-03-13 PROCEDURE — 250N000013 HC RX MED GY IP 250 OP 250 PS 637: Performed by: INTERNAL MEDICINE

## 2023-03-13 PROCEDURE — 250N000011 HC RX IP 250 OP 636

## 2023-03-13 PROCEDURE — 97116 GAIT TRAINING THERAPY: CPT | Mod: GP

## 2023-03-13 PROCEDURE — 250N000013 HC RX MED GY IP 250 OP 250 PS 637: Performed by: PHYSICIAN ASSISTANT

## 2023-03-13 PROCEDURE — 36415 COLL VENOUS BLD VENIPUNCTURE: CPT

## 2023-03-13 PROCEDURE — 97530 THERAPEUTIC ACTIVITIES: CPT | Mod: GP

## 2023-03-13 PROCEDURE — 258N000003 HC RX IP 258 OP 636: Performed by: INTERNAL MEDICINE

## 2023-03-13 PROCEDURE — 250N000013 HC RX MED GY IP 250 OP 250 PS 637

## 2023-03-13 PROCEDURE — 85018 HEMOGLOBIN: CPT

## 2023-03-13 RX ORDER — ACETAMINOPHEN 325 MG/1
975 TABLET ORAL EVERY 6 HOURS
Status: DISCONTINUED | OUTPATIENT
Start: 2023-03-13 | End: 2023-03-14 | Stop reason: HOSPADM

## 2023-03-13 RX ORDER — HYDROXYZINE HYDROCHLORIDE 25 MG/1
25 TABLET, FILM COATED ORAL EVERY 6 HOURS PRN
Status: DISCONTINUED | OUTPATIENT
Start: 2023-03-13 | End: 2023-03-14

## 2023-03-13 RX ORDER — AMLODIPINE BESYLATE 5 MG/1
5 TABLET ORAL DAILY
Status: DISCONTINUED | OUTPATIENT
Start: 2023-03-13 | End: 2023-03-14

## 2023-03-13 RX ORDER — HYDROXYZINE HYDROCHLORIDE 25 MG/1
50 TABLET, FILM COATED ORAL EVERY 6 HOURS PRN
Status: DISCONTINUED | OUTPATIENT
Start: 2023-03-13 | End: 2023-03-14

## 2023-03-13 RX ORDER — HYDROMORPHONE HYDROCHLORIDE 2 MG/1
2-4 TABLET ORAL EVERY 4 HOURS PRN
Status: DISCONTINUED | OUTPATIENT
Start: 2023-03-13 | End: 2023-03-14 | Stop reason: HOSPADM

## 2023-03-13 RX ORDER — METHOCARBAMOL 500 MG/1
500 TABLET, FILM COATED ORAL 3 TIMES DAILY
Status: DISCONTINUED | OUTPATIENT
Start: 2023-03-13 | End: 2023-03-14 | Stop reason: HOSPADM

## 2023-03-13 RX ADMIN — LEVOTHYROXINE SODIUM 100 MCG: 100 TABLET ORAL at 09:25

## 2023-03-13 RX ADMIN — PANTOPRAZOLE SODIUM 40 MG: 40 TABLET, DELAYED RELEASE ORAL at 09:37

## 2023-03-13 RX ADMIN — ACETAMINOPHEN 975 MG: 325 TABLET ORAL at 15:50

## 2023-03-13 RX ADMIN — HYDROMORPHONE HYDROCHLORIDE 0.4 MG: 0.2 INJECTION, SOLUTION INTRAMUSCULAR; INTRAVENOUS; SUBCUTANEOUS at 06:55

## 2023-03-13 RX ADMIN — ACETAMINOPHEN 975 MG: 325 TABLET ORAL at 21:00

## 2023-03-13 RX ADMIN — ATORVASTATIN CALCIUM 5 MG: 10 TABLET, FILM COATED ORAL at 21:00

## 2023-03-13 RX ADMIN — SENNOSIDES AND DOCUSATE SODIUM 1 TABLET: 50; 8.6 TABLET ORAL at 09:25

## 2023-03-13 RX ADMIN — ACETAMINOPHEN 975 MG: 325 TABLET ORAL at 00:52

## 2023-03-13 RX ADMIN — HYDROMORPHONE HYDROCHLORIDE 0.4 MG: 0.2 INJECTION, SOLUTION INTRAMUSCULAR; INTRAVENOUS; SUBCUTANEOUS at 00:52

## 2023-03-13 RX ADMIN — ENOXAPARIN SODIUM 40 MG: 40 INJECTION SUBCUTANEOUS at 06:41

## 2023-03-13 RX ADMIN — ACETAMINOPHEN 975 MG: 325 TABLET ORAL at 09:23

## 2023-03-13 RX ADMIN — HYDROMORPHONE HYDROCHLORIDE 0.2 MG: 0.2 INJECTION, SOLUTION INTRAMUSCULAR; INTRAVENOUS; SUBCUTANEOUS at 09:38

## 2023-03-13 RX ADMIN — SENNOSIDES AND DOCUSATE SODIUM 2 TABLET: 50; 8.6 TABLET ORAL at 20:26

## 2023-03-13 RX ADMIN — SODIUM CHLORIDE: 9 INJECTION, SOLUTION INTRAVENOUS at 13:31

## 2023-03-13 RX ADMIN — HYDROMORPHONE HYDROCHLORIDE 0.2 MG: 0.2 INJECTION, SOLUTION INTRAMUSCULAR; INTRAVENOUS; SUBCUTANEOUS at 20:27

## 2023-03-13 RX ADMIN — HYDROMORPHONE HYDROCHLORIDE 0.2 MG: 0.2 INJECTION, SOLUTION INTRAMUSCULAR; INTRAVENOUS; SUBCUTANEOUS at 15:51

## 2023-03-13 RX ADMIN — POLYETHYLENE GLYCOL 3350 17 G: 17 POWDER, FOR SOLUTION ORAL at 09:27

## 2023-03-13 RX ADMIN — AMLODIPINE BESYLATE 5 MG: 5 TABLET ORAL at 09:38

## 2023-03-13 RX ADMIN — HYDROXYZINE HYDROCHLORIDE 25 MG: 25 TABLET ORAL at 12:13

## 2023-03-13 RX ADMIN — METHOCARBAMOL 500 MG: 500 TABLET, FILM COATED ORAL at 20:27

## 2023-03-13 ASSESSMENT — ACTIVITIES OF DAILY LIVING (ADL)
ADLS_ACUITY_SCORE: 29
ADLS_ACUITY_SCORE: 31
ADLS_ACUITY_SCORE: 29
ADLS_ACUITY_SCORE: 27
ADLS_ACUITY_SCORE: 29
ADLS_ACUITY_SCORE: 31
ADLS_ACUITY_SCORE: 27
ADLS_ACUITY_SCORE: 29
ADLS_ACUITY_SCORE: 31

## 2023-03-13 NOTE — PROGRESS NOTES
"[facility]    Medicine Progress Note - Hospitalist Service    Date of Admission:  3/11/2023    Assessment & Plan       Cristiane Birmingham is a 82 year old female admitted on 3/11/2023. She has been admitted post fall. Patient [place] today this am when she slipped on ice and fell on the ground following which she had severe left hip pain. Patient subsequently came to the ER for further evaluation and management. She was found to have Fracture through the left femoral neck without evidence for intertrochanteric extension. Will keep NPO, on iv fluids, plan to go to OR, ortho consulted, pain control. Patient is medically cleared for surgery.          3/13:     Post op day 1  Ortho following  Continues to have significant pain issues, pain control, consult pain management    Discharge to tcu once ok with ortho     A/p :           S/p fall, mechanical    Fracture through the left femoral neck without evidence for intertrochanteric extension.     Will keep NPO, on iv fluids, plan to go to OR, ortho consulted, pain control. Patient is medically cleared for surgery.          Leucocytosis : ? reactive       HLD : on statin       Hypothyroidism : on levothyroxine 100 mcg daily       GERD :on ppi       Elevated BP : on iv hydralazine prn               Diet: Advance Diet as Tolerated: Regular Diet Adult    DVT Prophylaxis: Pneumatic Compression Devices  Yuen Catheter: Not present  Lines: None     Cardiac Monitoring: None  Code Status: Full Code      Clinically Significant Risk Factors                        # Overweight: Estimated body mass index is 26.05 kg/m  as calculated from the following:    Height as of this encounter: 1.651 m (5' 5\").    Weight as of this encounter: 71 kg (156 lb 8.4 oz)., PRESENT ON ADMISSION         Disposition Plan     Expected Discharge Date: 03/14/2023    Discharge Delays: Placement - TCU    Discharge Comments: POD 1 hip surgery          Julio César Sellers, " MD  Hospitalist Service  [employer]  Securely message with [employer] (more info)  Text page via [employer] Paging/Directory   ______________________________________________________________________        Physical Exam   Vital Signs: Temp: 98  F (36.7  C) Temp src: Oral BP: (!) 158/82 Pulse: 95   Resp: 20 SpO2: 93 % O2 Device: Nasal cannula Oxygen Delivery: 2 LPM  Weight: 156 lbs 8.43 oz       GENERAL: The patient is not in any acute distressed. Awake and alert.  HEENT: Nonicteric sclerae, PERRLA, EOMI. Oropharynx clear. Moist mucous membranes. Conjunctivae appear well perfused.  HEART: Regular rate and rhythm without murmurs.  LUNGS: Clear to auscultation bilaterally. No wheezing or crackles.  ABDOMEN: Soft, positive bowel sounds, nontender.  SKIN: No rash, no excessive bruising, petechiae, or purpura.  EXTREMITIES : no rashes, no swelling in legs.  NEUROLOGIC: conscious and oriented, follows commands, no obvious focal deficits.  ROS: All other systems negative       Medical Decision Making       50 MINUTES SPENT BY ME on the date of service doing chart review, history, exam, documentation & further activities per the note.  MANAGEMENT DISCUSSED with the following over the past 24 hours: rn, patient       Data     I have personally reviewed the following data over the past 24 hrs:    N/A  \   11.3 (L)   / N/A     N/A N/A N/A /  127 (H)   N/A N/A N/A \

## 2023-03-13 NOTE — OP NOTE
PATIENT: Cristiane Birmingham  MR# :   [id number]  DATE OF OPERATION: 03/13/23    SURGEON:  Khadar Kennedy MD     ASSISTANT:  Cyndi Riley PA-C     A skilled assistant was required due to the nature of the case for patient position, retraction, exposure, implant placement, closure and dressing application.    Preoperative diagnosis: Left hip displaced intracapsular femoral neck fracture    Postoperative diagnosis:  Left hip displaced intracapsular femoral neck fracture    Surgical procedure: Left hp cemented bipolar hemiarthroplasty - posterior approach    Anesthesia:  Fascia Iliaca regional block + GETA    Drains: None    Estimated blood loss: 250 cc    IV fluids: Please see Anesthesia Report    Complications: None identified intraoperatively     Specimens: * No specimens in log *    Findings: Displaced intracapsular femoral neck fracture. Grade 1 and scattered 2 chondromalacia. No evident full thickness areas of cartilage loss on femoral or acetabular side. Hip stable to 60 internal rotation in position of sleep, 70 degrees with internal rotation at 100 degrees of flexion after final implants placed.     COMPONENTS IMPLANTED:  Implant Name Type Inv. Item Serial No.  Lot No. LRB No. Used Action   BONE CEMENT SIMPLEX FULL DOSE 6191-1-001 - [id number] Cement, Bone BONE CEMENT SIMPLEX FULL DOSE 6191-1-001  [employer] [id number] Left 2 Implanted   BONE CEMENT SIMPLEX FULL DOSE 6191-1-001 - [id number] Cement, Bone BONE CEMENT SIMPLEX FULL DOSE 6191-1-001  [employer] [id number] Left 1 Implanted   PLUG CEMENT DUPREE W/INSERT 18.5 [id number] - [id number] Total Joint Component/Insert PLUG CEMENT DUPREE W/INSERT 18.5 [id number]  [employer] [id number] Left 1 Implanted   STEM FEMORAL [place] HI OFFSET CEMENT SZ3 - [id number] Total Joint Component/Insert STEM FEMORAL SUMMIT HI OFFSET CEMENT SZ3  [employer]&for; to (do) [facility]- [id number] Left 1 Implanted   IMP STEM CENTRALIZER DEPUY 10.0MM 1376- -  [id number] Total Joint Component/Insert IMP STEM CENTRALIZER DEPUY 10.0MM 1376-  [employer] [facility]- [id number] Left 1 Implanted   IMP HEAD FEMORAL DEPUY BIPOLAR [id number] - [id number] Total Joint Component/Insert IMP HEAD FEMORAL DEPUY BIPOLAR [id number]  J[employer] [facility]- [id number] Left 1 Implanted   IMP HEAD FEMORAL DEPUY [place] 28MM +8.5MM 1365- - [id number] Total Joint Component/Insert IMP HEAD FEMORAL DEPUY [place] 28MM +8.5MM 1365-  &salgomed [facility]- [id number] Left 1 Implanted       INDICATIONS:  Cristiane Birmingham is a 82 year old female who sustained a slip and fall on ice earlier today landing directly onto her left hip.  She was brought to the emergency department where x-rays demonstrated a left displaced femoral neck fracture.  She was admitted to the medicine team where she was evaluated preoperatively and deemed to be medically optimized for surgery today.    Preoperatively, the nature of the procedure, risks and benefits, as well as alternatives including nonsurgical management were discussed in detail with the patient. I reviewed and discussed the patient's condition and relevant images with the patient. We discussed options for further evaluation and treatment, including conservative non-operative management versus surgical intervention.       From a conservative standpoint, the patient can pursue non-operative management, which would include protected weight bearing to the lower extremity, which carries a high risk of morbidity and mortality due to prolonged and diminished mobilization/ambulation and it's associated complications, which include but are not limited to blood clots (DVT/PE), skin ulcerations and pressure sores, and pneumonia. In the long term, there is also the risk of chronic pain, fracture nonunion, fracture malunion, and avascular necrosis of the femoral head.     From a surgical standpoint, we discussed closed reduction vs open reduction and  internal fixation. Given, the patient's fracture morphology and degree of displacement, internal fixation would also carry the risks of chronic pain, fracture nonunion, fracture malunion, and avascular necrosis of the femoral head. However, this procedure would likely be quicker, requiring less time under anesthesia and blood loss when compared to hip arthroplasty.     We also discussed what left hip hemiarthroplasty involves, as well as the postoperative recovery and rehabilitation.  Given the above findings which include the patient s age and activity level, I have recommended a ana hip arthroplasty. This procedure has a high likelihood of providing immediate post-operative mobilization. The patient understands the indications and possible complications of surgery.     We also discussed at length the risks and benefits of arthroplasty surgery. Our discussion included but was not limited to the risk of pain, bleeding, infection, blood clots (DVT, PE), wound issues, continued chronic pain in the hip, post-operative joint stiffness, painful arc of motion, difficulty with ambulation, iatrogenic fracture, damage to nearby neurovascular structures, hip instability/dislocation,  implant wear, loosening and/or failure requiring revision, and possible post-operative leg length discrepancy (apparent or actual). The possibility of intra-operative and/or post-operative medical complications such as anesthesia complications or reactions, respiratory and cardiovascular events, stroke, heart attack and/or death were also discussed. In the case of infection of the joint, the patient understands that this will require prolonged IV antibiotic therapy and possible multiple operative procedures in the future.      The patient demonstrated an understanding of these risks as well as the potential benefits of arthroplasty surgery which would include possible improvement in pain, range of motion, and early ambulation. The patient  demonstrated an understanding of the indications of surgery and all possible complications, and together we have decided to proceed with surgery. Specific details of the surgical procedure, hospitalization, recovery, rehabilitation, and long-term precautions were also presented. Following our discussion, she expressed a good understanding and wished to proceed with surgery despite the risks involved.      PROCEDURE:  The patient was met in the preoperative holding area where consent was completed, and after confirming appropriate laterality, the left hip was marked.  She was then brought to the operating room.  She underwent a fascia iliaca block with the anesthesia team.The patient underwent general endotracheal anesthesia without complications and prophylactic IV  antibiotics was confirmed to have been administered within the appropriate timeframe. The patient was placed in the lateral decubitus position with a hip positioner with the operative side up. All bony prominences were well padded.     The surgical site which was properly marked, was then prepped and draped in the typical sterile fashion. A timeout was performed utilizing standard protocol to again identify the correct patient, procedure, and operative site, all operating room staff were in agreement without safety concerns.      A longitudinal incision was then taken down through the skin and subcutaneous tissue for posterior approach to the hip. The tensor fascia carmenza and gluteus hanh were split in line with the wound. Charnley retractors were inserted and the hip was internally rotated. The interval between the gluteus medius and minimus was identified. The piriformis and superior gemellus were identified, tagged, and released. Underlying capsule was then visualized and capsulotomy performed in line with the skin incision. Capsule was then released from the posterior neck as well from the inferior neck to provide improved access. The capsule was  tagged. The femoral neck fracture site was identified.  A freshening femoral neck osteotomy was then made using a sagittal saw, and the head was removed from the acetabulum.      Attention was then turned to the acetabulum. Retractors were placed for exposure. The acetabulum was inspected and the acetabular cartilage was noted to be largely preserved without significant high-grade or full-thickness chondromalacia. At this point, the decision was made to proceed with a hemiarthroplasty.  The femoral head was then sized, and a 49 mm head was felt to be most appropriate with good suction seal and mobility on assessment.     Retractors were placed along the proximal femur to expose the neck cut. The femoral canal was opened with a canal finder. Broaches were sequentially utilized to open the canal. Ultimately, a size  3 broach was found to have a good fit. A high offset neck with a + 8.5, 49 mm ball was applied and the hip was then reduced.      The patient's leg lengths were assessed and determined to be equal. Flexion to 100 degrees and internal rotation to 70 degrees demonstrated no instability. Internal rotation to 60 degrees in the position of sleep noted no instability. Extension was possible to neutral without excessive quadriceps tightness. External rotation demonstrated no compromise, instability, anatomic or prosthetic impingement.      The canal was then prepared for cementation with placement of a cement restrictor, thorough irrigation, and thorough drying. Using a cement gun, the canal was filled and pressurized. The femoral component was inserted into place making sure the stem seated to the same depth and anteversion as the final broach. The stem was carefully held in place while the cement cured and excess cement was removed. A +8.5, 49 mm trial head was again placed and great stability as above was noted. The final dual mobility head was opened, and impacted onto the clean trunnion after it was dried  with a clean sponge.     The hip was relocated and excellent stability was achieved with clinically equal limb lengths.   The wound is copiously irrigated with normal sterile saline. Soft tissues were evaluated in the posterior aspect of the hip, and hemostasis confirmed with electrocautery. 2g of Vancomycin power was placed into the joint and surrounding soft tissues.The capsule was then closed using interrupted #2 Ethibond suture. The piriformis and superior gemellus were repaired to the tip of the trochanter with interrupted #2 Ethibond suture. The fascia was then closed with interrupted #1 Vicryl suture followed by a running 0 Stratafix. Subcutaneous tissue was closed using 0 Vicryl suture and 2-0 Vicryl sutures for the deep dermal layer. The wound was further closed using 3-0 Monocryl suture in a running subcuticular fashion and then surgical glue applied on the skin. Sterile dressing was applied. The patient was then awakened from anesthesia and transferred the PACU in stable condition.     Sponge, needle, and instrument counts were correct at the conclusion of the procedure. The patient has palpable distal pulses in both lower extremity.      Postoperative Plan:  Activity: Up with assist and assistive device until independent.                --Posterior hip precautions  Weight bearing status: WBAT LLE.  Pain management: Transition from IV to PO as tolerated.    Antibiotics: Ancef x 24 hours.  Diet: Begin with clear fluids and progress diet as tolerated.   DVT prophylaxis: lovenox 40 mg daily x 4 weeks and mechanical while in the hospital.  Imaging: to be complete in PACU.  Labs: Hgb POD#1.  Bracing/Splinting: abduction pillow in bed, can replace with pillows on POD#1  Dressings: Keep clean, dry and intact until follow up.   Physical Therapy/Occupational Therapy: Eval and treat, appreciate assistance and recommendations.  Consults: PT/OT, social work for disposition planning.  Follow-up: Clinic in 2 weeks with  repeat x-rays needed.   Disposition: Pending progress with therapies, pain control on orals, and medical stability, anticipate discharge to TCU.    Khadar Kennedy MD   Orthopedic Surgery   [facility]

## 2023-03-13 NOTE — PROGRESS NOTES
Orthopedic Progress Note      Assessment: 2 Days Post-Op  S/P Procedure(s):  HEMIARTHROPLASTY, HIP, BIPOLAR     Plan:   Activity: Up with assist and assistive device until independent. Posterior hip precautions  Weight bearing status: WBAT LLE.  Pain management: Transition from IV to PO as tolerated. Discontinued oxycodone. APAP 975 mg Q6H, dilaudid 2-4 mg Q4H PRN, hydroxyzine 25-50 mg Q6H PRN.   Antibiotics: Ancef x 24 hours.  Diet: Begin with clear fluids and progress diet as tolerated.   DVT prophylaxis: lovenox 40 mg daily x 4 weeks and mechanical while in the hospital.  Imaging: to be complete in PACU.  Labs: Hgb POD#1.  Bracing/Splinting: abduction pillow in bed, can replace with pillows on POD#1  Dressings: Keep clean, dry and intact until follow up.   Physical Therapy/Occupational Therapy: Eval and treat, appreciate assistance and recommendations.  Consults: PT/OT, social work for disposition planning.  Follow-up: Clinic in 2 weeks with repeat x-rays needed.   Disposition: Pending progress with therapies, pain control on orals, and medical stability, anticipate discharge to TCU.  Ordered L hip XR  Discussed with patient the importance of being on top of pain medications. Discussed with patient she has oral dilaudid Q4H PRN for pain however has not received oral pain medications besides APAP today.     Subjective:  Pain: moderate  Nausea, Vomiting:  No  Lightheadedness, Dizziness:  No  Neuro:  Patient denies new onset numbness or paresthesias  Fever, chills: No  Chest pain: No  SOB: No    Patient reports pain is poorly controlled. Patient reports most of the pain at the incision site. Patient reports she has not gotten up much secondary to pain. All questions/concerns answered.    Addendum:  Patient rate pain 3/4 -10. Patient reports she has not work with therapy secondary to pain.     Objective:  BP (!) 169/80 (BP Location: Right arm)   Pulse 96   Temp 98  F (36.7  C) (Oral)   Resp 18   Ht 1.651 m (5'  "5\")   Wt 71 kg (156 lb 8.4 oz)   SpO2 94%   BMI 26.05 kg/m    The patient is Alert and awake. Patient is laying in bed.   Sensation is intact.  Dorsiflexion and plantar flexion is intact.  Dorsalis pedis pulse intact. Skin warm and well perfused.   Compartments are soft and non-tender.   The incision is covered. Dressing C/D/I.        Pertinent Labs   Lab Results: personally reviewed.   Lab Results   Component Value Date    INR 0.94 03/11/2023     Lab Results   Component Value Date    WBC 13.4 (H) 03/11/2023    HGB 11.3 (L) 03/13/2023    HCT 43.6 03/11/2023    MCV 93 03/11/2023     03/11/2023     Lab Results   Component Value Date     03/11/2023    CO2 23 03/11/2023         Report completed by:  Hosea Cordero PA-C, BILLY  Date: 3/13/2023  Time: 9:47 AM  [facility]          "

## 2023-03-13 NOTE — PROGRESS NOTES
SPIRITUAL HEALTH SERVICES Progress Note  [facility]     Saw pt Cristiane Birmingham per admission request      Patient/Family Understanding of Illness and Goals of Care - Yes      Distress and Loss - Concern re: age and fractured hip. Is she destined to be in a nursing home? We discussed that given her active lifestyle she would hopefully respond to physical therapy and slowly heal.      Strengths, Coping, and Resources - matthew, family, seasoned skills of adaptation to life's changes.      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 There are several clergy in her extended family. Depending on God seems to come naturally.      Plan of Care - Spiritual Care is available as needed or requested.    Dana Wilhelm      Office: [phone] (for non-urgent requests)  Please Vocera or page through [facility] for time-sensitive requests

## 2023-03-13 NOTE — PROGRESS NOTES
Care Management Follow Up    Length of Stay (days): 2    Expected Discharge Date: 03/14/2023     Concerns to be Addressed:     Medical progression, discharge planning   Patient plan of care discussed at interdisciplinary rounds: Yes    Anticipated Discharge Disposition:  Transitional care     Anticipated Discharge Services:  TCU   Anticipated Discharge DME:  Per treatment team    Patient/family educated on Medicare website which has current facility and service quality ratings:  yes  Education Provided on the Discharge Plan:  yes  Patient/Family in Agreement with the Plan:  yes    Referrals Placed by CM/SW:  Post acute care facilities   Private pay costs discussed: not applicable at this time     Additional Information:  SW met with Pt to discuss discharge planning and TCU referrals.  Pt requests SW follows up with Pt's spouse.  [employer] called and spoke with Pt's spouse, Per.  Per reports he has not reviewed TCU list yet.  Per is agreeable to SW initiating referrals to facilities near Pt's home.     Referrals sent.     2:48 PM  Pt clinically accepted [facility]  Pt / family in agreement with discharge plan.       NAGA Romero

## 2023-03-13 NOTE — PROGRESS NOTES
"[facility]    Medicine Progress Note - Hospitalist Service    Date of Admission:  3/11/2023    Assessment & Plan       Cristiane Birmingham is a 82 year old female admitted on 3/11/2023. She has been admitted post fall. Patient was walking into the [employer] today this am when she slipped on ice and fell on the ground following which she had severe left hip pain. Patient subsequently came to the ER for further evaluation and management. She was found to have Fracture through the left femoral neck without evidence for intertrochanteric extension. Will keep NPO, on iv fluids, plan to go to OR, ortho consulted, pain control. Patient is medically cleared for surgery.          3/12 :     Post op day 1  Ortho following, pain control, pt/ot    Discharge in 1-2 days     A/p :           S/p fall, mechanical    Fracture through the left femoral neck without evidence for intertrochanteric extension.     Will keep NPO, on iv fluids, plan to go to OR, ortho consulted, pain control. Patient is medically cleared for surgery.          Leucocytosis : ? reactive       HLD : on statin       Hypothyroidism : on levothyroxine 100 mcg daily       GERD :on ppi       Elevated BP : on iv hydralazine prn               Diet: Advance Diet as Tolerated: Regular Diet Adult    DVT Prophylaxis: Pneumatic Compression Devices  Yuen Catheter: Not present  Lines: None     Cardiac Monitoring: None  Code Status: Full Code      Clinically Significant Risk Factors                        # Overweight: Estimated body mass index is 26.05 kg/m  as calculated from the following:    Height as of this encounter: 1.651 m (5' 5\").    Weight as of this encounter: 71 kg (156 lb 8.4 oz)., PRESENT ON ADMISSION         Disposition Plan      Expected Discharge Date: 03/13/2023        Discharge Comments: POD 1 hip surgery          Julio César Sellers MD  Hospitalist Service  [facility]  Securely message with [employer] (more info)  Text " page via [facility] Paging/Directory   ______________________________________________________________________        Physical Exam   Vital Signs: Temp: 98.4  F (36.9  C) Temp src: Oral BP: 138/64 Pulse: 100   Resp: 18 SpO2: 91 % O2 Device: None (Room air) Oxygen Delivery: 2 LPM  Weight: 156 lbs 8.43 oz       GENERAL: The patient is not in any acute distressed. Awake and alert.  HEENT: Nonicteric sclerae, PERRLA, EOMI. Oropharynx clear. Moist mucous membranes. Conjunctivae appear well perfused.  HEART: Regular rate and rhythm without murmurs.  LUNGS: Clear to auscultation bilaterally. No wheezing or crackles.  ABDOMEN: Soft, positive bowel sounds, nontender.  SKIN: No rash, no excessive bruising, petechiae, or purpura.  EXTREMITIES : no rashes, no swelling in legs.  NEUROLOGIC: conscious and oriented, follows commands, no obvious focal deficits.  ROS: All other systems negative       Medical Decision Making       50 MINUTES SPENT BY ME on the date of service doing chart review, history, exam, documentation & further activities per the note.  MANAGEMENT DISCUSSED with the following over the past 24 hours: rn, patient       Data     I have personally reviewed the following data over the past 24 hrs:    N/A  \   12.2   / N/A     N/A N/A N/A /  N/A   N/A N/A 0.67 \

## 2023-03-13 NOTE — PLAN OF CARE
Problem: Hip Fracture Medical Management  Goal: Optimal Coping with Change in Health Status  Outcome: Progressing  Goal: Optimal Pain Control and Function  Outcome: Progressing  Intervention: Manage Acute Orthopaedic-Related Pain  Recent Flowsheet Documentation  Taken 3/12/2023 1800 by Janice Walsh RN  Pain Management Interventions: medication (see MAR)  Goal: Effective Urinary Elimination  Outcome: Progressing   Goal Outcome Evaluation: Pt is alert and oriented X4, pain was managed with oxycodone and hydromorphone with ice pad, proven effective. Pt had pure wick in place and putting up good urine and yellow in color. CMS to LLE intact

## 2023-03-13 NOTE — PLAN OF CARE
Problem: Hip Fracture Medical Management  Goal: Optimal Functional Performance  Intervention: Promote Optimal Functional Status  Recent Flowsheet Documentation  Taken 3/13/2023 0052 by Vera Jacob, RN  Activity Management: patient refuses activity  Goal: Optimal Pain Control and Function  Intervention: Manage Acute Orthopaedic-Related Pain  Recent Flowsheet Documentation  Taken 3/13/2023 0052 by Vera Jacob, RN  Pain Management Interventions:    medication (see MAR)    cold applied   Goal Outcome Evaluation:       Pt rates left hip pain 5/10 at rest, 9/10 with any movement. Scheduled tylenol and prn iv dilaudid given. Pt refuses any repositioning d/t pain. Movement of left leg very minimal and very weak when pt asked to lift leg off bed and do dorsi/plantar flexion/extension. Pt reports this is also d/t pain with any attempt to move. Pt seem moving foot with better rom after pain meds administered, but still unable to lift leg off bed.

## 2023-03-14 ENCOUNTER — APPOINTMENT (OUTPATIENT)
Dept: OCCUPATIONAL THERAPY | Facility: HOSPITAL | Age: 83
DRG: 522 | End: 2023-03-14
Payer: COMMERCIAL

## 2023-03-14 ENCOUNTER — APPOINTMENT (OUTPATIENT)
Dept: PHYSICAL THERAPY | Facility: HOSPITAL | Age: 83
DRG: 522 | End: 2023-03-14
Payer: COMMERCIAL

## 2023-03-14 VITALS
RESPIRATION RATE: 14 BRPM | WEIGHT: 156.53 LBS | SYSTOLIC BLOOD PRESSURE: 144 MMHG | OXYGEN SATURATION: 98 % | TEMPERATURE: 98.1 F | HEIGHT: 65 IN | DIASTOLIC BLOOD PRESSURE: 65 MMHG | HEART RATE: 90 BPM | BODY MASS INDEX: 26.08 KG/M2

## 2023-03-14 LAB
GLUCOSE BLDC GLUCOMTR-MCNC: 119 MG/DL (ref 70–99)
HGB BLD-MCNC: 11.5 G/DL (ref 11.7–15.7)

## 2023-03-14 PROCEDURE — 97110 THERAPEUTIC EXERCISES: CPT | Mod: GP

## 2023-03-14 PROCEDURE — 36415 COLL VENOUS BLD VENIPUNCTURE: CPT

## 2023-03-14 PROCEDURE — 250N000013 HC RX MED GY IP 250 OP 250 PS 637: Performed by: INTERNAL MEDICINE

## 2023-03-14 PROCEDURE — 97535 SELF CARE MNGMENT TRAINING: CPT | Mod: GO

## 2023-03-14 PROCEDURE — 250N000011 HC RX IP 250 OP 636

## 2023-03-14 PROCEDURE — 99222 1ST HOSP IP/OBS MODERATE 55: CPT | Performed by: CLINICAL NURSE SPECIALIST

## 2023-03-14 PROCEDURE — 250N000013 HC RX MED GY IP 250 OP 250 PS 637: Performed by: PHYSICIAN ASSISTANT

## 2023-03-14 PROCEDURE — 250N000013 HC RX MED GY IP 250 OP 250 PS 637: Performed by: CLINICAL NURSE SPECIALIST

## 2023-03-14 PROCEDURE — 85018 HEMOGLOBIN: CPT

## 2023-03-14 PROCEDURE — 97530 THERAPEUTIC ACTIVITIES: CPT | Mod: GP

## 2023-03-14 PROCEDURE — 97116 GAIT TRAINING THERAPY: CPT | Mod: GP

## 2023-03-14 PROCEDURE — 99239 HOSP IP/OBS DSCHRG MGMT >30: CPT | Performed by: INTERNAL MEDICINE

## 2023-03-14 RX ORDER — TIZANIDINE 2 MG/1
2 TABLET ORAL 3 TIMES DAILY PRN
Qty: 30 TABLET | Refills: 0 | Status: SHIPPED | OUTPATIENT
Start: 2023-03-14 | End: 2024-01-29

## 2023-03-14 RX ORDER — HYDROXYZINE HYDROCHLORIDE 25 MG/1
25 TABLET, FILM COATED ORAL 2 TIMES DAILY PRN
Status: DISCONTINUED | OUTPATIENT
Start: 2023-03-14 | End: 2023-03-14 | Stop reason: HOSPADM

## 2023-03-14 RX ORDER — AMLODIPINE BESYLATE 5 MG/1
10 TABLET ORAL DAILY
Status: DISCONTINUED | OUTPATIENT
Start: 2023-03-15 | End: 2023-03-14 | Stop reason: HOSPADM

## 2023-03-14 RX ORDER — AMLODIPINE BESYLATE 5 MG/1
5 TABLET ORAL ONCE
Status: COMPLETED | OUTPATIENT
Start: 2023-03-14 | End: 2023-03-14

## 2023-03-14 RX ORDER — HYDROXYZINE HYDROCHLORIDE 25 MG/1
25 TABLET, FILM COATED ORAL EVERY 6 HOURS PRN
Qty: 30 TABLET | Refills: 0 | Status: SHIPPED | OUTPATIENT
Start: 2023-03-14 | End: 2024-01-29

## 2023-03-14 RX ORDER — ENOXAPARIN SODIUM 100 MG/ML
40 INJECTION SUBCUTANEOUS EVERY 24 HOURS
Qty: 12 ML | Refills: 0 | Status: SHIPPED | OUTPATIENT
Start: 2023-03-15 | End: 2023-04-14

## 2023-03-14 RX ORDER — ACETAMINOPHEN 325 MG/1
975 TABLET ORAL EVERY 6 HOURS
Qty: 100 TABLET | Refills: 0 | Status: SHIPPED | OUTPATIENT
Start: 2023-03-14 | End: 2024-01-29

## 2023-03-14 RX ORDER — AMLODIPINE BESYLATE 10 MG/1
10 TABLET ORAL DAILY
DISCHARGE
Start: 2023-03-15 | End: 2024-01-29

## 2023-03-14 RX ORDER — AMOXICILLIN 250 MG
1 CAPSULE ORAL 2 TIMES DAILY PRN
Qty: 60 TABLET | Refills: 0 | Status: SHIPPED | OUTPATIENT
Start: 2023-03-14 | End: 2024-01-29

## 2023-03-14 RX ORDER — HYDROMORPHONE HYDROCHLORIDE 2 MG/1
2-4 TABLET ORAL EVERY 4 HOURS PRN
Qty: 25 TABLET | Refills: 0 | Status: SHIPPED | OUTPATIENT
Start: 2023-03-14 | End: 2024-01-29

## 2023-03-14 RX ORDER — POLYETHYLENE GLYCOL 3350 17 G/17G
17 POWDER, FOR SOLUTION ORAL DAILY
DISCHARGE
Start: 2023-03-15 | End: 2024-01-29

## 2023-03-14 RX ORDER — UREA 10 %
500 LOTION (ML) TOPICAL 2 TIMES DAILY
Status: DISCONTINUED | OUTPATIENT
Start: 2023-03-14 | End: 2023-03-14 | Stop reason: HOSPADM

## 2023-03-14 RX ADMIN — Medication 500 MG: at 14:26

## 2023-03-14 RX ADMIN — METHOCARBAMOL 500 MG: 500 TABLET, FILM COATED ORAL at 14:26

## 2023-03-14 RX ADMIN — ENOXAPARIN SODIUM 40 MG: 40 INJECTION SUBCUTANEOUS at 06:08

## 2023-03-14 RX ADMIN — HYDROMORPHONE HYDROCHLORIDE 2 MG: 2 TABLET ORAL at 09:20

## 2023-03-14 RX ADMIN — HYDROMORPHONE HYDROCHLORIDE 4 MG: 2 TABLET ORAL at 15:56

## 2023-03-14 RX ADMIN — ACETAMINOPHEN 975 MG: 325 TABLET ORAL at 11:18

## 2023-03-14 RX ADMIN — AMLODIPINE BESYLATE 5 MG: 5 TABLET ORAL at 09:18

## 2023-03-14 RX ADMIN — ACETAMINOPHEN 975 MG: 325 TABLET ORAL at 16:01

## 2023-03-14 RX ADMIN — AMLODIPINE BESYLATE 5 MG: 5 TABLET ORAL at 11:18

## 2023-03-14 RX ADMIN — SENNOSIDES AND DOCUSATE SODIUM 1 TABLET: 50; 8.6 TABLET ORAL at 09:20

## 2023-03-14 RX ADMIN — ACETAMINOPHEN 975 MG: 325 TABLET ORAL at 03:56

## 2023-03-14 RX ADMIN — METHOCARBAMOL 500 MG: 500 TABLET, FILM COATED ORAL at 09:19

## 2023-03-14 ASSESSMENT — ACTIVITIES OF DAILY LIVING (ADL)
ADLS_ACUITY_SCORE: 31
ADLS_ACUITY_SCORE: 31
ADLS_ACUITY_SCORE: 28
ADLS_ACUITY_SCORE: 29
ADLS_ACUITY_SCORE: 28

## 2023-03-14 NOTE — PLAN OF CARE
Occupational Therapy Discharge Summary    Reason for therapy discharge:    Discharged to transitional care facility.    Progress towards therapy goal(s). See goals on Care Plan in [facility] electronic health record for goal details.  Goals partially met.  Barriers to achieving goals:   limited tolerance for therapy.    Therapy recommendation(s):    Continued therapy is recommended.  Rationale/Recommendations:  for further progression of ADL independence toward baseline.

## 2023-03-14 NOTE — PLAN OF CARE
"  Problem: Plan of Care - These are the overarching goals to be used throughout the patient stay.    Goal: Readiness for Transition of Care  Outcome: Progressing     Problem: Hip Fracture Medical Management  Goal: Optimal Coping with Change in Health Status  Outcome: Adequate for Care Transition  Goal: Absence of Bleeding  Outcome: Adequate for Care Transition  Goal: Effective Bowel Elimination  Outcome: Adequate for Care Transition  Goal: Baseline Cognitive Function Maintained  Outcome: Adequate for Care Transition  Goal: Absence of Embolism  Outcome: Adequate for Care Transition  Goal: Fracture Stability  Outcome: Adequate for Care Transition  Goal: Optimal Functional Performance  Outcome: Adequate for Care Transition  Intervention: Promote Optimal Functional Status  Recent Flowsheet Documentation  Taken 3/14/2023 0920 by Nilam Samuel  Activity Management: activity adjusted per tolerance  Goal: Optimal Pain Control and Function  Outcome: Adequate for Care Transition  Goal: Effective Urinary Elimination  Outcome: Adequate for Care Transition     Problem: Pain Acute  Goal: Optimal Pain Control and Function  Outcome: Adequate for Care Transition     Problem: Plan of Care - These are the overarching goals to be used throughout the patient stay.    Goal: Absence of Hospital-Acquired Illness or Injury  Intervention: Prevent Skin Injury  Recent Flowsheet Documentation  Taken 3/14/2023 0920 by Nilam Samuel  Body Position: sitting up in bed   Goal Outcome Evaluation:       Pt. Is oriented and alert today, states\" Im a little tired today\". She denies increased pain in am and rep. Feeling achy in (L) hip rated 4/10 on pain scale. Slept well. She was able to complete sup LE gentle ROM ex's and required assist x 2 for bed mob and transfer to chair. She was able to complete stand with contact guard assist and verbal cueing. After rest was duke to complete ambulation  x approximately 5 feet with contact guard assist. " She rep. LOP in (L) hip increased from 2 to 4 post ambulation and was able to maintain 02 sats at 94% after decreased 02 to 1 Liter from 3 Liters. Pt continues to meet goals set and is planned for discharge to transitional care facility today at 4:00pm.

## 2023-03-14 NOTE — CONSULTS
"[facility] ACUTE PAIN SERVICE    ([facility], [facility])   Consult Note    Date of Admission:  3/11/2023  Date of Consult: 03/14/23  Physician requesting consult: Julio César Sellers MD   Reason for consult: for acute post operative pain on chronic hip pain     Assessment/Plan:     Cristiane Birmingham is a 82 year old female who was admitted on 3/11/2023.  3 Days Post-Op left hip hemiarthroplasty.  Pain Service ics asked to see the patient for acute post operative pain with chronic pain.  Admitted for evaluation of left hip pain after falling on some ice while walking out of [employer].  Patient denies head trauma, neck pain, chest pain, abdominal pain, or any other concerns at time of examination in ED.  Imaging demonstrated left displaced femoral neck fracture.  Ortho consulted with decision to undergo left hip hemiarthroplasty.   History of hypertension, hyperlipidemia, hypothyroidism.     Over the past 24 hours Cristiane received 3(0.2 mg IV hydromorphone 12 MME, (1)500 mg Robaxin and (1))25 mg atarax.    Patient identifies pain in left hip that radiates down around back and down thigh.   Pain currently at a \"4-5\" with movement, down to a \"2-3\" now at rest and after receiving oral dilaudid.   The patient does not smoke and no chemical dependency history.     PLAN:   1) Pain is consistent with acute post operative pain.    The patient's home MME was 0 mg daily.  2)Multimodal Medication Therapy  Topical: lidocaine patch 1-3   NSAID'S: avoid  Muscle Relaxants: Robaxin 500 mg tid  Adjuvants: vistaril 25 mg bid prn, tylenol 975 mg tid   Antidepressants/anxiolytics: none  Opioids: dilaudid 2-4 mg every 4 hours prn   IV Pain medication:  IV hydromorphone 0.2 to 0.4 mg every 2 hours prn   3)Non-medication interventions  Acupuncture consult- as available    Integrative consult - called referral to [phone]   4)Constipation Prophylaxis  Daily stool softener/laxative   5) Follow up   -Opioid prescriber has " been none  -Discharge Recommendations - We recommend prescribing the following at the time of discharge:   Hydromorphone 2 mg qid prn, continue robaxin 500 mg tid for one week, scheduled tylenol tid,           History of Present Illness (HPI):       Cristiane Birmingham is a 82 year old female the pain is reported to be acute pain due to left hip fractures now status post operative.  Pain is improving and able to transition to oral pain medications.  Patient states she has history of chronic lower back pain that she has dealt with over the years.  She is not on daily pain medications.  Had an injection once.          [place]  pulled from system on 03/14/23. Last refill on 6/22/23. This indicated one opioid prescription over this past year.  Oxycodone/APAP 5/325 mg tablets 20 for 3 days opioid use.       Medical History  Patient Active Problem List    Diagnosis Date Noted     Fall, initial encounter 03/11/2023     Priority: Medium     Closed fracture of neck of left femur, initial encounter (H) 03/11/2023     Priority: Medium     Closed intertrochanteric fracture of hip, left, initial encounter (H) 03/11/2023     Priority: Medium     Benign essential hypertension 11/04/2020     Priority: Medium     Degenerative arthritis 11/04/2020     Priority: Medium     Diverticulitis of colon 11/04/2020     Priority: Medium     Gastroesophageal reflux disease 11/04/2020     Priority: Medium     Hyperlipidemia 11/04/2020     Priority: Medium     Hypothyroidism 11/04/2020     Priority: Medium     Senile incipient cataract 11/04/2020     Priority: Medium     Varicose veins of both lower extremities 11/04/2020     Priority: Medium     PVC's (premature ventricular contractions) 02/06/2020     Priority: Medium     Dyspnea on exertion 02/06/2020     Priority: Medium     Palpitations 02/06/2020     Priority: Medium        Surgical History  She  has a past surgical history that includes Breast Cyst Aspiration (Right, 2014); Biopsy breast (Left,  2009); Biopsy breast (Right, 2014); tubal ligation; Strip vein; and Open reduction internal fixation hip bipolar (Left, 3/11/2023).     Past Surgical History:   Procedure Laterality Date     BIOPSY BREAST Left 2009     BIOPSY BREAST Right 2014    benign     BREAST CYST ASPIRATION Right 2014     OPEN REDUCTION INTERNAL FIXATION HIP BIPOLAR Left 3/11/2023    Procedure: HEMIARTHROPLASTY, HIP, BIPOLAR;  Surgeon: Khadar Kennedy MD;  Location: [facility] OR     STRIP VEIN       TUBAL LIGATION         Allergies  Allergies   Allergen Reactions     Fosamax [Alendronic Acid] Unknown     Metronidazole Unknown     Sulfa (Sulfonamide Antibiotics) [Sulfa Drugs] Unknown       Prior to Admission Medications   Medications Prior to Admission   Medication Sig Dispense Refill Last Dose     ascorbic acid, vitamin C, (VITAMIN C) 1000 MG tablet Take 1,000 mg by mouth four times a week Takes Su, M, W, F   3/10/2023     aspirin 81 MG EC tablet Take 81 mg by mouth four times a week Takes Su, M, W, F   3/10/2023     atorvastatin (LIPITOR) 10 MG tablet [ATORVASTATIN (LIPITOR) 10 MG TABLET] Take 5 mg by mouth at bedtime.   3/10/2023     calcium carbonate (CALCIUM 600 ORAL) Take 600 mg by mouth four times a week Takes Su, M, W, F   3/10/2023     levothyroxine (SYNTHROID, LEVOTHROID) 100 MCG tablet Take 100 mcg by mouth four times a week Takes Su, M, W, F   3/10/2023     magnesium 200 mg Tab Take 200 mg by mouth four times a week Takes Su, M, W, F   3/10/2023     multivitamin-minerals-lutein (CENTRUM SILVER) tablet Take 1 tablet by mouth four times a week Takes Su, M, W, F   3/10/2023     omega 3-dha-epa-fish oil (FISH OIL) 1,000 mg (120 mg-180 mg) cap Take 1 capsule by mouth four times a week Takes Su, M, W, F   3/10/2023     omeprazole (PRILOSEC) 20 MG capsule Take 20 mg by mouth four times a week Takes Su, M, W, F   3/10/2023       Social History  Reviewed, and she  reports that she has quit smoking. She has never used smokeless  "tobacco.  Social History     Tobacco Use     Smoking status: Former     Smokeless tobacco: Never     Tobacco comments:     briefly while in college   Substance Use Topics     Alcohol use: Not on file       Family History  Reviewed, and family history includes Brain Cancer in her maternal uncle; Breast Cancer in her cousin, cousin, and cousin; Breast Cancer (age of onset: 40.00) in her cousin; Cancer in her cousin, maternal uncle, and maternal uncle; Colon Cancer in her father, maternal grandfather, paternal uncle, and another family member; Colon Cancer (age of onset: 70.00) in her sister; Lung Cancer in her cousin, cousin, cousin, and cousin; Pancreatic Cancer in an other family member; Pancreatic Cancer (age of onset: 92.00) in her mother; Prostate Cancer in her paternal grandfather; Skin Cancer in her brother and brother; Stomach Cancer (age of onset: 50.00) in her cousin.    Review of Systems  Complete ROS reviewed, unless noted, all other systems reviewed and found to be negative.        Objective:     Physical Exam:  /71 (BP Location: Right arm)   Pulse 97   Temp 97.6  F (36.4  C) (Oral)   Resp 18   Ht 1.651 m (5' 5\")   Wt 71 kg (156 lb 8.4 oz)   SpO2 98%   BMI 26.05 kg/m    Weight:   Weight change:   Body mass index is 26.05 kg/m .      General Appearance:  Alert, cooperative, pleasant, sitting up in bed   Head:  Normocephalic, without obvious abnormality, atraumatic   Eyes:  PERRL, conjunctiva/corneas clear, EOM's intact   ENT/Throat: Lips, mucosa, and tongue normal; teeth and gums normal   Lymph/Neck: Supple, symmetrical, trachea midline   Lungs:   respirations unlabored   Chest Wall:  No tenderness or deformity   Cardiovascular/Heart:  Regular rate and rhythm Edema: none   Abdomen:   Soft, non-tender, non distended   Musculoskeletal: Spine and extremities normal, decreased range of motion of left hip   Skin: Skin color, texture, turgor normal, no rashes or lesions   Neurologic: cooridanated " movement  Alert and oriented X 3       Psych: Affect is appropriate to circumstance            Imaging Reviewed  XR Pelvis w Hip Port Left G/E 2 Views    Result Date: 3/12/2023  EXAM: XR PELVIS AND HIP PORTABLE LEFT 2 VIEWS LOCATION: [facility] DATE/TIME: 3/12/2023 3:24 AM INDICATION: Left hip surgery. Postoperative state. Follow-up. COMPARISON: X-ray pelvis single view 03/11/2023 at 1146 hours.     IMPRESSION: Interval postoperative changes of left hip endoprosthesis placement for left femoral neck comminuted fracture. Hardware is well seated with good alignment. Small amount of postoperative soft tissue gas. Degenerative changes lower lumbar spine  and joints of the pelvis. Demineralization of the visualized bones. Remote changes of tubal ligation bilaterally.    XR Hip Left 2-3 Views    Result Date: 3/13/2023  EXAM: XR HIP LEFT 2-3 VIEWS LOCATION: [facility] DATE/TIME: 3/13/2023 8:01 PM INDICATION: increase pain COMPARISON: 03/12/2023     IMPRESSION: Bipolar left hip arthroplasty. Components are well seated. No fractures are evident. Osteopenia. Degenerative changes lower lumbar spine. Fallopian tube closure devices.    XR Pelvis 1/2 Views    Result Date: 3/11/2023  EXAM: XR PELVIS 1/2 VIEWS LOCATION: [facility] DATE/TIME: 03/11/2023, 12:07 PM INDICATION: Trauma, pain. COMPARISON: None.     IMPRESSION: Fracture through the left femoral neck without evidence for intertrochanteric extension. No dislocation. Degenerative change both hip joints. Diffuse demineralization. Pelvis negative for fracture.     Cervical spine CT w/o contrast    Result Date: 3/11/2023  EXAM: CT HEAD W/O CONTRAST, CT CERVICAL SPINE W/O CONTRAST LOCATION: [facility] DATE/TIME: 3/11/2023 12:29 PM INDICATION: Trauma, injury. COMPARISON: None.     IMPRESSION: HEAD CT: 1.  No intracranial hemorrhage, mass lesions, hydrocephalus or CT evidence for  "an acute infarct. 2.  Mild diffuse cerebral parenchymal volume loss. Presumed chronic hypertensive/microvascular ischemic white matter changes. CERVICAL SPINE CT: 1.  No CT evidence for acute fracture or post traumatic subluxation.    Head CT w/o contrast    Result Date: 3/11/2023  EXAM: CT HEAD W/O CONTRAST, CT CERVICAL SPINE W/O CONTRAST LOCATION: [facility] DATE/TIME: 3/11/2023 12:29 PM INDICATION: Trauma, injury. COMPARISON: None.     IMPRESSION: HEAD CT: 1.  No intracranial hemorrhage, mass lesions, hydrocephalus or CT evidence for an acute infarct. 2.  Mild diffuse cerebral parenchymal volume loss. Presumed chronic hypertensive/microvascular ischemic white matter changes. CERVICAL SPINE CT: 1.  No CT evidence for acute fracture or post traumatic subluxation.    POC US Guidance Needle Placement    Result Date: 3/11/2023  Ultrasound was performed as guidance to an anesthesia procedure.  Click \"PACS images\" hyperlink below to view any stored images.  For specific procedure details, view procedure note authored by anesthesia.      Labs Reviewed        MANAGEMENT DISCUSSED with the following over the past 24 hours: RN   NOTE(S)/MEDICAL RECORDS REVIEWED over the past 24 hours: Ortho Consult, ED Physician notes, Hospital Medicine H&P and progress notes  Medical complexity over the past 24 hours:  - review of current pain medications prescribed, no changes          Thank you for this consultation.      Effie Vaughn APRN, CNS-BC, DNP  Acute Care Pain Management Program  [facility] ([place], [place], [place])   With questions call [phone]  Preference if for [employer] Paging - Johana  Click HERE to page [place]              "

## 2023-03-14 NOTE — PLAN OF CARE
Physical Therapy Discharge Summary    Reason for therapy discharge:    Discharged to TCU.    Progress towards therapy goal(s). See goals on Care Plan in [facility] electronic health record for goal details.  Goals partially met.  Barriers to achieving goals:   discharge from facility.    Therapy recommendation(s):    Further recommended therapy is related to documented deficits, and is necessary to maximize functional independence in order for patient to return to prior level of function.      Gemini Strong, CHRIST 3/14/2023

## 2023-03-14 NOTE — PLAN OF CARE
Problem: Plan of Care - These are the overarching goals to be used throughout the patient stay.    Goal: Plan of Care Review  Description: The Plan of Care Review/Shift note should be completed every shift.  The Outcome Evaluation is a brief statement about your assessment that the patient is improving, declining, or no change.  This information will be displayed automatically on your shift note.  Outcome: Progressing   Goal Outcome Evaluation:  Plan of care reviewed with pt she is alert and oriented. She will be leaving for TCU via stretcher at 1600. Pain has been better controled with scheduled tylenol/robaxin and prn dilaudid. Pain has been as low as 3 today. She was able to do PT/OT. BY end of day she was up with 1 assist and walker. Purewick was removed and pt voided large amt and had  a bm.

## 2023-03-14 NOTE — PROGRESS NOTES
Care Management Discharge Note    Discharge Date: 03/14/2023       Discharge Disposition: Transitional Care    Discharge Services: None    Discharge DME:  (per treatment team)    Discharge Transportation: health plan transportation    Private pay costs discussed: transportation costs    PAS Confirmation Code:  ([id number])  Patient/family educated on Medicare website which has current facility and service quality ratings: yes    Education Provided on the Discharge Plan:  yes  Persons Notified of Discharge Plans: patient and spouse   Patient/Family in Agreement with the Plan: yes    Handoff Referral Completed: sending orders when available     Additional Information:  SW discussed updates with ortho and attending MD, Pt cleared for discharge. Pt discharging to [facility] via [facility] stretcher (due to unhealed fracture and pain) transport at 1600.  SW discussed updates with Pt and spouse.  Pt expresses some concerns regarding current pain.  SW reassured Pt that she will be able to get medications at TCU.  PCS and PAS completed and on chart.         NAGA Romero

## 2023-03-14 NOTE — DISCHARGE SUMMARY
[facility]  Hospitalist Discharge Summary      Date of Admission:  3/11/2023  Date of Discharge:  3/14/2023  Discharging Provider: Julio César Sellers MD  Discharge Service: Hospitalist Service    Discharge Diagnoses         Cristiane Birmingham is a 82 year old female admitted on 3/11/2023. She has been admitted post fall. Patient was walking into the [employer] today this am when she slipped on ice and fell on the ground following which she had severe left hip pain. Patient subsequently came to the ER for further evaluation and management. She was found to have Fracture through the left femoral neck without evidence for intertrochanteric extension. Will keep NPO, on iv fluids, plan to go to OR, ortho consulted, pain control. Patient is medically cleared for surgery.           3/14:      Medically stable  Patient to be discharged to tcu today      A/p :           S/p fall, mechanical    Fracture through the left femoral neck without evidence for intertrochanteric extension.     S/p Left hp cemented bipolar hemiarthroplasty - posterior approach  Stable post op  Outpatient ortho f/u            Leucocytosis : ? reactive       HLD : on statin       Hypothyroidism : on levothyroxine 100 mcg daily       GERD :on ppi       Elevated BP : on iv hydralazine prn               Follow-ups Needed After Discharge   Follow-up Appointments     Follow Up Care      Please follow-up with Dr. Kennedy's team in 2 weeks at [facility]. Call our scheduling line at [phone] to make an   appointment if you do not already have one scheduled.         Follow Up and recommended labs and tests      Follow up with Nursing home physician.  No follow up labs or test are   needed.    Follow up with [place]       As advised         {Additional follow-up instructions/to-do's for PCP    : none    Unresulted Labs Ordered in the Past 30 Days of this Admission     No orders found from 2/9/2023 to 3/12/2023.      These results will  be followed up by pcp    Discharge Disposition   Discharged to nursing home  Condition at discharge: Stable          Consultations This Hospital Stay   PHYSICAL THERAPY ADULT IP CONSULT  OCCUPATIONAL THERAPY ADULT IP CONSULT  CARE MANAGEMENT / SOCIAL WORK IP CONSULT  PAIN MANAGEMENT ADULT IP CONSULT  PHYSICAL THERAPY ADULT IP CONSULT  OCCUPATIONAL THERAPY ADULT IP CONSULT  PHYSICAL THERAPY ADULT IP CONSULT  OCCUPATIONAL THERAPY ADULT IP CONSULT  PHYSICAL THERAPY ADULT IP CONSULT  OCCUPATIONAL THERAPY ADULT IP CONSULT    Code Status   Full Code    Time Spent on this Encounter   I, Julio César Sellers MD, personally saw the patient today and spent greater than 30 minutes discharging this patient.       Julio César Sellers MD  [address] [address]  Phone: [phone]  Fax: [phone]  ______________________________________________________________________    Physical Exam   Vital Signs: Temp: 98.1  F (36.7  C) Temp src: Oral BP: (!) 144/65 Pulse: 90   Resp: 14 SpO2: 98 % O2 Device: Nasal cannula Oxygen Delivery: 3 LPM  Weight: 156 lbs 8.43 oz       GENERAL: The patient is not in any acute distressed. Awake and alert.  HEENT: Nonicteric sclerae, PERRLA, EOMI. Oropharynx clear. Moist mucous membranes. Conjunctivae appear well perfused.  HEART: Regular rate and rhythm without murmurs.  LUNGS: Clear to auscultation bilaterally. No wheezing or crackles.  ABDOMEN: Soft, positive bowel sounds, nontender.  SKIN: No rash, no excessive bruising, petechiae, or purpura.  EXTREMITIES : no rashes, no swelling in legs.  NEUROLOGIC: conscious and oriented, follows commands, no obvious focal deficits.  ROS: All other systems negative          Primary Care Physician   Wes Lyon    Discharge Orders      Primary Care - Care Coordination Referral      General info for SNF    Length of Stay Estimate: Short Term Care: Estimated # of Days <30 Condition at Discharge: Improving Level  "of care:skilled  Rehabilitation Potential: Good Admission H&P remains valid and up-to-date: Yes Recent Chemotherapy: N/A Use Nursing Home Standing Orders: N/A     Mantoux Instructions    Give two-step Mantoux (PPD) Per Facility Policy {.:[id number]     Incentive Spirometry    Incentive Spirometry 10 times per hour, 4 times per day.     Reason for your hospital stay    Hip fracture, s/p hip surgery     When to call - Contact Surgeon Team    You may experience symptoms that require follow-up before your scheduled appointment. Refer to the \"Stoplight Tool\" for instructions on when to contact your Surgeon Team if you are concerned about pain control, blood clots, constipation, or if you are unable to urinate.     When to call - Reach out to Urgent Care    If you are not able to reach your Surgeon Team and you need immediate care, go to the Orthopedic Walk-in Clinic or Urgent Care at your Surgeon's office.  Do NOT go to the Emergency Room unless you have shortness of breath, chest pain, or other signs of a medical emergency.     When to call - Reasons to Call 911    Call 911 immediately if you experience sudden-onset chest pain, arm weakness/numbness, slurred speech, or shortness of breath     Symptoms - Fever Management    A low grade fever can be expected after surgery.  Use acetaminophen (TYLENOL) as needed for fever management.  Contact your Surgeon Team if you have a fever greater than 101.5 F, chills, and/or night sweats.     Symptoms - Constipation management    Constipation (hard, dry bowel movements) is expected after surgery due to the combination of being less active, the anesthetic, and the opioid pain medication.  You can do the following to help reduce constipation:  ~  FLUIDS:  Drink clear liquids (water or Gatorade), or juice (apple/prune).  ~  DIET:  Eat a fiber rich diet.    ~  ACTIVITY:  Get up and move around several times a day.  Increase your activity as you are able.  MEDICATIONS:  Reduce the risk of " constipation by starting medications before you are constipated.  You can take Miralax   (1 packet as directed) and/or a stool softener (Senokot 1-2 tablets 1-2 times a day).  If you already have constipation and these medications are not working, you can get magnesium citrate and use as directed.  If you continue to have constipation you can try an over the counter suppository or enema.  Call your Surgeon Team if it has been greater than 3 days since your last bowel movement.     Symptoms - Reduced Urine Output    Changes in the amount of fluids you drank before and after surgery may result in problems urinating.  It is important to stay well-hydrated after surgery and drink plenty of water. If it has been greater than 8 hours since you have urinated despite drinking plenty of water, call your Surgeon Team.     Activity - Exercises to prevent blood clots    Unless otherwise directed by your Surgeon team, perform the following exercises at least three times per day for the first four weeks after surgery to prevent blood clots in your legs: 1) Point and flex your feet (Ankle Pumps), 2) Move your ankle around in big circles, 3) Wiggle your toes, 4) Walk, even for short distances, several times a day, will help decrease the risk of blood clots.     Comfort and Pain Management - Pain after Surgery    Pain after surgery is normal and expected.  You will have some amount of pain for several weeks after surgery.  Your pain will improve with time.  There are several things you can do to help reduce your pain including: rest, ice, elevation, and using pain medications as needed. Contact your Surgeon Team if you have pain that persists or worsens after surgery despite rest, ice, elevation, and taking your medication(s) as prescribed. Contact your Surgeon Team if you have new numbness, tingling, or weakness in your operative extremity.     Comfort and Pain Management - Swelling after Surgery    Swelling and/or bruising of the  surgical extremity is common and may persist for several months after surgery. In addition to frequent icing and elevation, gentle compressive support with an ACE wrap or tubigrip may help with swelling. Apply compression regularly, removing at least twice daily to perform skin checks. Contact your Surgeon Team if your swelling increases and is NOT associated with an increase in your activity level, or if your swelling increases and is associated with redness and pain.     Comfort and Pain Management - Cold therapy    Ice can be used to control swelling and discomfort after surgery. Place a thin towel over your operative site and apply the ice pack overtop. Leave ice pack in place for 20 minutes, then remove for 20 minutes. Repeat this 20 minutes on/20 minutes off routine as often as tolerated.     Medication Instructions - Acetaminophen (TYLENOL) Instructions    You were discharged with acetaminophen (TYLENOL) for pain management after surgery. Acetaminophen most effectively manages pain symptoms when it is taken on a schedule without missing doses (every four, six, or eight hours). Your Provider will prescribe a safe daily dose between 3000 - 4000 mg.  Do NOT exceed this daily dose. Most patients use acetaminophen for pain control for the first four weeks after surgery.  You can wean from this medication as your pain decreases.     Medication Instructions - NSAID Instructions    You were discharged with an anti-inflammatory medication for pain management to use in combination with acetaminophen (TYLENOL) and the narcotic pain medication.  Take this medication exactly as directed.  You should only take one anti-inflammatory at a time.  Some common anti-inflammatories include: ibuprofen (ADVIL, MOTRIN), naproxen (ALEVE, NAPROSYN), celecoxib (CELEBREX), meloxicam (MOBIC), ketorolac (TORADOL).  Take this medication with food and water.     Medication Instructions - Opioids - Tapering Instructions    In the first three  "days following surgery, your symptoms may warrant use of the narcotic pain medication every four to six hours as prescribed. This is normal. As your pain symptoms improve, focus your efforts on decreasing (tapering) use of narcotic medications. The most successful tapering strategy is to first, decrease the number of tablets you take every 4-6 hours to the minimum prescribed. Then, increase the amount of time between doses.  For example:  First, taper to   or 1 tablet every 4-6 hours.  Then, taper to   or 1 tablet every 6-8 hours.  Then, taper to   or 1 tablet every 8-10 hours.  Then, taper to   or 1 tablet every 10-12 hours.  Then, taper to   or 1 tablet at bedtime.  The bedtime dose can help with comfort during sleep and is typically the last dose to be discontinued after surgery.     Follow Up Care    Please follow-up with Dr. Kennedy's team in 2 weeks at [facility]. Call our scheduling line at [phone] to make an appointment if you do not already have one scheduled.     Shower with wound/dressing covered    You must COVER your dressing or incision with saran wrap (or any other non-permeable covering) to allow the incision to remain dry while showering.  You may shower 3 days after surgery as long as the surgical wound stays dry. Continue to cover your dressing or incision for showering until your first office visit.  You are strictly prohibited from soaking   or submerging the surgical wound underwater.     Medication instructions - Anticoagulation - other    Take the Lovenox as prescribed for a total of 4 weeks after surgery.  This is given to help minimize your risk of blood clot     Comfort and Pain Management - LOWER Extremity Elevation    Swelling is expected for several months after surgery. This type of swelling is usually associated with gravity and activity, and can be improved with elevation.   The best way to do this is to get your \"toes above your nose\" by laying down and placing several " pillows lengthwise under your calf and heel. When elevating your leg keep your knee completely straight. Perform this elevation as often as possible especially for the first two weeks after surgery.     Medication Instructions - Opioid Instructions (Greater than or equal to 65 years)    You were discharged with an opioid medication (hydromorphone, oxycodone, hydrocodone, or tramadol). This medication should only be taken for breakthrough pain that is not controlled with acetaminophen (TYLENOL). If you rate your pain less than 3 you do not need this medication.  Pain rating 0-3:  You do not need this medication  Pain rating 4-6:  Take 1/2 tablet every 4-6 hours as needed  Pain rating 7-10:  Take 1 tablet every 4-6 hours as needed  Do not exceed 4 tablets per day     General info for SNF    Length of Stay Estimate: Short Term Care: Estimated # of Days <30  Condition at Discharge: Stable  Level of care:skilled   Rehabilitation Potential: Fair  Admission H&P remains valid and up-to-date: Yes  Recent Chemotherapy: N/A  Use Nursing Home Standing Orders: Yes     Mantoux instructions    Give two-step Mantoux (PPD) Per Facility Policy Yes     Follow Up and recommended labs and tests    Follow up with Nursing home physician.  No follow up labs or test are needed.    Follow up with [place] [facility]       As advised     Reason for your hospital stay    Fall, hip #     Activity - Up with nursing assistance     Full Code     Physical Therapy Adult Consult    Evaluate and treat as clinically indicated.    Reason: Status Post Hip Surgery     Occupational Therapy Adult Consult    Evaluate and treat as clinically indicated.    Reason: Status Post Hip Surgery     Physical Therapy Adult Consult    Evaluate and treat as clinically indicated.    Reason:  deconditioning     Occupational Therapy Adult Consult    Evaluate and treat as clinically indicated.    Reason:  deconditioning     Fall precautions     Crutches DME    DME Documentation: Describe  the reason for need to support medical necessity: Impaired gait status post hip surgery. I, the undersigned, certify that the above prescribed supplies are medically necessary for this patient and is both reasonable and necessary in reference to accepted standards of medical practice in the treatment of this patient's condition and is not prescribed as a convenience.     Cane DME    DME Documentation: Describe the reason for need to support medical necessity: Impaired gait status post hip surgery. I, the undersigned, certify that the above prescribed supplies are medically necessary for this patient and is both reasonable and necessary in reference to accepted standards of medical practice in the treatment of this patient's condition and is not prescribed as a convenience.     [facility] DME    : DME Documentation: Describe the reason for need to support medical necessity: Impaired gait status post hip surgery. I, the undersigned, certify that the above prescribed supplies are medically necessary for this patient and is both reasonable and necessary in reference to accepted standards of medical practice in the treatment of this patient's condition and is not prescribed as a convenience.     Diet    Follow this diet upon discharge: Orders Placed This Encounter      Advance Diet as Tolerated: Regular Diet Adult     Diet    Follow this diet upon discharge: Orders Placed This Encounter      Advance Diet as Tolerated: Regular Diet Adult      Diet       Significant Results and Procedures   Most Recent 3 CBC's:Recent Labs   Lab Test 03/14/23  0509 03/13/23  0513 03/12/23  1034 03/11/23  1441 05/03/21  0912 03/20/19  0908   WBC  --   --   --  13.4* 5.9 6.3   HGB 11.5* 11.3* 12.2 14.9 14.6 14.6   MCV  --   --   --  93 96 95   PLT  --   --   --  235 196 232     Most Recent 3 BMP's:Recent Labs   Lab Test 03/14/23  0604 03/13/23  0747 03/12/23  0806 03/11/23  1441 10/12/22  1528 05/03/21  0912 05/03/21  0912   NA  --   --   --  139  140  --  143   POTASSIUM  --   --   --  3.7 4.3  --  4.1   CHLORIDE  --   --   --  104 103  --  108*   CO2  --   --   --  23 24  --  26   BUN  --   --   --  13.1 24.2*  --  18   CR  --   --  0.67 0.57 0.73  --  0.74   ANIONGAP  --   --   --  12 13  --  9   ANTHONY  --   --   --  9.2 9.1  --  8.9   * 127*  --  142* 125*   < > 130*    < > = values in this interval not displayed.       Discharge Medications   Current Discharge Medication List      START taking these medications    Details   acetaminophen (TYLENOL) 325 MG tablet Take 3 tablets (975 mg) by mouth every 6 hours  Qty: 100 tablet, Refills: 0    Associated Diagnoses: Closed fracture of neck of left femur, initial encounter (H); Status post hip hemiarthroplasty      amLODIPine (NORVASC) 10 MG tablet Take 1 tablet (10 mg) by mouth daily    Associated Diagnoses: Closed intertrochanteric fracture of hip, left, initial encounter (H)      enoxaparin ANTICOAGULANT (LOVENOX) 40 MG/0.4ML syringe Inject 0.4 mLs (40 mg) Subcutaneous every 24 hours for 30 days  Qty: 12 mL, Refills: 0    Associated Diagnoses: Closed fracture of neck of left femur, initial encounter (H); Status post hip hemiarthroplasty      HYDROmorphone (DILAUDID) 2 MG tablet Take 1-2 tablets (2-4 mg) by mouth every 4 hours as needed for breakthrough pain (2 mg if pain rating 2-6, 4 mg if pain rating 7-10. Hold if sedated.)  Qty: 25 tablet, Refills: 0    Associated Diagnoses: Closed fracture of neck of left femur, initial encounter (H); Status post hip hemiarthroplasty      hydrOXYzine (ATARAX) 25 MG tablet Take 1 tablet (25 mg) by mouth every 6 hours as needed for other (adjuvant pain)  Qty: 30 tablet, Refills: 0    Associated Diagnoses: Closed fracture of neck of left femur, initial encounter (H); Status post hip hemiarthroplasty      polyethylene glycol (MIRALAX) 17 g packet Take 17 g by mouth daily    Associated Diagnoses: Closed intertrochanteric fracture of hip, left, initial encounter (H)       senna-docusate (SENOKOT-S/PERICOLACE) 8.6-50 MG tablet Take 1 tablet by mouth 2 times daily as needed for constipation  Qty: 60 tablet, Refills: 0    Associated Diagnoses: Closed fracture of neck of left femur, initial encounter (H); Status post hip hemiarthroplasty      tiZANidine (ZANAFLEX) 2 MG tablet Take 1 tablet (2 mg) by mouth 3 times daily as needed for muscle spasms  Qty: 30 tablet, Refills: 0    Associated Diagnoses: Closed fracture of neck of left femur, initial encounter (H); Status post hip hemiarthroplasty         CONTINUE these medications which have NOT CHANGED    Details   ascorbic acid, vitamin C, (VITAMIN C) 1000 MG tablet Take 1,000 mg by mouth four times a week Takes Kendrick, M, W, F      aspirin 81 MG EC tablet Take 81 mg by mouth four times a week Takes Kendrick, M, W, F      atorvastatin (LIPITOR) 10 MG tablet [ATORVASTATIN (LIPITOR) 10 MG TABLET] Take 5 mg by mouth at bedtime.      calcium carbonate (CALCIUM 600 ORAL) Take 600 mg by mouth four times a week Takes Kendrick, M, W, F      levothyroxine (SYNTHROID, LEVOTHROID) 100 MCG tablet Take 100 mcg by mouth four times a week Takes Kendrick, M, W, F      magnesium 200 mg Tab Take 200 mg by mouth four times a week Takes Kendrick, M, W, F      multivitamin-minerals-lutein (CENTRUM SILVER) tablet Take 1 tablet by mouth four times a week Takes Kendrick, M, W, F      omega 3-dha-epa-fish oil (FISH OIL) 1,000 mg (120 mg-180 mg) cap Take 1 capsule by mouth four times a week Takes Kendrick, M, W, F      omeprazole (PRILOSEC) 20 MG capsule Take 20 mg by mouth four times a week Takes Kendrick, M, W, F           Allergies   Allergies   Allergen Reactions     Fosamax [Alendronic Acid] Unknown     Metronidazole Unknown     Sulfa (Sulfonamide Antibiotics) [Sulfa Drugs] Unknown

## 2023-03-14 NOTE — PLAN OF CARE
Problem: Plan of Care - These are the overarching goals to be used throughout the patient stay.    Goal: Absence of Hospital-Acquired Illness or Injury  Intervention: Prevent and Manage VTE (Venous Thromboembolism) Risk  Recent Flowsheet Documentation  Taken 3/13/2023 1600 by Gabbie Stephenson RN  VTE Prevention/Management: SCDs (sequential compression devices) on     Problem: Plan of Care - These are the overarching goals to be used throughout the patient stay.    Goal: Optimal Comfort and Wellbeing  Outcome: Progressing  Intervention: Monitor Pain and Promote Comfort  Recent Flowsheet Documentation  Taken 3/13/2023 2025 by Gabbie Stephenson RN  Pain Management Interventions: medication (see MAR)  Taken 3/13/2023 1549 by Gabbie Stephenson RN  Pain Management Interventions: medication (see MAR)     Problem: Risk for Delirium  Goal: Improved Behavioral Control  Intervention: Minimize Safety Risk  Recent Flowsheet Documentation  Taken 3/13/2023 1600 by Gabbie Stephenson RN  Enhanced Safety Measures: bed alarm set   Goal Outcome Evaluation:       Patient AAOX4. C/O ongoing aching discomfort in left hip rating it between 6-10 tonight, managed with scheduled tylenol and prn dilaudid. Pain significantly increases with repositoning and drastic movements. Dressing to left hip CDI. Pillow between legs and under heels to keep legs abducted and heels floated. Purewick in place, voiding adequately. Call light within reach. Will continue with plan of care.

## 2023-03-14 NOTE — PLAN OF CARE
"  Problem: Plan of Care - These are the overarching goals to be used throughout the patient stay.    Goal: Absence of Hospital-Acquired Illness or Injury  Outcome: Progressing  Intervention: Identify and Manage Fall Risk  Recent Flowsheet Documentation  Taken 3/14/2023 0050 by Daily Mckay RN  Safety Promotion/Fall Prevention: bed alarm on  Intervention: Prevent Skin Injury  Recent Flowsheet Documentation  Taken 3/14/2023 0050 by Daily Mckay RN  Body Position: position changed independently  Intervention: Prevent and Manage VTE (Venous Thromboembolism) Risk  Recent Flowsheet Documentation  Taken 3/14/2023 0050 by Daily Mckay RN  VTE Prevention/Management: SCDs (sequential compression devices) on     Problem: Plan of Care - These are the overarching goals to be used throughout the patient stay.    Goal: Patient-Specific Goal (Individualized)  Description: You can add care plan individualizations to a care plan. Examples of Individualization might be:  \"Parent requests to be called daily at 9am for status\", \"I have a hard time hearing out of my right ear\", or \"Do not touch me to wake me up as it startles me\".  Outcome: Progressing     Problem: Hip Fracture Medical Management  Goal: Optimal Coping with Change in Health Status  Outcome: Progressing   Goal Outcome Evaluation:       Pt is A/O X4 can be forgetful, complains of pain in her left hip, surgical dressing on the hip is lean and dry, pain mostly felt during repositioning, HR is high in the 90's, on 2 L oxygen with NC sating in the high 90's, pure wich on draining, scheduled tylenol to control pain, BS was under control, no insulin.                 "

## 2023-03-15 NOTE — PROGRESS NOTES
[facility] GERIATRIC SERVICES       Patient Cristiane Birmingham  MRN: [id number]        Reason for Visit     Chief Complaint   Patient presents with     Hospital F/U       Code Status     CPR/Full code     Assessment     Status post left bipolar hemiarthroplasty  Left femoral neck fracture  History of a mechanical fall  Hypothyroidism  GERD  Hyperlipidemia  Generalized weakness    Plan     Pt is admitted to TCU for strengthening and rehab.  Pt admitted to TCU after undergoing left hip bipolar hemiarthroplasty surgery  Date of procedure- 3/11/23  Continue with incisional cares  WBAT  Follow-up with orthopedics as scheduled  Cont PT / OT for strengthening/ gait retraining  Pain management optimized  Tylenol scheduled 1 g every 8 hours  Continue narcotics prn-has Dilaudid as needed  Also given Zanaflex for muscle spasms  Advised aggressive icing  On Lovenox for dvt prophylaxis  Duration to be determined by orthopedics x 30 days  Tubigrip stockings recommended for management of swelling of the lower extremities     Recheck labs  htn controlled  gerd symptoms controlled  Continue with PT/OT-using a walker    History     Patient is a very pleasant 82 year old female who is admitted to TCU  Patient admitted post fall which was felt to be mechanical.  She was noted to have left femoral neck fracture and underwent a left bipolar hemiarthroplasty.  Postprocedure she did well and has been discharged to the TCU      Past Medical & Surgical History     PAST MEDICAL HISTORY:   Past Medical History:   Diagnosis Date     Benign essential hypertension 11/4/2020     Breast cyst 2014     Colon polyp      Degenerative arthritis 11/4/2020     Diverticulitis of colon 11/4/2020     Dyspnea on exertion 2/6/2020     Gastroesophageal reflux disease 11/4/2020     Hyperlipidemia 11/4/2020     Hypothyroidism 11/4/2020     Inverted nipple     hx of inverted nipples     Palpitations 2/6/2020     PVC's (premature ventricular contractions) 2/6/2020      Senile incipient cataract 11/4/2020     Varicose veins of both lower extremities 11/4/2020      PAST SURGICAL HISTORY:   has a past surgical history that includes Breast Cyst Aspiration (Right, 2014); Biopsy breast (Left, 2009); Biopsy breast (Right, 2014); tubal ligation; Strip vein; and Open reduction internal fixation hip bipolar (Left, 3/11/2023).      Past Social History     Reviewed,  reports that she has quit smoking. She has never used smokeless tobacco.    Family History     Reviewed, and family history includes Brain Cancer in her maternal uncle; Breast Cancer in her cousin, cousin, and cousin; Breast Cancer (age of onset: 40.00) in her cousin; Cancer in her cousin, maternal uncle, and maternal uncle; Colon Cancer in her father, maternal grandfather, paternal uncle, and another family member; Colon Cancer (age of onset: 70.00) in her sister; Lung Cancer in her cousin, cousin, cousin, and cousin; Pancreatic Cancer in an other family member; Pancreatic Cancer (age of onset: 92.00) in her mother; Prostate Cancer in her paternal grandfather; Skin Cancer in her brother and brother; Stomach Cancer (age of onset: 50.00) in her cousin.    Medication List     Current Outpatient Medications   Medication     acetaminophen (TYLENOL) 325 MG tablet     amLODIPine (NORVASC) 10 MG tablet     ascorbic acid, vitamin C, (VITAMIN C) 1000 MG tablet     aspirin 81 MG EC tablet     atorvastatin (LIPITOR) 10 MG tablet     calcium carbonate (CALCIUM 600 ORAL)     enoxaparin ANTICOAGULANT (LOVENOX) 40 MG/0.4ML syringe     HYDROmorphone (DILAUDID) 2 MG tablet     hydrOXYzine (ATARAX) 25 MG tablet     levothyroxine (SYNTHROID, LEVOTHROID) 100 MCG tablet     magnesium 200 mg Tab     multivitamin-minerals-lutein (CENTRUM SILVER) tablet     omega 3-dha-epa-fish oil (FISH OIL) 1,000 mg (120 mg-180 mg) cap     omeprazole (PRILOSEC) 20 MG capsule     polyethylene glycol (MIRALAX) 17 g packet     senna-docusate (SENOKOT-S/PERICOLACE) 8.6-50  "MG tablet     tiZANidine (ZANAFLEX) 2 MG tablet     No current facility-administered medications for this visit.          Allergies     Allergies   Allergen Reactions     Fosamax [Alendronic Acid] Unknown     Metronidazole Unknown     Sulfa Drugs Unknown       Review of Systems   A comprehensive review of 14 systems was done. Pertinent findings noted here and in history of present illness. All the rest negative.  Constitutional: Negative.  Negative for fever, chills, she has  activity change, appetite change and fatigue.   HENT: Negative for congestion and facial swelling.    Eyes: Negative for photophobia, redness and visual disturbance.   Respiratory: Negative for cough and chest tightness.    Cardiovascular: Negative for chest pain, palpitations and leg swelling.   Gastrointestinal: Negative for nausea, diarrhea, constipation, blood in stool and abdominal distention.   Genitourinary: Negative.    Musculoskeletal: has pain in hip but controlled   Skin: Negative.    Neurological: Negative for dizziness, tremors, syncope, weakness, light-headedness and headaches.   Hematological: Does not bruise/bleed easily.   Psychiatric/Behavioral: Negative.        Physical Exam   BP (!) 158/68   Pulse 99   Temp 97.9  F (36.6  C)   Resp 16   Ht 1.651 m (5' 5\")   Wt 70.8 kg (156 lb)   SpO2 98%   BMI 25.96 kg/m       Constitutional: Oriented to person, place, and time and appears well-developed.   HEENT:  Normocephalic and atraumatic.  Eyes: Conjunctivae and EOM are normal. Pupils are equal, round, and reactive to light. No discharge.  No scleral icterus. Nose normal. Mouth/Throat: Oropharynx is clear and moist. No oropharyngeal exudate.    NECK: Normal range of motion. Neck supple. No JVD present. No tracheal deviation present. No thyromegaly present.   CARDIOVASCULAR: Normal rate, regular rhythm and intact distal pulses.  Exam reveals no gallop and no friction rub.  Systolic murmur present.  PULMONARY: Effort normal and " breath sounds normal. No respiratory distress.No Wheezing or rales.  ABDOMEN: Soft. Bowel sounds are normal. No distension and no mass.  There is no tenderness. There is no rebound and no guarding. No HSM.  MUSCULOSKELETAL: Normal range of motion. Mild kyphosis, no tenderness.  Left hip incision is intact and with dressing noted  LYMPH NODES: Has no cervical, supraclavicular, axillary and groin adenopathy.   NEUROLOGICAL: Alert and oriented to person, place, and time. No cranial nerve deficit.  Normal muscle tone. Coordination normal.   GENITOURINARY: Deferred exam.  SKIN: Skin is warm and dry. No rash noted. No erythema. No pallor.   EXTREMITIES: No cyanosis, no clubbing, no edema. No Deformity.  PSYCHIATRIC: Normal mood, affect and behavior.      Lab Results     Recent Results (from the past 240 hour(s))   Basic metabolic panel    Collection Time: 03/11/23  2:41 PM   Result Value Ref Range    Sodium 139 136 - 145 mmol/L    Potassium 3.7 3.4 - 5.3 mmol/L    Chloride 104 98 - 107 mmol/L    Carbon Dioxide (CO2) 23 22 - 29 mmol/L    Anion Gap 12 7 - 15 mmol/L    Urea Nitrogen 13.1 8.0 - 23.0 mg/dL    Creatinine 0.57 0.51 - 0.95 mg/dL    Calcium 9.2 8.8 - 10.2 mg/dL    Glucose 142 (H) 70 - 99 mg/dL    GFR Estimate >90 >60 mL/min/1.73m2   INR    Collection Time: 03/11/23  2:41 PM   Result Value Ref Range    INR 0.94 0.85 - 1.15   CBC with platelets and differential    Collection Time: 03/11/23  2:41 PM   Result Value Ref Range    WBC Count 13.4 (H) 4.0 - 11.0 10e3/uL    RBC Count 4.70 3.80 - 5.20 10e6/uL    Hemoglobin 14.9 11.7 - 15.7 g/dL    Hematocrit 43.6 35.0 - 47.0 %    MCV 93 78 - 100 fL    MCH 31.7 26.5 - 33.0 pg    MCHC 34.2 31.5 - 36.5 g/dL    RDW 12.9 10.0 - 15.0 %    Platelet Count 235 150 - 450 10e3/uL    % Neutrophils 82 %    % Lymphocytes 11 %    % Monocytes 6 %    % Eosinophils 0 %    % Basophils 0 %    % Immature Granulocytes 1 %    NRBCs per 100 WBC 0 <1 /100    Absolute Neutrophils 11.1 (H) 1.6 - 8.3  10e3/uL    Absolute Lymphocytes 1.5 0.8 - 5.3 10e3/uL    Absolute Monocytes 0.8 0.0 - 1.3 10e3/uL    Absolute Eosinophils 0.0 0.0 - 0.7 10e3/uL    Absolute Basophils 0.0 0.0 - 0.2 10e3/uL    Absolute Immature Granulocytes 0.1 <=0.4 10e3/uL    Absolute NRBCs 0.0 10e3/uL   Creatinine    Collection Time: 03/12/23  8:06 AM   Result Value Ref Range    Creatinine 0.67 0.51 - 0.95 mg/dL    GFR Estimate 88 >60 mL/min/1.73m2   Hemoglobin    Collection Time: 03/12/23 10:34 AM   Result Value Ref Range    Hemoglobin 12.2 11.7 - 15.7 g/dL   Extra Green Top (Lithium Heparin) Tube    Collection Time: 03/12/23 10:34 AM   Result Value Ref Range    Hold Specimen JIC    Hemoglobin    Collection Time: 03/13/23  5:13 AM   Result Value Ref Range    Hemoglobin 11.3 (L) 11.7 - 15.7 g/dL   Glucose    Collection Time: 03/13/23  7:47 AM   Result Value Ref Range    Glucose 127 (H) 70 - 99 mg/dL   Hemoglobin    Collection Time: 03/14/23  5:09 AM   Result Value Ref Range    Hemoglobin 11.5 (L) 11.7 - 15.7 g/dL   Glucose by meter    Collection Time: 03/14/23  6:04 AM   Result Value Ref Range    GLUCOSE BY METER POCT 119 (H) 70 - 99 mg/dL           Electronically signed by    Suzanne Wells MD

## 2023-03-16 ENCOUNTER — TRANSITIONAL CARE UNIT VISIT (OUTPATIENT)
Dept: GERIATRICS | Facility: CLINIC | Age: 83
End: 2023-03-16
Payer: COMMERCIAL

## 2023-03-16 VITALS
TEMPERATURE: 97.9 F | BODY MASS INDEX: 25.99 KG/M2 | HEIGHT: 65 IN | SYSTOLIC BLOOD PRESSURE: 158 MMHG | DIASTOLIC BLOOD PRESSURE: 68 MMHG | HEART RATE: 99 BPM | OXYGEN SATURATION: 98 % | WEIGHT: 156 LBS | RESPIRATION RATE: 16 BRPM

## 2023-03-16 DIAGNOSIS — K21.9 GASTROESOPHAGEAL REFLUX DISEASE, UNSPECIFIED WHETHER ESOPHAGITIS PRESENT: ICD-10-CM
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
## 2023-03-16 DIAGNOSIS — I10 BENIGN ESSENTIAL HYPERTENSION: ICD-10-CM

## 2023-03-16 DIAGNOSIS — E03.9 HYPOTHYROIDISM, UNSPECIFIED TYPE: ICD-10-CM

## 2023-03-16 DIAGNOSIS — S72.002A CLOSED FRACTURE OF NECK OF LEFT FEMUR, INITIAL ENCOUNTER (H): Primary | ICD-10-CM

## 2023-03-16 PROCEDURE — 99305 1ST NF CARE MODERATE MDM 35: CPT | Performed by: FAMILY MEDICINE

## 2023-03-16 NOTE — LETTER
3/16/2023        RE: Cristiane Birmingham  [address]  [place] [address]        [facility] GERIATRIC SERVICES       Patient Cristiane Birmingham  MRN: [id number]        Reason for Visit     Chief Complaint   Patient presents with     Hospital F/U       Code Status     CPR/Full code     Assessment     Status post left bipolar hemiarthroplasty  Left femoral neck fracture  History of a mechanical fall  Hypothyroidism  GERD  Hyperlipidemia  Generalized weakness    Plan     Pt is admitted to TCU for strengthening and rehab.  Pt admitted to TCU after undergoing left hip bipolar hemiarthroplasty surgery  Date of procedure- 3/11/23  Continue with incisional cares  WBAT  Follow-up with orthopedics as scheduled  Cont PT / OT for strengthening/ gait retraining  Pain management optimized  Tylenol scheduled 1 g every 8 hours  Continue narcotics prn-has Dilaudid as needed  Also given Zanaflex for muscle spasms  Advised aggressive icing  On Lovenox for dvt prophylaxis  Duration to be determined by orthopedics x 30 days  Tubigrip stockings recommended for management of swelling of the lower extremities     Recheck labs  htn controlled  gerd symptoms controlled  Continue with PT/OT-using a walker    History     Patient is a very pleasant 82 year old female who is admitted to TCU  Patient admitted post fall which was felt to be mechanical.  She was noted to have left femoral neck fracture and underwent a left bipolar hemiarthroplasty.  Postprocedure she did well and has been discharged to the TCU      Past Medical & Surgical History     PAST MEDICAL HISTORY:   Past Medical History:   Diagnosis Date     Benign essential hypertension 11/4/2020     Breast cyst 2014     Colon polyp      Degenerative arthritis 11/4/2020     Diverticulitis of colon 11/4/2020     Dyspnea on exertion 2/6/2020     Gastroesophageal reflux disease 11/4/2020     Hyperlipidemia 11/4/2020     Hypothyroidism 11/4/2020     Inverted nipple     hx of inverted  nipples     Palpitations 2/6/2020     PVC's (premature ventricular contractions) 2/6/2020     Senile incipient cataract 11/4/2020     Varicose veins of both lower extremities 11/4/2020      PAST SURGICAL HISTORY:   has a past surgical history that includes Breast Cyst Aspiration (Right, 2014); Biopsy breast (Left, 2009); Biopsy breast (Right, 2014); tubal ligation; Strip vein; and Open reduction internal fixation hip bipolar (Left, 3/11/2023).      Past Social History     Reviewed,  reports that she has quit smoking. She has never used smokeless tobacco.    Family History     Reviewed, and family history includes Brain Cancer in her maternal uncle; Breast Cancer in her cousin, cousin, and cousin; Breast Cancer (age of onset: 40.00) in her cousin; Cancer in her cousin, maternal uncle, and maternal uncle; Colon Cancer in her father, maternal grandfather, paternal uncle, and another family member; Colon Cancer (age of onset: 70.00) in her sister; Lung Cancer in her cousin, cousin, cousin, and cousin; Pancreatic Cancer in an other family member; Pancreatic Cancer (age of onset: 92.00) in her mother; Prostate Cancer in her paternal grandfather; Skin Cancer in her brother and brother; Stomach Cancer (age of onset: 50.00) in her cousin.    Medication List     Current Outpatient Medications   Medication     acetaminophen (TYLENOL) 325 MG tablet     amLODIPine (NORVASC) 10 MG tablet     ascorbic acid, vitamin C, (VITAMIN C) 1000 MG tablet     aspirin 81 MG EC tablet     atorvastatin (LIPITOR) 10 MG tablet     calcium carbonate (CALCIUM 600 ORAL)     enoxaparin ANTICOAGULANT (LOVENOX) 40 MG/0.4ML syringe     HYDROmorphone (DILAUDID) 2 MG tablet     hydrOXYzine (ATARAX) 25 MG tablet     levothyroxine (SYNTHROID, LEVOTHROID) 100 MCG tablet     magnesium 200 mg Tab     multivitamin-minerals-lutein (CENTRUM SILVER) tablet     omega 3-dha-epa-fish oil (FISH OIL) 1,000 mg (120 mg-180 mg) cap     omeprazole (PRILOSEC) 20 MG capsule  "    polyethylene glycol (MIRALAX) 17 g packet     senna-docusate (SENOKOT-S/PERICOLACE) 8.6-50 MG tablet     tiZANidine (ZANAFLEX) 2 MG tablet     No current facility-administered medications for this visit.          Allergies     Allergies   Allergen Reactions     Fosamax [Alendronic Acid] Unknown     Metronidazole Unknown     Sulfa Drugs Unknown       Review of Systems   A comprehensive review of 14 systems was done. Pertinent findings noted here and in history of present illness. All the rest negative.  Constitutional: Negative.  Negative for fever, chills, she has  activity change, appetite change and fatigue.   HENT: Negative for congestion and facial swelling.    Eyes: Negative for photophobia, redness and visual disturbance.   Respiratory: Negative for cough and chest tightness.    Cardiovascular: Negative for chest pain, palpitations and leg swelling.   Gastrointestinal: Negative for nausea, diarrhea, constipation, blood in stool and abdominal distention.   Genitourinary: Negative.    Musculoskeletal: has pain in hip but controlled   Skin: Negative.    Neurological: Negative for dizziness, tremors, syncope, weakness, light-headedness and headaches.   Hematological: Does not bruise/bleed easily.   Psychiatric/Behavioral: Negative.        Physical Exam   BP (!) 158/68   Pulse 99   Temp 97.9  F (36.6  C)   Resp 16   Ht 1.651 m (5' 5\")   Wt 70.8 kg (156 lb)   SpO2 98%   BMI 25.96 kg/m       Constitutional: Oriented to person, place, and time and appears well-developed.   HEENT:  Normocephalic and atraumatic.  Eyes: Conjunctivae and EOM are normal. Pupils are equal, round, and reactive to light. No discharge.  No scleral icterus. Nose normal. Mouth/Throat: Oropharynx is clear and moist. No oropharyngeal exudate.    NECK: Normal range of motion. Neck supple. No JVD present. No tracheal deviation present. No thyromegaly present.   CARDIOVASCULAR: Normal rate, regular rhythm and intact distal pulses.  Exam " reveals no gallop and no friction rub.  Systolic murmur present.  PULMONARY: Effort normal and breath sounds normal. No respiratory distress.No Wheezing or rales.  ABDOMEN: Soft. Bowel sounds are normal. No distension and no mass.  There is no tenderness. There is no rebound and no guarding. No HSM.  MUSCULOSKELETAL: Normal range of motion. Mild kyphosis, no tenderness.  Left hip incision is intact and with dressing noted  LYMPH NODES: Has no cervical, supraclavicular, axillary and groin adenopathy.   NEUROLOGICAL: Alert and oriented to person, place, and time. No cranial nerve deficit.  Normal muscle tone. Coordination normal.   GENITOURINARY: Deferred exam.  SKIN: Skin is warm and dry. No rash noted. No erythema. No pallor.   EXTREMITIES: No cyanosis, no clubbing, no edema. No Deformity.  PSYCHIATRIC: Normal mood, affect and behavior.      Lab Results     Recent Results (from the past 240 hour(s))   Basic metabolic panel    Collection Time: 03/11/23  2:41 PM   Result Value Ref Range    Sodium 139 136 - 145 mmol/L    Potassium 3.7 3.4 - 5.3 mmol/L    Chloride 104 98 - 107 mmol/L    Carbon Dioxide (CO2) 23 22 - 29 mmol/L    Anion Gap 12 7 - 15 mmol/L    Urea Nitrogen 13.1 8.0 - 23.0 mg/dL    Creatinine 0.57 0.51 - 0.95 mg/dL    Calcium 9.2 8.8 - 10.2 mg/dL    Glucose 142 (H) 70 - 99 mg/dL    GFR Estimate >90 >60 mL/min/1.73m2   INR    Collection Time: 03/11/23  2:41 PM   Result Value Ref Range    INR 0.94 0.85 - 1.15   CBC with platelets and differential    Collection Time: 03/11/23  2:41 PM   Result Value Ref Range    WBC Count 13.4 (H) 4.0 - 11.0 10e3/uL    RBC Count 4.70 3.80 - 5.20 10e6/uL    Hemoglobin 14.9 11.7 - 15.7 g/dL    Hematocrit 43.6 35.0 - 47.0 %    MCV 93 78 - 100 fL    MCH 31.7 26.5 - 33.0 pg    MCHC 34.2 31.5 - 36.5 g/dL    RDW 12.9 10.0 - 15.0 %    Platelet Count 235 150 - 450 10e3/uL    % Neutrophils 82 %    % Lymphocytes 11 %    % Monocytes 6 %    % Eosinophils 0 %    % Basophils 0 %    %  Immature Granulocytes 1 %    NRBCs per 100 WBC 0 <1 /100    Absolute Neutrophils 11.1 (H) 1.6 - 8.3 10e3/uL    Absolute Lymphocytes 1.5 0.8 - 5.3 10e3/uL    Absolute Monocytes 0.8 0.0 - 1.3 10e3/uL    Absolute Eosinophils 0.0 0.0 - 0.7 10e3/uL    Absolute Basophils 0.0 0.0 - 0.2 10e3/uL    Absolute Immature Granulocytes 0.1 <=0.4 10e3/uL    Absolute NRBCs 0.0 10e3/uL   Creatinine    Collection Time: 03/12/23  8:06 AM   Result Value Ref Range    Creatinine 0.67 0.51 - 0.95 mg/dL    GFR Estimate 88 >60 mL/min/1.73m2   Hemoglobin    Collection Time: 03/12/23 10:34 AM   Result Value Ref Range    Hemoglobin 12.2 11.7 - 15.7 g/dL   Extra Green Top (Lithium Heparin) Tube    Collection Time: 03/12/23 10:34 AM   Result Value Ref Range    Hold Specimen JIC    Hemoglobin    Collection Time: 03/13/23  5:13 AM   Result Value Ref Range    Hemoglobin 11.3 (L) 11.7 - 15.7 g/dL   Glucose    Collection Time: 03/13/23  7:47 AM   Result Value Ref Range    Glucose 127 (H) 70 - 99 mg/dL   Hemoglobin    Collection Time: 03/14/23  5:09 AM   Result Value Ref Range    Hemoglobin 11.5 (L) 11.7 - 15.7 g/dL   Glucose by meter    Collection Time: 03/14/23  6:04 AM   Result Value Ref Range    GLUCOSE BY METER POCT 119 (H) 70 - 99 mg/dL           Electronically signed by    Suzanne Wells MD                             Sincerely,        DERIAN Reyes

## 2023-03-19 NOTE — PROGRESS NOTES
[facility] GERIATRIC SERVICES  Chief Complaint   Patient presents with     [facility] F/U     [place] [facility] Medical Record Number:  [id number]  Place of Service where encounter took place:  No question data found.  Code Status:  Full Code     HISTORY:      HPI:  Cristiane Birmingham  is 82 year old (1940) undergoing physical and occupational therapy. She has been admitted on 3/11/2023 post fall. [facility] [place] today this am when she slipped on ice and fell on the ground following which she had severe left hip pain. Patient subsequently came to the ER for further evaluation and management. She was found to have Fracture through the left femoral neck without evidence for intertrochanteric extension.  She is status post bipolar left hemiarthroplasty posterior approach.    Today she is seen to review vital signs, labs, routine visit and to establish care.  She denied chest pain shortness of breath cough congestion constipation or diarrhea.  Her pain is controlled with current medications.  Left hip with Aquacel dressing and she will follow-up with orthopedics on Friday.  She continues on enoxaparin until 4/14/2023 and staff to do injection teaching.  Labs reviewed hemoglobin on 3/14/2023 was 11.5 BMP within normal limits    ALLERGIES:Fosamax [alendronic acid], Metronidazole, and Sulfa drugs    PAST MEDICAL HISTORY:   Past Medical History:   Diagnosis Date     Benign essential hypertension 11/4/2020     Breast cyst 2014     Colon polyp      Degenerative arthritis 11/4/2020     Diverticulitis of colon 11/4/2020     Dyspnea on exertion 2/6/2020     Gastroesophageal reflux disease 11/4/2020     Hyperlipidemia 11/4/2020     Hypothyroidism 11/4/2020     Inverted nipple     hx of inverted nipples     Palpitations 2/6/2020     PVC's (premature ventricular contractions) 2/6/2020     Senile incipient cataract 11/4/2020     Varicose veins of both lower extremities 11/4/2020       PAST SURGICAL HISTORY:   has a past  surgical history that includes Breast Cyst Aspiration (Right, 2014); Biopsy breast (Left, 2009); Biopsy breast (Right, 2014); tubal ligation; Strip vein; and Open reduction internal fixation hip bipolar (Left, 3/11/2023).    FAMILY HISTORY: family history includes Brain Cancer in her maternal uncle; Breast Cancer in her cousin, cousin, and cousin; Breast Cancer (age of onset: 40.00) in her cousin; Cancer in her cousin, maternal uncle, and maternal uncle; Colon Cancer in her father, maternal grandfather, paternal uncle, and another family member; Colon Cancer (age of onset: 70.00) in her sister; Lung Cancer in her cousin, cousin, cousin, and cousin; Pancreatic Cancer in an other family member; Pancreatic Cancer (age of onset: 92.00) in her mother; Prostate Cancer in her paternal grandfather; Skin Cancer in her brother and brother; Stomach Cancer (age of onset: 50.00) in her cousin.    SOCIAL HISTORY:  reports that she has quit smoking. She has never used smokeless tobacco.    ROS:  Constitutional: Negative for activity change, appetite change, fatigue and fever.   HENT: Negative for congestion.    Respiratory: Negative for cough, shortness of breath and wheezing.    Cardiovascular: Negative for chest pain and leg swelling.   Gastrointestinal: Negative for abdominal distention, abdominal pain, constipation, diarrhea and nausea.   Genitourinary: Negative for dysuria.   Musculoskeletal: Positive for arthralgia. Negative for back pain.   Skin: Negative for color change and wound.   Neurological: Negative for dizziness.   Psychiatric/Behavioral: Negative for agitation, behavioral problems and confusion.     Physical Exam:  Constitutional:       Appearance: Patient is well-developed.   HENT:      Head: Normocephalic.   Eyes:      Conjunctiva/sclera: Conjunctivae normal.   Neck:      Musculoskeletal: Normal range of motion.   Cardiovascular:      Rate and Rhythm: Normal rate and regular rhythm.      Heart sounds: Normal  heart sounds. No murmur.   Pulmonary:      Effort: No respiratory distress.      Breath sounds: Normal breath sounds. No wheezing or rales.   Abdominal:      General: Bowel sounds are normal. There is no distension.      Palpations: Abdomen is soft.      Tenderness: There is no abdominal tenderness.   Musculoskeletal:       Normal range of motion.     Skin:General:        Skin is warm.  Aquacel dressing left hip  Neurological:         Mental Status: Patient is alert and oriented to person, place, and time.   Psychiatric:         Behavior: Behavior normal.     Vitals:/70   Pulse 78   Temp 97.7  F (36.5  C)   Resp 16   Wt 67.5 kg (148 lb 12.8 oz)   SpO2 97%   BMI 24.76 kg/m   and Body mass index is 24.76 kg/m .    Lab/Diagnostic data:   Recent Results (from the past 240 hour(s))   Basic metabolic panel    Collection Time: 03/11/23  2:41 PM   Result Value Ref Range    Sodium 139 136 - 145 mmol/L    Potassium 3.7 3.4 - 5.3 mmol/L    Chloride 104 98 - 107 mmol/L    Carbon Dioxide (CO2) 23 22 - 29 mmol/L    Anion Gap 12 7 - 15 mmol/L    Urea Nitrogen 13.1 8.0 - 23.0 mg/dL    Creatinine 0.57 0.51 - 0.95 mg/dL    Calcium 9.2 8.8 - 10.2 mg/dL    Glucose 142 (H) 70 - 99 mg/dL    GFR Estimate >90 >60 mL/min/1.73m2   INR    Collection Time: 03/11/23  2:41 PM   Result Value Ref Range    INR 0.94 0.85 - 1.15   CBC with platelets and differential    Collection Time: 03/11/23  2:41 PM   Result Value Ref Range    WBC Count 13.4 (H) 4.0 - 11.0 10e3/uL    RBC Count 4.70 3.80 - 5.20 10e6/uL    Hemoglobin 14.9 11.7 - 15.7 g/dL    Hematocrit 43.6 35.0 - 47.0 %    MCV 93 78 - 100 fL    MCH 31.7 26.5 - 33.0 pg    MCHC 34.2 31.5 - 36.5 g/dL    RDW 12.9 10.0 - 15.0 %    Platelet Count 235 150 - 450 10e3/uL    % Neutrophils 82 %    % Lymphocytes 11 %    % Monocytes 6 %    % Eosinophils 0 %    % Basophils 0 %    % Immature Granulocytes 1 %    NRBCs per 100 WBC 0 <1 /100    Absolute Neutrophils 11.1 (H) 1.6 - 8.3 10e3/uL    Absolute  Lymphocytes 1.5 0.8 - 5.3 10e3/uL    Absolute Monocytes 0.8 0.0 - 1.3 10e3/uL    Absolute Eosinophils 0.0 0.0 - 0.7 10e3/uL    Absolute Basophils 0.0 0.0 - 0.2 10e3/uL    Absolute Immature Granulocytes 0.1 <=0.4 10e3/uL    Absolute NRBCs 0.0 10e3/uL   Creatinine    Collection Time: 03/12/23  8:06 AM   Result Value Ref Range    Creatinine 0.67 0.51 - 0.95 mg/dL    GFR Estimate 88 >60 mL/min/1.73m2   Hemoglobin    Collection Time: 03/12/23 10:34 AM   Result Value Ref Range    Hemoglobin 12.2 11.7 - 15.7 g/dL   Extra Green Top (Lithium Heparin) Tube    Collection Time: 03/12/23 10:34 AM   Result Value Ref Range    Hold Specimen JIC    Hemoglobin    Collection Time: 03/13/23  5:13 AM   Result Value Ref Range    Hemoglobin 11.3 (L) 11.7 - 15.7 g/dL   Glucose    Collection Time: 03/13/23  7:47 AM   Result Value Ref Range    Glucose 127 (H) 70 - 99 mg/dL   Hemoglobin    Collection Time: 03/14/23  5:09 AM   Result Value Ref Range    Hemoglobin 11.5 (L) 11.7 - 15.7 g/dL   Glucose by meter    Collection Time: 03/14/23  6:04 AM   Result Value Ref Range    GLUCOSE BY METER POCT 119 (H) 70 - 99 mg/dL       MEDICATIONS:     Review of your medicines          Accurate as of March 19, 2023  1:27 PM. If you have any questions, ask your nurse or doctor.            CONTINUE these medicines which have NOT CHANGED      Dose / Directions   acetaminophen 325 MG tablet  Commonly known as: TYLENOL  Used for: Closed fracture of neck of left femur, initial encounter (H), Status post hip hemiarthroplasty      Dose: 975 mg  Take 3 tablets (975 mg) by mouth every 6 hours  Quantity: 100 tablet  Refills: 0     amLODIPine 10 MG tablet  Commonly known as: NORVASC  Used for: Closed intertrochanteric fracture of hip, left, initial encounter (H)      Dose: 10 mg  Take 1 tablet (10 mg) by mouth daily  Refills: 0     ascorbic acid 1000 MG Tabs tablet      Dose: 1,000 mg  Take 1,000 mg by mouth four times a week Takes KELLY Kendrick W, F  Refills: 0     aspirin 81  MG EC tablet  Commonly known as: ASA      Dose: 81 mg  Take 81 mg by mouth four times a week Takes Kendrick, M, W, F  Refills: 0     atorvastatin 10 MG tablet  Commonly known as: LIPITOR      Dose: 5 mg  [ATORVASTATIN (LIPITOR) 10 MG TABLET] Take 5 mg by mouth at bedtime.  Refills: 0     CALCIUM 600 PO      Dose: 600 mg  Take 600 mg by mouth four times a week Takes Kendrick, M, W, F  Refills: 0     enoxaparin ANTICOAGULANT 40 MG/0.4ML syringe  Commonly known as: LOVENOX  Used for: Closed fracture of neck of left femur, initial encounter (H), Status post hip hemiarthroplasty      Dose: 40 mg  Inject 0.4 mLs (40 mg) Subcutaneous every 24 hours for 30 days  Quantity: 12 mL  Refills: 0     HYDROmorphone 2 MG tablet  Commonly known as: DILAUDID  Used for: Closed fracture of neck of left femur, initial encounter (H), Status post hip hemiarthroplasty      Dose: 2-4 mg  Take 1-2 tablets (2-4 mg) by mouth every 4 hours as needed for breakthrough pain (2 mg if pain rating 2-6, 4 mg if pain rating 7-10. Hold if sedated.)  Quantity: 25 tablet  Refills: 0     hydrOXYzine 25 MG tablet  Commonly known as: ATARAX  Used for: Closed fracture of neck of left femur, initial encounter (H), Status post hip hemiarthroplasty      Dose: 25 mg  Take 1 tablet (25 mg) by mouth every 6 hours as needed for other (adjuvant pain)  Quantity: 30 tablet  Refills: 0     levothyroxine 100 MCG tablet  Commonly known as: SYNTHROID/LEVOTHROID      Dose: 100 mcg  Take 100 mcg by mouth four times a week Takes Kendrick, M, W, F  Refills: 0     magnesium 200 MG Tabs      Dose: 200 mg  Take 200 mg by mouth four times a week Takes Kendrick, M, W, F  Refills: 0     Maximum EPA 1000 MG Caps      Dose: 1 capsule  Take 1 capsule by mouth four times a week Takes Kendrick, M, W, F  Refills: 0     multivitamin w/minerals tablet      Dose: 1 tablet  Take 1 tablet by mouth four times a week Takes Kendrick, M, W, F  Refills: 0     omeprazole 20 MG DR capsule  Commonly known as: priLOSEC      Dose: 20  mg  Take 20 mg by mouth four times a week Takes Kendrick, M, W, F  Refills: 0     polyethylene glycol 17 g packet  Commonly known as: MIRALAX  Used for: Closed intertrochanteric fracture of hip, left, initial encounter (H)      Dose: 17 g  Take 17 g by mouth daily  Refills: 0     senna-docusate 8.6-50 MG tablet  Commonly known as: SENOKOT-S/PERICOLACE  Used for: Closed fracture of neck of left femur, initial encounter (H), Status post hip hemiarthroplasty      Dose: 1 tablet  Take 1 tablet by mouth 2 times daily as needed for constipation  Quantity: 60 tablet  Refills: 0     tiZANidine 2 MG tablet  Commonly known as: ZANAFLEX  Used for: Closed fracture of neck of left femur, initial encounter (H), Status post hip hemiarthroplasty      Dose: 2 mg  Take 1 tablet (2 mg) by mouth 3 times daily as needed for muscle spasms  Quantity: 30 tablet  Refills: 0            ASSESSMENT/PLAN  Encounter Diagnoses   Name Primary?     Hypothyroidism, unspecified type Yes     Pain management      Physical deconditioning      Closed fracture of neck of left femur, initial encounter (H)      Hypothyroidism on levothyroxine TSH on 10/12/2022 was 1.72    Pain management as needed Dilaudid and scheduled Tylenol, Zanaflex 2 mg every 8 hours as needed, PRN Hydroxyzine     Left femur fracture surgical repair follow-up with orthopedics pain management    Physical deconditioning PT OT    GERD on omeprazole    Anticoagulation management continue enoxaparin until 4/14/2023    HDL on baby aspirin and atorvastatin    Hypertension on Norvasc    Electronically signed by: Sarah House CNP

## 2023-03-20 ENCOUNTER — TRANSITIONAL CARE UNIT VISIT (OUTPATIENT)
Dept: GERIATRICS | Facility: CLINIC | Age: 83
End: 2023-03-20
Payer: COMMERCIAL

## 2023-03-20 VITALS
SYSTOLIC BLOOD PRESSURE: 139 MMHG | WEIGHT: 148.8 LBS | HEART RATE: 78 BPM | RESPIRATION RATE: 16 BRPM | DIASTOLIC BLOOD PRESSURE: 70 MMHG | BODY MASS INDEX: 24.79 KG/M2 | OXYGEN SATURATION: 97 % | TEMPERATURE: 97.7 F | HEIGHT: 65 IN

## 2023-03-20 DIAGNOSIS — R52 PAIN MANAGEMENT: Primary | ICD-10-CM

## 2023-03-20 DIAGNOSIS — S72.002A CLOSED FRACTURE OF NECK OF LEFT FEMUR, INITIAL ENCOUNTER (H): ICD-10-CM

## 2023-03-20 DIAGNOSIS — R53.81 PHYSICAL DECONDITIONING: ICD-10-CM

## 2023-03-20 PROCEDURE — 99310 SBSQ NF CARE HIGH MDM 45: CPT | Performed by: NURSE PRACTITIONER

## 2023-03-20 NOTE — LETTER
3/20/2023        RE: Cristiane Birmingham  [address]  [place] [address]        [facility] GERIATRIC SERVICES  Chief Complaint   Patient presents with     Hospital F/U     [place] Medical Record Number:  [id number]  Place of Service where encounter took place:  No question data found.  Code Status:  Full Code     HISTORY:      HPI:  Cristiane Birmingham  is 82 year old (1940) undergoing physical and occupational therapy. She has been admitted on 3/11/2023 post fall. Patient was walking into the [employer] today this am when she slipped on ice and fell on the ground following which she had severe left hip pain. Patient subsequently came to the ER for further evaluation and management. She was found to have Fracture through the left femoral neck without evidence for intertrochanteric extension.  She is status post bipolar left hemiarthroplasty posterior approach.    Today she is seen to review vital signs, labs, routine visit and to establish care.  She denied chest pain shortness of breath cough congestion constipation or diarrhea.  Her pain is controlled with current medications.  Left hip with Aquacel dressing and she will follow-up with orthopedics on Friday.  She continues on enoxaparin until 4/14/2023 and staff to do injection teaching.  Labs reviewed hemoglobin on 3/14/2023 was 11.5 BMP within normal limits    ALLERGIES:Fosamax [alendronic acid], Metronidazole, and Sulfa drugs    PAST MEDICAL HISTORY:   Past Medical History:   Diagnosis Date     Benign essential hypertension 11/4/2020     Breast cyst 2014     Colon polyp      Degenerative arthritis 11/4/2020     Diverticulitis of colon 11/4/2020     Dyspnea on exertion 2/6/2020     Gastroesophageal reflux disease 11/4/2020     Hyperlipidemia 11/4/2020     Hypothyroidism 11/4/2020     Inverted nipple     hx of inverted nipples     Palpitations 2/6/2020     PVC's (premature ventricular contractions) 2/6/2020     Senile incipient cataract 11/4/2020      Varicose veins of both lower extremities 11/4/2020       PAST SURGICAL HISTORY:   has a past surgical history that includes Breast Cyst Aspiration (Right, 2014); Biopsy breast (Left, 2009); Biopsy breast (Right, 2014); tubal ligation; Strip vein; and Open reduction internal fixation hip bipolar (Left, 3/11/2023).    FAMILY HISTORY: family history includes Brain Cancer in her maternal uncle; Breast Cancer in her cousin, cousin, and cousin; Breast Cancer (age of onset: 40.00) in her cousin; Cancer in her cousin, maternal uncle, and maternal uncle; Colon Cancer in her father, maternal grandfather, paternal uncle, and another family member; Colon Cancer (age of onset: 70.00) in her sister; Lung Cancer in her cousin, cousin, cousin, and cousin; Pancreatic Cancer in an other family member; Pancreatic Cancer (age of onset: 92.00) in her mother; Prostate Cancer in her paternal grandfather; Skin Cancer in her brother and brother; Stomach Cancer (age of onset: 50.00) in her cousin.    SOCIAL HISTORY:  reports that she has quit smoking. She has never used smokeless tobacco.    ROS:  Constitutional: Negative for activity change, appetite change, fatigue and fever.   HENT: Negative for congestion.    Respiratory: Negative for cough, shortness of breath and wheezing.    Cardiovascular: Negative for chest pain and leg swelling.   Gastrointestinal: Negative for abdominal distention, abdominal pain, constipation, diarrhea and nausea.   Genitourinary: Negative for dysuria.   Musculoskeletal: Positive for arthralgia. Negative for back pain.   Skin: Negative for color change and wound.   Neurological: Negative for dizziness.   Psychiatric/Behavioral: Negative for agitation, behavioral problems and confusion.     Physical Exam:  Constitutional:       Appearance: Patient is well-developed.   HENT:      Head: Normocephalic.   Eyes:      Conjunctiva/sclera: Conjunctivae normal.   Neck:      Musculoskeletal: Normal range of motion.    Cardiovascular:      Rate and Rhythm: Normal rate and regular rhythm.      Heart sounds: Normal heart sounds. No murmur.   Pulmonary:      Effort: No respiratory distress.      Breath sounds: Normal breath sounds. No wheezing or rales.   Abdominal:      General: Bowel sounds are normal. There is no distension.      Palpations: Abdomen is soft.      Tenderness: There is no abdominal tenderness.   Musculoskeletal:       Normal range of motion.     Skin:General:        Skin is warm.  Aquacel dressing left hip  Neurological:         Mental Status: Patient is alert and oriented to person, place, and time.   Psychiatric:         Behavior: Behavior normal.     Vitals:/70   Pulse 78   Temp 97.7  F (36.5  C)   Resp 16   Wt 67.5 kg (148 lb 12.8 oz)   SpO2 97%   BMI 24.76 kg/m   and Body mass index is 24.76 kg/m .    Lab/Diagnostic data:   Recent Results (from the past 240 hour(s))   Basic metabolic panel    Collection Time: 03/11/23  2:41 PM   Result Value Ref Range    Sodium 139 136 - 145 mmol/L    Potassium 3.7 3.4 - 5.3 mmol/L    Chloride 104 98 - 107 mmol/L    Carbon Dioxide (CO2) 23 22 - 29 mmol/L    Anion Gap 12 7 - 15 mmol/L    Urea Nitrogen 13.1 8.0 - 23.0 mg/dL    Creatinine 0.57 0.51 - 0.95 mg/dL    Calcium 9.2 8.8 - 10.2 mg/dL    Glucose 142 (H) 70 - 99 mg/dL    GFR Estimate >90 >60 mL/min/1.73m2   INR    Collection Time: 03/11/23  2:41 PM   Result Value Ref Range    INR 0.94 0.85 - 1.15   CBC with platelets and differential    Collection Time: 03/11/23  2:41 PM   Result Value Ref Range    WBC Count 13.4 (H) 4.0 - 11.0 10e3/uL    RBC Count 4.70 3.80 - 5.20 10e6/uL    Hemoglobin 14.9 11.7 - 15.7 g/dL    Hematocrit 43.6 35.0 - 47.0 %    MCV 93 78 - 100 fL    MCH 31.7 26.5 - 33.0 pg    MCHC 34.2 31.5 - 36.5 g/dL    RDW 12.9 10.0 - 15.0 %    Platelet Count 235 150 - 450 10e3/uL    % Neutrophils 82 %    % Lymphocytes 11 %    % Monocytes 6 %    % Eosinophils 0 %    % Basophils 0 %    % Immature Granulocytes  1 %    NRBCs per 100 WBC 0 <1 /100    Absolute Neutrophils 11.1 (H) 1.6 - 8.3 10e3/uL    Absolute Lymphocytes 1.5 0.8 - 5.3 10e3/uL    Absolute Monocytes 0.8 0.0 - 1.3 10e3/uL    Absolute Eosinophils 0.0 0.0 - 0.7 10e3/uL    Absolute Basophils 0.0 0.0 - 0.2 10e3/uL    Absolute Immature Granulocytes 0.1 <=0.4 10e3/uL    Absolute NRBCs 0.0 10e3/uL   Creatinine    Collection Time: 03/12/23  8:06 AM   Result Value Ref Range    Creatinine 0.67 0.51 - 0.95 mg/dL    GFR Estimate 88 >60 mL/min/1.73m2   Hemoglobin    Collection Time: 03/12/23 10:34 AM   Result Value Ref Range    Hemoglobin 12.2 11.7 - 15.7 g/dL   Extra Green Top (Lithium Heparin) Tube    Collection Time: 03/12/23 10:34 AM   Result Value Ref Range    Hold Specimen JIC    Hemoglobin    Collection Time: 03/13/23  5:13 AM   Result Value Ref Range    Hemoglobin 11.3 (L) 11.7 - 15.7 g/dL   Glucose    Collection Time: 03/13/23  7:47 AM   Result Value Ref Range    Glucose 127 (H) 70 - 99 mg/dL   Hemoglobin    Collection Time: 03/14/23  5:09 AM   Result Value Ref Range    Hemoglobin 11.5 (L) 11.7 - 15.7 g/dL   Glucose by meter    Collection Time: 03/14/23  6:04 AM   Result Value Ref Range    GLUCOSE BY METER POCT 119 (H) 70 - 99 mg/dL       MEDICATIONS:     Review of your medicines          Accurate as of March 19, 2023  1:27 PM. If you have any questions, ask your nurse or doctor.            CONTINUE these medicines which have NOT CHANGED      Dose / Directions   acetaminophen 325 MG tablet  Commonly known as: TYLENOL  Used for: Closed fracture of neck of left femur, initial encounter (H), Status post hip hemiarthroplasty      Dose: 975 mg  Take 3 tablets (975 mg) by mouth every 6 hours  Quantity: 100 tablet  Refills: 0     amLODIPine 10 MG tablet  Commonly known as: NORVASC  Used for: Closed intertrochanteric fracture of hip, left, initial encounter (H)      Dose: 10 mg  Take 1 tablet (10 mg) by mouth daily  Refills: 0     ascorbic acid 1000 MG Tabs tablet       Dose: 1,000 mg  Take 1,000 mg by mouth four times a week Takes Kendrick, M, W, F  Refills: 0     aspirin 81 MG EC tablet  Commonly known as: ASA      Dose: 81 mg  Take 81 mg by mouth four times a week Takes Kendrick, M, W, F  Refills: 0     atorvastatin 10 MG tablet  Commonly known as: LIPITOR      Dose: 5 mg  [ATORVASTATIN (LIPITOR) 10 MG TABLET] Take 5 mg by mouth at bedtime.  Refills: 0     CALCIUM 600 PO      Dose: 600 mg  Take 600 mg by mouth four times a week Takes Kendrick, M, W, F  Refills: 0     enoxaparin ANTICOAGULANT 40 MG/0.4ML syringe  Commonly known as: LOVENOX  Used for: Closed fracture of neck of left femur, initial encounter (H), Status post hip hemiarthroplasty      Dose: 40 mg  Inject 0.4 mLs (40 mg) Subcutaneous every 24 hours for 30 days  Quantity: 12 mL  Refills: 0     HYDROmorphone 2 MG tablet  Commonly known as: DILAUDID  Used for: Closed fracture of neck of left femur, initial encounter (H), Status post hip hemiarthroplasty      Dose: 2-4 mg  Take 1-2 tablets (2-4 mg) by mouth every 4 hours as needed for breakthrough pain (2 mg if pain rating 2-6, 4 mg if pain rating 7-10. Hold if sedated.)  Quantity: 25 tablet  Refills: 0     hydrOXYzine 25 MG tablet  Commonly known as: ATARAX  Used for: Closed fracture of neck of left femur, initial encounter (H), Status post hip hemiarthroplasty      Dose: 25 mg  Take 1 tablet (25 mg) by mouth every 6 hours as needed for other (adjuvant pain)  Quantity: 30 tablet  Refills: 0     levothyroxine 100 MCG tablet  Commonly known as: SYNTHROID/LEVOTHROID      Dose: 100 mcg  Take 100 mcg by mouth four times a week Takes Kendrick, M, W, F  Refills: 0     magnesium 200 MG Tabs      Dose: 200 mg  Take 200 mg by mouth four times a week Takes Su, M, W, F  Refills: 0     Maximum EPA 1000 MG Caps      Dose: 1 capsule  Take 1 capsule by mouth four times a week Takes Kendrick, M, W, F  Refills: 0     multivitamin w/minerals tablet      Dose: 1 tablet  Take 1 tablet by mouth four times a week Takes  Mireille M, W, F  Refills: 0     omeprazole 20 MG DR capsule  Commonly known as: priLOSEC      Dose: 20 mg  Take 20 mg by mouth four times a week Takes Mireille, M, W, F  Refills: 0     polyethylene glycol 17 g packet  Commonly known as: MIRALAX  Used for: Closed intertrochanteric fracture of hip, left, initial encounter (H)      Dose: 17 g  Take 17 g by mouth daily  Refills: 0     senna-docusate 8.6-50 MG tablet  Commonly known as: SENOKOT-S/PERICOLACE  Used for: Closed fracture of neck of left femur, initial encounter (H), Status post hip hemiarthroplasty      Dose: 1 tablet  Take 1 tablet by mouth 2 times daily as needed for constipation  Quantity: 60 tablet  Refills: 0     tiZANidine 2 MG tablet  Commonly known as: ZANAFLEX  Used for: Closed fracture of neck of left femur, initial encounter (H), Status post hip hemiarthroplasty      Dose: 2 mg  Take 1 tablet (2 mg) by mouth 3 times daily as needed for muscle spasms  Quantity: 30 tablet  Refills: 0            ASSESSMENT/PLAN  Encounter Diagnoses   Name Primary?     Hypothyroidism, unspecified type Yes     Pain management      Physical deconditioning      Closed fracture of neck of left femur, initial encounter (H)      Hypothyroidism on levothyroxine TSH on 10/12/2022 was 1.72    Pain management as needed Dilaudid and scheduled Tylenol, Zanaflex 2 mg every 8 hours as needed, PRN Hydroxyzine     Left femur fracture surgical repair follow-up with orthopedics pain management    Physical deconditioning PT OT    GERD on omeprazole    Anticoagulation management continue enoxaparin until 4/14/2023    HDL on baby aspirin and atorvastatin    Hypertension on Norvasc    Electronically signed by: Sarah House CNP        Sincerely,        Sarah House CNP

## 2023-03-21 ENCOUNTER — TELEPHONE (OUTPATIENT)
Dept: GERIATRICS | Facility: CLINIC | Age: 83
End: 2023-03-21
Payer: COMMERCIAL

## 2023-03-21 NOTE — TELEPHONE ENCOUNTER
[facility]iatric[facility] Lab Note     Provider: Suzanne Wells MD  Facility: [facility] Type:  TCU    Allergies   Allergen Reactions     Fosamax [Alendronic Acid] Unknown     Metronidazole Unknown     Sulfa Drugs Unknown       Labs Reviewed by provider: Heme 2, BMP, Mg     Verbal Order/Direction given by Provider: Discontinue Magnesium oxide.      Provider giving Order:  Suzanne Wells MD    Verbal Order given to: Karen([phone])    Karlo Pacheco RN

## 2023-03-27 ENCOUNTER — DISCHARGE SUMMARY NURSING HOME (OUTPATIENT)
Dept: GERIATRICS | Facility: CLINIC | Age: 83
End: 2023-03-27
Payer: COMMERCIAL

## 2023-03-27 VITALS
WEIGHT: 148 LBS | RESPIRATION RATE: 20 BRPM | BODY MASS INDEX: 24.66 KG/M2 | TEMPERATURE: 97.7 F | HEIGHT: 65 IN | HEART RATE: 76 BPM | DIASTOLIC BLOOD PRESSURE: 74 MMHG | SYSTOLIC BLOOD PRESSURE: 125 MMHG | OXYGEN SATURATION: 98 %

## 2023-03-27 DIAGNOSIS — R52 PAIN MANAGEMENT: Primary | ICD-10-CM

## 2023-03-27 DIAGNOSIS — S72.002A CLOSED FRACTURE OF NECK OF LEFT FEMUR, INITIAL ENCOUNTER (H): ICD-10-CM

## 2023-03-27 DIAGNOSIS — R53.81 PHYSICAL DECONDITIONING: ICD-10-CM

## 2023-03-27 PROCEDURE — 99316 NF DSCHRG MGMT 30 MIN+: CPT | Performed by: NURSE PRACTITIONER

## 2023-03-27 NOTE — PROGRESS NOTES
[facility] GERIATRIC SERVICES  Chief Complaint   Patient presents with     Discharge Summary [facility] Medical Record Number:  [id number]  Place of Service where encounter took place:  [facility] ([facility]) [[id number]]  Code Status:  Full Code     HISTORY:      HPI:  Cristiane Birmingham  is 82 year old (1940) undergoing physical and occupational therapy. She has been admitted on 3/11/2023 post fall. Patient was walking into the [employer] today this am when she slipped on ice and fell on the ground following which she had severe left hip pain. Patient subsequently came to the ER for further evaluation and management. She was found to have Fracture through the left femoral neck without evidence for intertrochanteric extension.  She is status post bipolar left hemiarthroplasty posterior approach.    Today she is seen to review vital signs, labs, routine visit and a face-to-face for discharge.  She will discharge to home on 3/29/2023 with current medications and treatments.  She will have PT OT home health aide.  She denied chest pain shortness of breath cough congestion constipation or diarrhea.  Her pain is controlled with current medications.  Left hip incision clean dry and intact open to air.  She continues on enoxaparin until 4/14/2023 and she has been taught how to do the injections along with her  and feels comfortable.  Labs reviewed last hemoglobin 11.5    ALLERGIES:Fosamax [alendronic acid], Metronidazole, and Sulfa drugs    PAST MEDICAL HISTORY:   Past Medical History:   Diagnosis Date     Benign essential hypertension 11/4/2020     Breast cyst 2014     Colon polyp      Degenerative arthritis 11/4/2020     Diverticulitis of colon 11/4/2020     Dyspnea on exertion 2/6/2020     Gastroesophageal reflux disease 11/4/2020     Hyperlipidemia 11/4/2020     Hypothyroidism 11/4/2020     Inverted nipple     hx of inverted nipples     Palpitations 2/6/2020     PVC's (premature ventricular  contractions) 2/6/2020     Senile incipient cataract 11/4/2020     Varicose veins of both lower extremities 11/4/2020       PAST SURGICAL HISTORY:   has a past surgical history that includes Breast Cyst Aspiration (Right, 2014); Biopsy breast (Left, 2009); Biopsy breast (Right, 2014); tubal ligation; Strip vein; and Open reduction internal fixation hip bipolar (Left, 3/11/2023).    FAMILY HISTORY: family history includes Brain Cancer in her maternal uncle; Breast Cancer in her cousin, cousin, and cousin; Breast Cancer (age of onset: 40.00) in her cousin; Cancer in her cousin, maternal uncle, and maternal uncle; Colon Cancer in her father, maternal grandfather, paternal uncle, and another family member; Colon Cancer (age of onset: 70.00) in her sister; Lung Cancer in her cousin, cousin, cousin, and cousin; Pancreatic Cancer in an other family member; Pancreatic Cancer (age of onset: 92.00) in her mother; Prostate Cancer in her paternal grandfather; Skin Cancer in her brother and brother; Stomach Cancer (age of onset: 50.00) in her cousin.    SOCIAL HISTORY:  reports that she has quit smoking. She has never used smokeless tobacco.    ROS:  Constitutional: Negative for activity change, appetite change, fatigue and fever.   HENT: Negative for congestion.    Respiratory: Negative for cough, shortness of breath and wheezing.    Cardiovascular: Negative for chest pain and leg swelling.   Gastrointestinal: Negative for abdominal distention, abdominal pain, constipation, diarrhea and nausea.   Genitourinary: Negative for dysuria.   Musculoskeletal: Positive for arthralgia. Negative for back pain.   Skin: Negative for color change and wound.   Neurological: Negative for dizziness.   Psychiatric/Behavioral: Negative for agitation, behavioral problems and confusion.     Physical Exam:  Constitutional:       Appearance: Patient is well-developed.   HENT:      Head: Normocephalic.   Eyes:      Conjunctiva/sclera: Conjunctivae  "normal.   Neck:      Musculoskeletal: Normal range of motion.   Cardiovascular:      Rate and Rhythm: Normal rate and regular rhythm.      Heart sounds: Normal heart sounds. No murmur.   Pulmonary:      Effort: No respiratory distress.      Breath sounds: Normal breath sounds. No wheezing or rales.   Abdominal:      General: Bowel sounds are normal. There is no distension.      Palpations: Abdomen is soft.      Tenderness: There is no abdominal tenderness.   Musculoskeletal:       Normal range of motion.     Skin:General:        Skin is warm.  Left hip incision clean dry and intact open to air  Neurological:         Mental Status: Patient is alert and oriented to person, place, and time.   Psychiatric:         Behavior: Behavior normal.     Vitals:/74   Pulse 76   Temp 97.7  F (36.5  C)   Resp 20   Ht 1.651 m (5' 5\")   Wt 67.1 kg (148 lb)   SpO2 98%   BMI 24.63 kg/m   and Body mass index is 24.63 kg/m .    Lab/Diagnostic data:   No results found for this or any previous visit (from the past 240 hour(s)).    MEDICATIONS:     Review of your medicines          Accurate as of March 27, 2023 12:49 PM. If you have any questions, ask your nurse or doctor.            CONTINUE these medicines which have NOT CHANGED      Dose / Directions   acetaminophen 325 MG tablet  Commonly known as: TYLENOL  Used for: Closed fracture of neck of left femur, initial encounter (H), Status post hip hemiarthroplasty      Dose: 975 mg  Take 3 tablets (975 mg) by mouth every 6 hours  Quantity: 100 tablet  Refills: 0     amLODIPine 10 MG tablet  Commonly known as: NORVASC  Used for: Closed intertrochanteric fracture of hip, left, initial encounter (H)      Dose: 10 mg  Take 1 tablet (10 mg) by mouth daily  Refills: 0     ascorbic acid 1000 MG Tabs tablet      Dose: 1,000 mg  Take 1,000 mg by mouth four times a week Takes Kendrick, M, W, F  Refills: 0     aspirin 81 MG EC tablet  Commonly known as: ASA      Dose: 81 mg  Take 81 mg by mouth " four times a week Takes Kendrick, M, W, F  Refills: 0     atorvastatin 10 MG tablet  Commonly known as: LIPITOR      Dose: 5 mg  [ATORVASTATIN (LIPITOR) 10 MG TABLET] Take 5 mg by mouth at bedtime.  Refills: 0     CALCIUM 600 PO      Dose: 600 mg  Take 600 mg by mouth four times a week Takes Kendrick, M, W, F  Refills: 0     enoxaparin ANTICOAGULANT 40 MG/0.4ML syringe  Commonly known as: LOVENOX  Used for: Closed fracture of neck of left femur, initial encounter (H), Status post hip hemiarthroplasty      Dose: 40 mg  Inject 0.4 mLs (40 mg) Subcutaneous every 24 hours for 30 days  Quantity: 12 mL  Refills: 0     HYDROmorphone 2 MG tablet  Commonly known as: DILAUDID  Used for: Closed fracture of neck of left femur, initial encounter (H), Status post hip hemiarthroplasty      Dose: 2-4 mg  Take 1-2 tablets (2-4 mg) by mouth every 4 hours as needed for breakthrough pain (2 mg if pain rating 2-6, 4 mg if pain rating 7-10. Hold if sedated.)  Quantity: 25 tablet  Refills: 0     hydrOXYzine 25 MG tablet  Commonly known as: ATARAX  Used for: Closed fracture of neck of left femur, initial encounter (H), Status post hip hemiarthroplasty      Dose: 25 mg  Take 1 tablet (25 mg) by mouth every 6 hours as needed for other (adjuvant pain)  Quantity: 30 tablet  Refills: 0     levothyroxine 100 MCG tablet  Commonly known as: SYNTHROID/LEVOTHROID      Dose: 100 mcg  Take 100 mcg by mouth four times a week Takes Kendrick, M, W, F  Refills: 0     Maximum EPA 1000 MG Caps      Dose: 1 capsule  Take 1 capsule by mouth four times a week Takes Kendrick, M, W, F  Refills: 0     multivitamin w/minerals tablet      Dose: 1 tablet  Take 1 tablet by mouth four times a week Takes Kendrick, M, W, F  Refills: 0     omeprazole 20 MG DR capsule  Commonly known as: priLOSEC      Dose: 20 mg  Take 20 mg by mouth four times a week Takes Kendrick, M, W, F  Refills: 0     polyethylene glycol 17 g packet  Commonly known as: MIRALAX  Used for: Closed intertrochanteric fracture of hip, left,  initial encounter (H)      Dose: 17 g  Take 17 g by mouth daily  Refills: 0     senna-docusate 8.6-50 MG tablet  Commonly known as: SENOKOT-S/PERICOLACE  Used for: Closed fracture of neck of left femur, initial encounter (H), Status post hip hemiarthroplasty      Dose: 1 tablet  Take 1 tablet by mouth 2 times daily as needed for constipation  Quantity: 60 tablet  Refills: 0     tiZANidine 2 MG tablet  Commonly known as: ZANAFLEX  Used for: Closed fracture of neck of left femur, initial encounter (H), Status post hip hemiarthroplasty      Dose: 2 mg  Take 1 tablet (2 mg) by mouth 3 times daily as needed for muscle spasms  Quantity: 30 tablet  Refills: 0        STOP taking    magnesium 200 MG Tabs  Stopped by: Sarah House CNP               ASSESSMENT/PLAN  Encounter Diagnoses   Name Primary?     Pain management Yes     Physical deconditioning      Closed fracture of neck of left femur, initial encounter (H)      Hypothyroidism on levothyroxine TSH on 10/12/2022 was 1.72    Pain management as needed Dilaudid and scheduled Tylenol, Zanaflex 2 mg every 8 hours as needed, PRN Hydroxyzine     Left femur fracture surgical repair follow-up with orthopedics pain management    Physical deconditioning she will have home care services PT OT home health aide    GERD on omeprazole    Anticoagulation management continue enoxaparin until 4/14/2023    HDL on baby aspirin and atorvastatin    Hypertension on Norvasc      DISCHARGE PLAN/FACE TO FACE:  I certify that services are/were furnished while this patient was under the care of a physician and that a physician or an allowed non-physician practitioner (NPP), had a face-to-face encounter that meets the physician face-to-face encounter requirements. The encounter was in whole, or in part, related to the primary reason for home health. The patient is confined to his/her home and needs intermittent skilled nursing, physical therapy, speech-language pathology, or the continued need  for occupational therapy. A plan of care has been established by a physician and is periodically reviewed by a physician.  Date of Face-to-Face Encounter: 3/27/23    I certify that, based on my findings, the following services are medically necessary home health services: PT OT home health aide    My clinical findings support the need for the above skilled services because: PT OT for continued strength and endurance, home health aide to assist with activities of daily living.    This patient is homebound because: She is deconditioned easily fatigued following recent left hemiarthroplasty and will continue home therapy    The patient is, or has been, under my care and I have initiated the establishment of the plan of care. This patient will be followed by a physician who will periodically review the plan of care.    Schedule follow up visit with primary care provider within 7 days to reestablish care.    Electronically signed by: Sarah House CNP

## 2023-03-27 NOTE — LETTER
3/27/2023        RE: Cristiane Birmingham  [address]  [place] [address]        [facility] GERIATRIC SERVICES  Chief Complaint   Patient presents with     Discharge Summary [facility] Medical Record Number:  [id number]  Place of Service where encounter took place:  [facility] ([facility]) [[id number]]  Code Status:  Full Code     HISTORY:      HPI:  Cristiane Birmingham  is 82 year old (1940) undergoing physical and occupational therapy. She has been admitted on 3/11/2023 post fall. Patient was walking into the [employer] today this am when she slipped on ice and fell on the ground following which she had severe left hip pain. Patient subsequently came to the ER for further evaluation and management. She was found to have Fracture through the left femoral neck without evidence for intertrochanteric extension.  She is status post bipolar left hemiarthroplasty posterior approach.    Today she is seen to review vital signs, labs, routine visit and a face-to-face for discharge.  She will discharge to home on 3/29/2023 with current medications and treatments.  She will have PT OT home health aide.  She denied chest pain shortness of breath cough congestion constipation or diarrhea.  Her pain is controlled with current medications.  Left hip incision clean dry and intact open to air.  She continues on enoxaparin until 4/14/2023 and she has been taught how to do the injections along with her  and feels comfortable.  Labs reviewed last hemoglobin 11.5    ALLERGIES:Fosamax [alendronic acid], Metronidazole, and Sulfa drugs    PAST MEDICAL HISTORY:   Past Medical History:   Diagnosis Date     Benign essential hypertension 11/4/2020     Breast cyst 2014     Colon polyp      Degenerative arthritis 11/4/2020     Diverticulitis of colon 11/4/2020     Dyspnea on exertion 2/6/2020     Gastroesophageal reflux disease 11/4/2020     Hyperlipidemia 11/4/2020     Hypothyroidism 11/4/2020     Inverted nipple      hx of inverted nipples     Palpitations 2/6/2020     PVC's (premature ventricular contractions) 2/6/2020     Senile incipient cataract 11/4/2020     Varicose veins of both lower extremities 11/4/2020       PAST SURGICAL HISTORY:   has a past surgical history that includes Breast Cyst Aspiration (Right, 2014); Biopsy breast (Left, 2009); Biopsy breast (Right, 2014); tubal ligation; Strip vein; and Open reduction internal fixation hip bipolar (Left, 3/11/2023).    FAMILY HISTORY: family history includes Brain Cancer in her maternal uncle; Breast Cancer in her cousin, cousin, and cousin; Breast Cancer (age of onset: 40.00) in her cousin; Cancer in her cousin, maternal uncle, and maternal uncle; Colon Cancer in her father, maternal grandfather, paternal uncle, and another family member; Colon Cancer (age of onset: 70.00) in her sister; Lung Cancer in her cousin, cousin, cousin, and cousin; Pancreatic Cancer in an other family member; Pancreatic Cancer (age of onset: 92.00) in her mother; Prostate Cancer in her paternal grandfather; Skin Cancer in her brother and brother; Stomach Cancer (age of onset: 50.00) in her cousin.    SOCIAL HISTORY:  reports that she has quit smoking. She has never used smokeless tobacco.    ROS:  Constitutional: Negative for activity change, appetite change, fatigue and fever.   HENT: Negative for congestion.    Respiratory: Negative for cough, shortness of breath and wheezing.    Cardiovascular: Negative for chest pain and leg swelling.   Gastrointestinal: Negative for abdominal distention, abdominal pain, constipation, diarrhea and nausea.   Genitourinary: Negative for dysuria.   Musculoskeletal: Positive for arthralgia. Negative for back pain.   Skin: Negative for color change and wound.   Neurological: Negative for dizziness.   Psychiatric/Behavioral: Negative for agitation, behavioral problems and confusion.     Physical Exam:  Constitutional:       Appearance: Patient is well-developed.  "  HENT:      Head: Normocephalic.   Eyes:      Conjunctiva/sclera: Conjunctivae normal.   Neck:      Musculoskeletal: Normal range of motion.   Cardiovascular:      Rate and Rhythm: Normal rate and regular rhythm.      Heart sounds: Normal heart sounds. No murmur.   Pulmonary:      Effort: No respiratory distress.      Breath sounds: Normal breath sounds. No wheezing or rales.   Abdominal:      General: Bowel sounds are normal. There is no distension.      Palpations: Abdomen is soft.      Tenderness: There is no abdominal tenderness.   Musculoskeletal:       Normal range of motion.     Skin:General:        Skin is warm.  Left hip incision clean dry and intact open to air  Neurological:         Mental Status: Patient is alert and oriented to person, place, and time.   Psychiatric:         Behavior: Behavior normal.     Vitals:/74   Pulse 76   Temp 97.7  F (36.5  C)   Resp 20   Ht 1.651 m (5' 5\")   Wt 67.1 kg (148 lb)   SpO2 98%   BMI 24.63 kg/m   and Body mass index is 24.63 kg/m .    Lab/Diagnostic data:   No results found for this or any previous visit (from the past 240 hour(s)).    MEDICATIONS:     Review of your medicines          Accurate as of March 27, 2023 12:49 PM. If you have any questions, ask your nurse or doctor.            CONTINUE these medicines which have NOT CHANGED      Dose / Directions   acetaminophen 325 MG tablet  Commonly known as: TYLENOL  Used for: Closed fracture of neck of left femur, initial encounter (H), Status post hip hemiarthroplasty      Dose: 975 mg  Take 3 tablets (975 mg) by mouth every 6 hours  Quantity: 100 tablet  Refills: 0     amLODIPine 10 MG tablet  Commonly known as: NORVASC  Used for: Closed intertrochanteric fracture of hip, left, initial encounter (H)      Dose: 10 mg  Take 1 tablet (10 mg) by mouth daily  Refills: 0     ascorbic acid 1000 MG Tabs tablet      Dose: 1,000 mg  Take 1,000 mg by mouth four times a week Takes KELLY Kendrick W, F  Refills: 0   "   aspirin 81 MG EC tablet  Commonly known as: ASA      Dose: 81 mg  Take 81 mg by mouth four times a week Takes Kendrick, M, W, F  Refills: 0     atorvastatin 10 MG tablet  Commonly known as: LIPITOR      Dose: 5 mg  [ATORVASTATIN (LIPITOR) 10 MG TABLET] Take 5 mg by mouth at bedtime.  Refills: 0     CALCIUM 600 PO      Dose: 600 mg  Take 600 mg by mouth four times a week Takes Kendrick, M, W, F  Refills: 0     enoxaparin ANTICOAGULANT 40 MG/0.4ML syringe  Commonly known as: LOVENOX  Used for: Closed fracture of neck of left femur, initial encounter (H), Status post hip hemiarthroplasty      Dose: 40 mg  Inject 0.4 mLs (40 mg) Subcutaneous every 24 hours for 30 days  Quantity: 12 mL  Refills: 0     HYDROmorphone 2 MG tablet  Commonly known as: DILAUDID  Used for: Closed fracture of neck of left femur, initial encounter (H), Status post hip hemiarthroplasty      Dose: 2-4 mg  Take 1-2 tablets (2-4 mg) by mouth every 4 hours as needed for breakthrough pain (2 mg if pain rating 2-6, 4 mg if pain rating 7-10. Hold if sedated.)  Quantity: 25 tablet  Refills: 0     hydrOXYzine 25 MG tablet  Commonly known as: ATARAX  Used for: Closed fracture of neck of left femur, initial encounter (H), Status post hip hemiarthroplasty      Dose: 25 mg  Take 1 tablet (25 mg) by mouth every 6 hours as needed for other (adjuvant pain)  Quantity: 30 tablet  Refills: 0     levothyroxine 100 MCG tablet  Commonly known as: SYNTHROID/LEVOTHROID      Dose: 100 mcg  Take 100 mcg by mouth four times a week Takes Kendrick, M, W, F  Refills: 0     Maximum EPA 1000 MG Caps      Dose: 1 capsule  Take 1 capsule by mouth four times a week Takes Kendrick, M, W, F  Refills: 0     multivitamin w/minerals tablet      Dose: 1 tablet  Take 1 tablet by mouth four times a week Takes Kendrick, M, W, F  Refills: 0     omeprazole 20 MG DR capsule  Commonly known as: priLOSEC      Dose: 20 mg  Take 20 mg by mouth four times a week Takes Kendrick, M, W, F  Refills: 0     polyethylene glycol 17 g  packet  Commonly known as: MIRALAX  Used for: Closed intertrochanteric fracture of hip, left, initial encounter (H)      Dose: 17 g  Take 17 g by mouth daily  Refills: 0     senna-docusate 8.6-50 MG tablet  Commonly known as: SENOKOT-S/PERICOLACE  Used for: Closed fracture of neck of left femur, initial encounter (H), Status post hip hemiarthroplasty      Dose: 1 tablet  Take 1 tablet by mouth 2 times daily as needed for constipation  Quantity: 60 tablet  Refills: 0     tiZANidine 2 MG tablet  Commonly known as: ZANAFLEX  Used for: Closed fracture of neck of left femur, initial encounter (H), Status post hip hemiarthroplasty      Dose: 2 mg  Take 1 tablet (2 mg) by mouth 3 times daily as needed for muscle spasms  Quantity: 30 tablet  Refills: 0        STOP taking    magnesium 200 MG Tabs  Stopped by: Sarah House CNP               ASSESSMENT/PLAN  Encounter Diagnoses   Name Primary?     Pain management Yes     Physical deconditioning      Closed fracture of neck of left femur, initial encounter (H)      Hypothyroidism on levothyroxine TSH on 10/12/2022 was 1.72    Pain management as needed Dilaudid and scheduled Tylenol, Zanaflex 2 mg every 8 hours as needed, PRN Hydroxyzine     Left femur fracture surgical repair follow-up with orthopedics pain management    Physical deconditioning she will have home care services PT OT home health aide    GERD on omeprazole    Anticoagulation management continue enoxaparin until 4/14/2023    HDL on baby aspirin and atorvastatin    Hypertension on Norvasc      DISCHARGE PLAN/FACE TO FACE:  I certify that services are/were furnished while this patient was under the care of a physician and that a physician or an allowed non-physician practitioner (NPP), had a face-to-face encounter that meets the physician face-to-face encounter requirements. The encounter was in whole, or in part, related to the primary reason for home health. The patient is confined to his/her home and needs  intermittent skilled nursing, physical therapy, speech-language pathology, or the continued need for occupational therapy. A plan of care has been established by a physician and is periodically reviewed by a physician.  Date of Face-to-Face Encounter: 3/27/23    I certify that, based on my findings, the following services are medically necessary home health services: PT OT home health aide    My clinical findings support the need for the above skilled services because: PT OT for continued strength and endurance, home health aide to assist with activities of daily living.    This patient is homebound because: She is deconditioned easily fatigued following recent left hemiarthroplasty and will continue home therapy    The patient is, or has been, under my care and I have initiated the establishment of the plan of care. This patient will be followed by a physician who will periodically review the plan of care.    Schedule follow up visit with primary care provider within 7 days to reestablish care.    Electronically signed by: Sarah House CNP          Sincerely,        Sarah House CNP

## 2023-11-15 ENCOUNTER — LAB REQUISITION (OUTPATIENT)
Dept: LAB | Facility: CLINIC | Age: 83
End: 2023-11-15

## 2023-11-15 DIAGNOSIS — M25.559 PAIN IN UNSPECIFIED HIP: ICD-10-CM

## 2023-11-15 LAB
BASOPHILS # BLD AUTO: 0 10E3/UL (ref 0–0.2)
BASOPHILS NFR BLD AUTO: 0 %
CRP SERPL-MCNC: <3 MG/L
EOSINOPHIL # BLD AUTO: 0.2 10E3/UL (ref 0–0.7)
EOSINOPHIL NFR BLD AUTO: 2 %
ERYTHROCYTE [DISTWIDTH] IN BLOOD BY AUTOMATED COUNT: 13.9 % (ref 10–15)
ERYTHROCYTE [SEDIMENTATION RATE] IN BLOOD BY WESTERGREN METHOD: 12 MM/HR (ref 0–30)
HCT VFR BLD AUTO: 45.2 % (ref 35–47)
HGB BLD-MCNC: 14.6 G/DL (ref 11.7–15.7)
IMM GRANULOCYTES # BLD: 0 10E3/UL
IMM GRANULOCYTES NFR BLD: 0 %
LYMPHOCYTES # BLD AUTO: 2.8 10E3/UL (ref 0.8–5.3)
LYMPHOCYTES NFR BLD AUTO: 31 %
MCH RBC QN AUTO: 31.2 PG (ref 26.5–33)
MCHC RBC AUTO-ENTMCNC: 32.3 G/DL (ref 31.5–36.5)
MCV RBC AUTO: 97 FL (ref 78–100)
MONOCYTES # BLD AUTO: 0.5 10E3/UL (ref 0–1.3)
MONOCYTES NFR BLD AUTO: 5 %
NEUTROPHILS # BLD AUTO: 5.6 10E3/UL (ref 1.6–8.3)
NEUTROPHILS NFR BLD AUTO: 62 %
NRBC # BLD AUTO: 0 10E3/UL
NRBC BLD AUTO-RTO: 0 /100
PLATELET # BLD AUTO: 262 10E3/UL (ref 150–450)
RBC # BLD AUTO: 4.68 10E6/UL (ref 3.8–5.2)
WBC # BLD AUTO: 9.1 10E3/UL (ref 4–11)

## 2023-11-15 PROCEDURE — 86140 C-REACTIVE PROTEIN: CPT | Performed by: FAMILY MEDICINE

## 2023-11-15 PROCEDURE — 85652 RBC SED RATE AUTOMATED: CPT | Performed by: FAMILY MEDICINE

## 2023-11-15 PROCEDURE — 85025 COMPLETE CBC W/AUTO DIFF WBC: CPT | Performed by: FAMILY MEDICINE

## 2024-01-09 ENCOUNTER — LAB REQUISITION (OUTPATIENT)
Dept: LAB | Facility: CLINIC | Age: 84
End: 2024-01-09

## 2024-01-09 DIAGNOSIS — I10 ESSENTIAL (PRIMARY) HYPERTENSION: ICD-10-CM

## 2024-01-09 DIAGNOSIS — E03.9 HYPOTHYROIDISM, UNSPECIFIED: ICD-10-CM

## 2024-01-09 DIAGNOSIS — E78.5 HYPERLIPIDEMIA, UNSPECIFIED: ICD-10-CM

## 2024-01-09 LAB
ERYTHROCYTE [DISTWIDTH] IN BLOOD BY AUTOMATED COUNT: 13.2 % (ref 10–15)
HCT VFR BLD AUTO: 42.9 % (ref 35–47)
HGB BLD-MCNC: 14.1 G/DL (ref 11.7–15.7)
MCH RBC QN AUTO: 31.5 PG (ref 26.5–33)
MCHC RBC AUTO-ENTMCNC: 32.9 G/DL (ref 31.5–36.5)
MCV RBC AUTO: 96 FL (ref 78–100)
PLATELET # BLD AUTO: 252 10E3/UL (ref 150–450)
RBC # BLD AUTO: 4.47 10E6/UL (ref 3.8–5.2)
WBC # BLD AUTO: 7.4 10E3/UL (ref 4–11)

## 2024-01-09 PROCEDURE — 80061 LIPID PANEL: CPT | Performed by: FAMILY MEDICINE

## 2024-01-09 PROCEDURE — 84443 ASSAY THYROID STIM HORMONE: CPT | Performed by: FAMILY MEDICINE

## 2024-01-09 PROCEDURE — 80053 COMPREHEN METABOLIC PANEL: CPT | Performed by: FAMILY MEDICINE

## 2024-01-09 PROCEDURE — 85041 AUTOMATED RBC COUNT: CPT | Performed by: FAMILY MEDICINE

## 2024-01-10 LAB
ALBUMIN SERPL BCG-MCNC: 4 G/DL (ref 3.5–5.2)
ALP SERPL-CCNC: 90 U/L (ref 40–150)
ALT SERPL W P-5'-P-CCNC: 23 U/L (ref 0–50)
ANION GAP SERPL CALCULATED.3IONS-SCNC: 11 MMOL/L (ref 7–15)
AST SERPL W P-5'-P-CCNC: 18 U/L (ref 0–45)
BILIRUB SERPL-MCNC: 0.3 MG/DL
BUN SERPL-MCNC: 14.6 MG/DL (ref 8–23)
CALCIUM SERPL-MCNC: 9.4 MG/DL (ref 8.8–10.2)
CHLORIDE SERPL-SCNC: 105 MMOL/L (ref 98–107)
CHOLEST SERPL-MCNC: 175 MG/DL
CREAT SERPL-MCNC: 0.69 MG/DL (ref 0.51–0.95)
DEPRECATED HCO3 PLAS-SCNC: 25 MMOL/L (ref 22–29)
EGFRCR SERPLBLD CKD-EPI 2021: 87 ML/MIN/1.73M2
FASTING STATUS PATIENT QL REPORTED: NORMAL
GLUCOSE SERPL-MCNC: 127 MG/DL (ref 70–99)
HDLC SERPL-MCNC: 54 MG/DL
LDLC SERPL CALC-MCNC: 95 MG/DL
NONHDLC SERPL-MCNC: 121 MG/DL
POTASSIUM SERPL-SCNC: 3.7 MMOL/L (ref 3.4–5.3)
PROT SERPL-MCNC: 6.7 G/DL (ref 6.4–8.3)
SODIUM SERPL-SCNC: 141 MMOL/L (ref 135–145)
TRIGL SERPL-MCNC: 129 MG/DL
TSH SERPL DL<=0.005 MIU/L-ACNC: 2.84 UIU/ML (ref 0.3–4.2)

## 2024-01-21 ENCOUNTER — HEALTH MAINTENANCE LETTER (OUTPATIENT)
Age: 84
End: 2024-01-21

## 2024-01-29 RX ORDER — ACETAMINOPHEN 500 MG
500-1000 TABLET ORAL EVERY 8 HOURS PRN
Status: ON HOLD | COMMUNITY
End: 2024-02-08

## 2024-01-29 NOTE — PROGRESS NOTES
Planning to discharge home on POD 1 or POD 2 after this revision surgery with her  helping her.       01/29/24 1114   Discharge Planning   Patient/Family Anticipates Transition to home with family   Concerns to be Addressed all concerns addressed in this encounter   Living Arrangements   People in Home spouse   Type of Residence Private Residence   Is your private residence a single family home or apartment? Single family home   Number of Stairs, Within Home, Primary greater than 10 stairs  (2 exterior steps)   Stair Railings, Within Home, Primary railings safe and in good condition   Once home, are you able to live on one level? No   Which rooms are not on the main level? Bedroom   Bathroom Shower/Tub Walk-in shower   Equipment Currently Used at Home shower chair;grab bar, tub/shower;raised toilet seat;cane, quad  (Has a walker at home)   Support System   Support Systems Spouse/Significant Other  (, Per)   Do you have someone available to stay with you one or two nights once you are home? Yes   Education   Patient attended total joint pre-op class/received pre-op teaching  email/phone call

## 2024-02-06 ENCOUNTER — ANESTHESIA EVENT (OUTPATIENT)
Dept: SURGERY | Facility: CLINIC | Age: 84
DRG: 468 | End: 2024-02-06
Payer: COMMERCIAL

## 2024-02-07 ENCOUNTER — APPOINTMENT (OUTPATIENT)
Dept: RADIOLOGY | Facility: CLINIC | Age: 84
DRG: 468 | End: 2024-02-07
Attending: ORTHOPAEDIC SURGERY
Payer: COMMERCIAL

## 2024-02-07 ENCOUNTER — APPOINTMENT (OUTPATIENT)
Dept: PHYSICAL THERAPY | Facility: CLINIC | Age: 84
DRG: 468 | End: 2024-02-07
Attending: ORTHOPAEDIC SURGERY
Payer: COMMERCIAL

## 2024-02-07 ENCOUNTER — HOSPITAL ENCOUNTER (INPATIENT)
Facility: CLINIC | Age: 84
LOS: 2 days | Discharge: HOME | DRG: 468 | End: 2024-02-09
Attending: ORTHOPAEDIC SURGERY | Admitting: ORTHOPAEDIC SURGERY
Payer: COMMERCIAL

## 2024-02-07 ENCOUNTER — ANESTHESIA (OUTPATIENT)
Dept: SURGERY | Facility: CLINIC | Age: 84
DRG: 468 | End: 2024-02-07
Payer: COMMERCIAL

## 2024-02-07 DIAGNOSIS — S72.002A CLOSED FRACTURE OF NECK OF LEFT FEMUR, INITIAL ENCOUNTER (H): Primary | ICD-10-CM

## 2024-02-07 DIAGNOSIS — T84.011S FAILURE OF LEFT TOTAL HIP ARTHROPLASTY, SEQUELA: ICD-10-CM

## 2024-02-07 PROBLEM — Z96.649 FAILED TOTAL HIP ARTHROPLASTY (H): Status: ACTIVE | Noted: 2024-02-07

## 2024-02-07 PROBLEM — T84.018A FAILED TOTAL HIP ARTHROPLASTY (H): Status: ACTIVE | Noted: 2024-02-07

## 2024-02-07 LAB — GLUCOSE BLDC GLUCOMTR-MCNC: 98 MG/DL (ref 70–99)

## 2024-02-07 PROCEDURE — 999N000141 HC STATISTIC PRE-PROCEDURE NURSING ASSESSMENT: Performed by: ORTHOPAEDIC SURGERY

## 2024-02-07 PROCEDURE — 250N000011 HC RX IP 250 OP 636: Performed by: PHYSICIAN ASSISTANT

## 2024-02-07 PROCEDURE — 0SRB01A REPLACEMENT OF LEFT HIP JOINT WITH METAL SYNTHETIC SUBSTITUTE, UNCEMENTED, OPEN APPROACH: ICD-10-PCS | Performed by: ORTHOPAEDIC SURGERY

## 2024-02-07 PROCEDURE — 999N000065 XR PELVIS AND HIP PORTABLE LEFT 1 VIEW

## 2024-02-07 PROCEDURE — 250N000009 HC RX 250: Performed by: PHYSICIAN ASSISTANT

## 2024-02-07 PROCEDURE — 250N000011 HC RX IP 250 OP 636: Performed by: ORTHOPAEDIC SURGERY

## 2024-02-07 PROCEDURE — 250N000013 HC RX MED GY IP 250 OP 250 PS 637: Performed by: PHYSICIAN ASSISTANT

## 2024-02-07 PROCEDURE — 250N000009 HC RX 250: Performed by: ORTHOPAEDIC SURGERY

## 2024-02-07 PROCEDURE — 250N000011 HC RX IP 250 OP 636: Performed by: ANESTHESIOLOGY

## 2024-02-07 PROCEDURE — 250N000011 HC RX IP 250 OP 636: Performed by: NURSE ANESTHETIST, CERTIFIED REGISTERED

## 2024-02-07 PROCEDURE — 0SPB0JZ REMOVAL OF SYNTHETIC SUBSTITUTE FROM LEFT HIP JOINT, OPEN APPROACH: ICD-10-PCS | Performed by: ORTHOPAEDIC SURGERY

## 2024-02-07 PROCEDURE — 97161 PT EVAL LOW COMPLEX 20 MIN: CPT | Mod: GP

## 2024-02-07 PROCEDURE — 710N000010 HC RECOVERY PHASE 1, LEVEL 2, PER MIN: Performed by: ORTHOPAEDIC SURGERY

## 2024-02-07 PROCEDURE — C1713 ANCHOR/SCREW BN/BN,TIS/BN: HCPCS | Performed by: ORTHOPAEDIC SURGERY

## 2024-02-07 PROCEDURE — 120N000001 HC R&B MED SURG/OB

## 2024-02-07 PROCEDURE — 97116 GAIT TRAINING THERAPY: CPT | Mod: GP

## 2024-02-07 PROCEDURE — 258N000003 HC RX IP 258 OP 636: Performed by: ANESTHESIOLOGY

## 2024-02-07 PROCEDURE — 272N000001 HC OR GENERAL SUPPLY STERILE: Performed by: ORTHOPAEDIC SURGERY

## 2024-02-07 PROCEDURE — 258N000003 HC RX IP 258 OP 636: Performed by: ORTHOPAEDIC SURGERY

## 2024-02-07 PROCEDURE — 99221 1ST HOSP IP/OBS SF/LOW 40: CPT | Performed by: FAMILY MEDICINE

## 2024-02-07 PROCEDURE — 97110 THERAPEUTIC EXERCISES: CPT | Mod: GP

## 2024-02-07 PROCEDURE — 370N000017 HC ANESTHESIA TECHNICAL FEE, PER MIN: Performed by: ORTHOPAEDIC SURGERY

## 2024-02-07 PROCEDURE — 250N000013 HC RX MED GY IP 250 OP 250 PS 637: Performed by: ORTHOPAEDIC SURGERY

## 2024-02-07 PROCEDURE — 250N000013 HC RX MED GY IP 250 OP 250 PS 637: Performed by: FAMILY MEDICINE

## 2024-02-07 PROCEDURE — 258N000003 HC RX IP 258 OP 636: Performed by: NURSE ANESTHETIST, CERTIFIED REGISTERED

## 2024-02-07 PROCEDURE — 360N000078 HC SURGERY LEVEL 5, PER MIN: Performed by: ORTHOPAEDIC SURGERY

## 2024-02-07 PROCEDURE — C1776 JOINT DEVICE (IMPLANTABLE): HCPCS | Performed by: ORTHOPAEDIC SURGERY

## 2024-02-07 DEVICE — PINNACLE HIP SOLUTIONS ALTRX POLYETHYLENE ACETABULAR LINER +4 10 DEGREES 32MM ID 48MM OD
Type: IMPLANTABLE DEVICE | Site: HIP | Status: FUNCTIONAL
Brand: PINNACLE ALTRX

## 2024-02-07 DEVICE — PINNACLE CANCELLOUS BONE SCREW 6.5MM X 30MM
Type: IMPLANTABLE DEVICE | Site: HIP | Status: FUNCTIONAL
Brand: PINNACLE

## 2024-02-07 DEVICE — PINNACLE CANCELLOUS BONE SCREW 6.5MM X 15MM
Type: IMPLANTABLE DEVICE | Site: HIP | Status: FUNCTIONAL
Brand: PINNACLE

## 2024-02-07 DEVICE — ARTICUL/EZE FEMORAL HEAD DIAMETER 32MM +13 12/14 TAPER
Type: IMPLANTABLE DEVICE | Site: HIP | Status: FUNCTIONAL
Brand: ARTICUL/EZE

## 2024-02-07 DEVICE — PINNACLE GRIPTION ACETABULAR SHELL MULTI-HOLE 48MM OD
Type: IMPLANTABLE DEVICE | Site: HIP | Status: FUNCTIONAL
Brand: PINNACLE GRIPTION

## 2024-02-07 RX ORDER — OXYCODONE HYDROCHLORIDE 5 MG/1
5 TABLET ORAL EVERY 4 HOURS PRN
Status: DISCONTINUED | OUTPATIENT
Start: 2024-02-07 | End: 2024-02-08

## 2024-02-07 RX ORDER — PANTOPRAZOLE SODIUM 40 MG/1
40 TABLET, DELAYED RELEASE ORAL
Status: DISCONTINUED | OUTPATIENT
Start: 2024-02-07 | End: 2024-02-09 | Stop reason: HOSPADM

## 2024-02-07 RX ORDER — NALOXONE HYDROCHLORIDE 0.4 MG/ML
0.4 INJECTION, SOLUTION INTRAMUSCULAR; INTRAVENOUS; SUBCUTANEOUS
Status: DISCONTINUED | OUTPATIENT
Start: 2024-02-07 | End: 2024-02-09 | Stop reason: HOSPADM

## 2024-02-07 RX ORDER — SODIUM CHLORIDE, SODIUM LACTATE, POTASSIUM CHLORIDE, CALCIUM CHLORIDE 600; 310; 30; 20 MG/100ML; MG/100ML; MG/100ML; MG/100ML
INJECTION, SOLUTION INTRAVENOUS CONTINUOUS
Status: DISCONTINUED | OUTPATIENT
Start: 2024-02-07 | End: 2024-02-07

## 2024-02-07 RX ORDER — CEFAZOLIN SODIUM 1 G/3ML
1 INJECTION, POWDER, FOR SOLUTION INTRAMUSCULAR; INTRAVENOUS EVERY 8 HOURS
Qty: 10 ML | Refills: 0 | Status: COMPLETED | OUTPATIENT
Start: 2024-02-07 | End: 2024-02-07

## 2024-02-07 RX ORDER — OMEPRAZOLE 40 MG/1
40 CAPSULE, DELAYED RELEASE ORAL
COMMUNITY
Start: 2023-10-02

## 2024-02-07 RX ORDER — ACETAMINOPHEN 325 MG/1
975 TABLET ORAL ONCE
Status: COMPLETED | OUTPATIENT
Start: 2024-02-07 | End: 2024-02-07

## 2024-02-07 RX ORDER — MAGNESIUM OXIDE/MAG AA CHELATE 300 MG
1 CAPSULE ORAL
COMMUNITY

## 2024-02-07 RX ORDER — PHENYLEPHRINE HCL IN 0.9% NACL 50MG/250ML
PLASTIC BAG, INJECTION (ML) INTRAVENOUS CONTINUOUS PRN
Status: DISCONTINUED | OUTPATIENT
Start: 2024-02-07 | End: 2024-02-07

## 2024-02-07 RX ORDER — TRANEXAMIC ACID 650 MG/1
1950 TABLET ORAL ONCE
Status: COMPLETED | OUTPATIENT
Start: 2024-02-07 | End: 2024-02-07

## 2024-02-07 RX ORDER — LIDOCAINE 40 MG/G
CREAM TOPICAL
Status: DISCONTINUED | OUTPATIENT
Start: 2024-02-07 | End: 2024-02-09 | Stop reason: HOSPADM

## 2024-02-07 RX ORDER — PROPOFOL 10 MG/ML
INJECTION, EMULSION INTRAVENOUS PRN
Status: DISCONTINUED | OUTPATIENT
Start: 2024-02-07 | End: 2024-02-07

## 2024-02-07 RX ORDER — SODIUM CHLORIDE, SODIUM LACTATE, POTASSIUM CHLORIDE, CALCIUM CHLORIDE 600; 310; 30; 20 MG/100ML; MG/100ML; MG/100ML; MG/100ML
INJECTION, SOLUTION INTRAVENOUS CONTINUOUS
Status: DISCONTINUED | OUTPATIENT
Start: 2024-02-07 | End: 2024-02-09 | Stop reason: HOSPADM

## 2024-02-07 RX ORDER — FENTANYL CITRATE 0.05 MG/ML
INJECTION, SOLUTION INTRAMUSCULAR; INTRAVENOUS PRN
Status: DISCONTINUED | OUTPATIENT
Start: 2024-02-07 | End: 2024-02-07

## 2024-02-07 RX ORDER — GLYCINE 1.5 G/100ML
SOLUTION IRRIGATION PRN
Status: DISCONTINUED | OUTPATIENT
Start: 2024-02-07 | End: 2024-02-07 | Stop reason: HOSPADM

## 2024-02-07 RX ORDER — HYDROMORPHONE HCL IN WATER/PF 6 MG/30 ML
0.4 PATIENT CONTROLLED ANALGESIA SYRINGE INTRAVENOUS EVERY 5 MIN PRN
Status: DISCONTINUED | OUTPATIENT
Start: 2024-02-07 | End: 2024-02-07

## 2024-02-07 RX ORDER — PROCHLORPERAZINE MALEATE 5 MG
5 TABLET ORAL EVERY 6 HOURS PRN
Status: DISCONTINUED | OUTPATIENT
Start: 2024-02-07 | End: 2024-02-09 | Stop reason: HOSPADM

## 2024-02-07 RX ORDER — SODIUM CHLORIDE, SODIUM LACTATE, POTASSIUM CHLORIDE, CALCIUM CHLORIDE 600; 310; 30; 20 MG/100ML; MG/100ML; MG/100ML; MG/100ML
INJECTION, SOLUTION INTRAVENOUS CONTINUOUS
Status: DISCONTINUED | OUTPATIENT
Start: 2024-02-07 | End: 2024-02-07 | Stop reason: HOSPADM

## 2024-02-07 RX ORDER — OXYCODONE HYDROCHLORIDE 5 MG/1
5 TABLET ORAL EVERY 4 HOURS PRN
Qty: 25 TABLET | Refills: 0 | Status: CANCELLED | OUTPATIENT
Start: 2024-02-07

## 2024-02-07 RX ORDER — ASPIRIN 81 MG/1
81 TABLET ORAL 2 TIMES DAILY
Qty: 60 TABLET | Refills: 0 | Status: CANCELLED | OUTPATIENT
Start: 2024-02-07 | End: 2024-03-08

## 2024-02-07 RX ORDER — CEFAZOLIN SODIUM/WATER 2 G/20 ML
2 SYRINGE (ML) INTRAVENOUS SEE ADMIN INSTRUCTIONS
Status: DISCONTINUED | OUTPATIENT
Start: 2024-02-07 | End: 2024-02-09 | Stop reason: HOSPADM

## 2024-02-07 RX ORDER — NALOXONE HYDROCHLORIDE 0.4 MG/ML
0.2 INJECTION, SOLUTION INTRAMUSCULAR; INTRAVENOUS; SUBCUTANEOUS
Status: DISCONTINUED | OUTPATIENT
Start: 2024-02-07 | End: 2024-02-09 | Stop reason: HOSPADM

## 2024-02-07 RX ORDER — CEFAZOLIN SODIUM/WATER 2 G/20 ML
2 SYRINGE (ML) INTRAVENOUS
Status: COMPLETED | OUTPATIENT
Start: 2024-02-07 | End: 2024-02-07

## 2024-02-07 RX ORDER — ONDANSETRON 2 MG/ML
4 INJECTION INTRAMUSCULAR; INTRAVENOUS EVERY 6 HOURS PRN
Status: DISCONTINUED | OUTPATIENT
Start: 2024-02-07 | End: 2024-02-09 | Stop reason: HOSPADM

## 2024-02-07 RX ORDER — AMOXICILLIN 250 MG
1 CAPSULE ORAL 2 TIMES DAILY
Status: DISCONTINUED | OUTPATIENT
Start: 2024-02-07 | End: 2024-02-09 | Stop reason: HOSPADM

## 2024-02-07 RX ORDER — ONDANSETRON 4 MG/1
4 TABLET, ORALLY DISINTEGRATING ORAL EVERY 30 MIN PRN
Status: DISCONTINUED | OUTPATIENT
Start: 2024-02-07 | End: 2024-02-07 | Stop reason: HOSPADM

## 2024-02-07 RX ORDER — ASPIRIN 81 MG/1
81 TABLET ORAL
Status: CANCELLED | COMMUNITY
Start: 2024-03-09

## 2024-02-07 RX ORDER — ONDANSETRON 2 MG/ML
4 INJECTION INTRAMUSCULAR; INTRAVENOUS EVERY 30 MIN PRN
Status: DISCONTINUED | OUTPATIENT
Start: 2024-02-07 | End: 2024-02-07 | Stop reason: HOSPADM

## 2024-02-07 RX ORDER — LEVOTHYROXINE SODIUM 50 UG/1
100 TABLET ORAL
Status: DISCONTINUED | OUTPATIENT
Start: 2024-02-09 | End: 2024-02-09 | Stop reason: HOSPADM

## 2024-02-07 RX ORDER — ONDANSETRON 4 MG/1
4 TABLET, ORALLY DISINTEGRATING ORAL EVERY 6 HOURS PRN
Status: DISCONTINUED | OUTPATIENT
Start: 2024-02-07 | End: 2024-02-09 | Stop reason: HOSPADM

## 2024-02-07 RX ORDER — AMOXICILLIN 250 MG
1 CAPSULE ORAL 2 TIMES DAILY
Qty: 30 TABLET | Refills: 0 | Status: CANCELLED | OUTPATIENT
Start: 2024-02-07

## 2024-02-07 RX ORDER — FENTANYL CITRATE 50 UG/ML
25 INJECTION, SOLUTION INTRAMUSCULAR; INTRAVENOUS EVERY 5 MIN PRN
Status: DISCONTINUED | OUTPATIENT
Start: 2024-02-07 | End: 2024-02-07

## 2024-02-07 RX ORDER — HYDROMORPHONE HCL IN WATER/PF 6 MG/30 ML
0.2 PATIENT CONTROLLED ANALGESIA SYRINGE INTRAVENOUS EVERY 5 MIN PRN
Status: DISCONTINUED | OUTPATIENT
Start: 2024-02-07 | End: 2024-02-07

## 2024-02-07 RX ORDER — HYDROMORPHONE HCL IN WATER/PF 6 MG/30 ML
0.2 PATIENT CONTROLLED ANALGESIA SYRINGE INTRAVENOUS
Status: DISCONTINUED | OUTPATIENT
Start: 2024-02-07 | End: 2024-02-08

## 2024-02-07 RX ORDER — PANTOPRAZOLE SODIUM 40 MG/1
40 TABLET, DELAYED RELEASE ORAL
Status: DISCONTINUED | OUTPATIENT
Start: 2024-02-09 | End: 2024-02-07

## 2024-02-07 RX ORDER — BISACODYL 10 MG
10 SUPPOSITORY, RECTAL RECTAL DAILY PRN
Status: DISCONTINUED | OUTPATIENT
Start: 2024-02-07 | End: 2024-02-09 | Stop reason: HOSPADM

## 2024-02-07 RX ORDER — ASPIRIN 81 MG/1
81 TABLET ORAL 2 TIMES DAILY
Status: DISCONTINUED | OUTPATIENT
Start: 2024-02-07 | End: 2024-02-09 | Stop reason: HOSPADM

## 2024-02-07 RX ORDER — PROPOFOL 10 MG/ML
INJECTION, EMULSION INTRAVENOUS CONTINUOUS PRN
Status: DISCONTINUED | OUTPATIENT
Start: 2024-02-07 | End: 2024-02-07

## 2024-02-07 RX ORDER — HYDROMORPHONE HCL IN WATER/PF 6 MG/30 ML
0.4 PATIENT CONTROLLED ANALGESIA SYRINGE INTRAVENOUS
Status: DISCONTINUED | OUTPATIENT
Start: 2024-02-07 | End: 2024-02-08

## 2024-02-07 RX ORDER — ONDANSETRON 2 MG/ML
INJECTION INTRAMUSCULAR; INTRAVENOUS PRN
Status: DISCONTINUED | OUTPATIENT
Start: 2024-02-07 | End: 2024-02-07

## 2024-02-07 RX ORDER — POLYETHYLENE GLYCOL 3350 17 G/17G
17 POWDER, FOR SOLUTION ORAL DAILY
Status: DISCONTINUED | OUTPATIENT
Start: 2024-02-08 | End: 2024-02-09 | Stop reason: HOSPADM

## 2024-02-07 RX ORDER — ACETAMINOPHEN 325 MG/1
975 TABLET ORAL EVERY 8 HOURS
Qty: 27 TABLET | Refills: 0 | Status: DISCONTINUED | OUTPATIENT
Start: 2024-02-07 | End: 2024-02-09 | Stop reason: HOSPADM

## 2024-02-07 RX ORDER — FENTANYL CITRATE 50 UG/ML
50 INJECTION, SOLUTION INTRAMUSCULAR; INTRAVENOUS EVERY 5 MIN PRN
Status: DISCONTINUED | OUTPATIENT
Start: 2024-02-07 | End: 2024-02-07

## 2024-02-07 RX ORDER — ACETAMINOPHEN 325 MG/1
650 TABLET ORAL EVERY 4 HOURS PRN
Status: DISCONTINUED | OUTPATIENT
Start: 2024-02-10 | End: 2024-02-09 | Stop reason: HOSPADM

## 2024-02-07 RX ORDER — BUPIVACAINE HYDROCHLORIDE 5 MG/ML
INJECTION, SOLUTION EPIDURAL; INTRACAUDAL
Status: COMPLETED | OUTPATIENT
Start: 2024-02-07 | End: 2024-02-07

## 2024-02-07 RX ORDER — OXYCODONE HYDROCHLORIDE 5 MG/1
10 TABLET ORAL EVERY 4 HOURS PRN
Status: DISCONTINUED | OUTPATIENT
Start: 2024-02-07 | End: 2024-02-08

## 2024-02-07 RX ADMIN — ATORVASTATIN CALCIUM 5 MG: 10 TABLET, FILM COATED ORAL at 20:15

## 2024-02-07 RX ADMIN — SODIUM CHLORIDE, POTASSIUM CHLORIDE, SODIUM LACTATE AND CALCIUM CHLORIDE: 600; 310; 30; 20 INJECTION, SOLUTION INTRAVENOUS at 20:24

## 2024-02-07 RX ADMIN — ACETAMINOPHEN 975 MG: 325 TABLET ORAL at 22:30

## 2024-02-07 RX ADMIN — ACETAMINOPHEN 975 MG: 325 TABLET ORAL at 06:01

## 2024-02-07 RX ADMIN — ASPIRIN 81 MG: 81 TABLET, COATED ORAL at 20:15

## 2024-02-07 RX ADMIN — SODIUM CHLORIDE, POTASSIUM CHLORIDE, SODIUM LACTATE AND CALCIUM CHLORIDE: 600; 310; 30; 20 INJECTION, SOLUTION INTRAVENOUS at 06:43

## 2024-02-07 RX ADMIN — ONDANSETRON 4 MG: 2 INJECTION INTRAMUSCULAR; INTRAVENOUS at 08:36

## 2024-02-07 RX ADMIN — SODIUM CHLORIDE, POTASSIUM CHLORIDE, SODIUM LACTATE AND CALCIUM CHLORIDE: 600; 310; 30; 20 INJECTION, SOLUTION INTRAVENOUS at 10:49

## 2024-02-07 RX ADMIN — CEFAZOLIN 1 G: 1 INJECTION, POWDER, FOR SOLUTION INTRAMUSCULAR; INTRAVENOUS at 22:31

## 2024-02-07 RX ADMIN — ACETAMINOPHEN 975 MG: 325 TABLET ORAL at 14:26

## 2024-02-07 RX ADMIN — FENTANYL CITRATE 50 MCG: 0.05 INJECTION, SOLUTION INTRAMUSCULAR; INTRAVENOUS at 07:23

## 2024-02-07 RX ADMIN — SENNOSIDES AND DOCUSATE SODIUM 1 TABLET: 8.6; 5 TABLET ORAL at 12:11

## 2024-02-07 RX ADMIN — PROPOFOL 100 MCG/KG/MIN: 10 INJECTION, EMULSION INTRAVENOUS at 07:28

## 2024-02-07 RX ADMIN — ASPIRIN 81 MG: 81 TABLET, COATED ORAL at 12:11

## 2024-02-07 RX ADMIN — OXYCODONE HYDROCHLORIDE 5 MG: 5 TABLET ORAL at 10:06

## 2024-02-07 RX ADMIN — PANTOPRAZOLE SODIUM 40 MG: 40 TABLET, DELAYED RELEASE ORAL at 14:27

## 2024-02-07 RX ADMIN — CEFAZOLIN 1 G: 1 INJECTION, POWDER, FOR SOLUTION INTRAMUSCULAR; INTRAVENOUS at 14:28

## 2024-02-07 RX ADMIN — OXYCODONE HYDROCHLORIDE 10 MG: 5 TABLET ORAL at 20:15

## 2024-02-07 RX ADMIN — FENTANYL CITRATE 50 MCG: 0.05 INJECTION, SOLUTION INTRAMUSCULAR; INTRAVENOUS at 07:21

## 2024-02-07 RX ADMIN — PHENYLEPHRINE HYDROCHLORIDE 0.6 MCG/KG/MIN: 10 INJECTION INTRAVENOUS at 07:28

## 2024-02-07 RX ADMIN — BUPIVACAINE HYDROCHLORIDE 3 ML: 5 INJECTION, SOLUTION EPIDURAL; INTRACAUDAL; PERINEURAL at 07:25

## 2024-02-07 RX ADMIN — OXYCODONE HYDROCHLORIDE 5 MG: 5 TABLET ORAL at 14:26

## 2024-02-07 RX ADMIN — TRANEXAMIC ACID 1950 MG: 650 TABLET ORAL at 06:01

## 2024-02-07 RX ADMIN — Medication 2 G: at 07:21

## 2024-02-07 RX ADMIN — PROPOFOL 30 MG: 10 INJECTION, EMULSION INTRAVENOUS at 07:28

## 2024-02-07 RX ADMIN — PHENYLEPHRINE HYDROCHLORIDE 100 MCG: 10 INJECTION INTRAVENOUS at 07:28

## 2024-02-07 RX ADMIN — SENNOSIDES AND DOCUSATE SODIUM 1 TABLET: 8.6; 5 TABLET ORAL at 20:15

## 2024-02-07 RX ADMIN — HYDROMORPHONE HYDROCHLORIDE 0.2 MG: 0.2 INJECTION, SOLUTION INTRAMUSCULAR; INTRAVENOUS; SUBCUTANEOUS at 16:11

## 2024-02-07 ASSESSMENT — ACTIVITIES OF DAILY LIVING (ADL)
ADLS_ACUITY_SCORE: 26
ADLS_ACUITY_SCORE: 28
FALL_HISTORY_WITHIN_LAST_SIX_MONTHS: NO
TOILETING_ISSUES: NO
DOING_ERRANDS_INDEPENDENTLY_DIFFICULTY: NO
DRESSING/BATHING_DIFFICULTY: NO
CONCENTRATING,_REMEMBERING_OR_MAKING_DECISIONS_DIFFICULTY: NO
DIFFICULTY_EATING/SWALLOWING: NO
ADLS_ACUITY_SCORE: 26
WALKING_OR_CLIMBING_STAIRS_DIFFICULTY: YES
DIFFICULTY_COMMUNICATING: NO
ADLS_ACUITY_SCORE: 27
CHANGE_IN_FUNCTIONAL_STATUS_SINCE_ONSET_OF_CURRENT_ILLNESS/INJURY: NO
ADLS_ACUITY_SCORE: 26
EQUIPMENT_CURRENTLY_USED_AT_HOME: CANE, STRAIGHT;GRAB BAR, TUB/SHOWER;SHOWER CHAIR
ADLS_ACUITY_SCORE: 28
ADLS_ACUITY_SCORE: 28
ADLS_ACUITY_SCORE: 27
WEAR_GLASSES_OR_BLIND: NO
ADLS_ACUITY_SCORE: 28

## 2024-02-07 NOTE — ANESTHESIA POSTPROCEDURE EVALUATION
Patient: Cristiane Birmingham    Procedure: Procedure(s):  LEFT HIP CONVERSION BIPOLAR TO TOTAL HIP ARTHROPLASTY POSTERIOR APPROACH       Anesthesia Type:  Spinal    Note:     Postop Pain Control: Uneventful            Sign Out: Well controlled pain   PONV: No   Neuro/Psych: Uneventful            Sign Out: Acceptable/Baseline neuro status   Airway/Respiratory: Uneventful            Sign Out: Acceptable/Baseline resp. status   CV/Hemodynamics: Uneventful            Sign Out: Acceptable CV status; No obvious hypovolemia; No obvious fluid overload   Other NRE: NONE   DID A NON-ROUTINE EVENT OCCUR? No           Last vitals:  Vitals Value Taken Time   /58 02/07/24 1100   Temp 36.2  C (97.1  F) 02/07/24 0939   Pulse 90 02/07/24 1100   Resp 16 02/07/24 1030   SpO2 96 % 02/07/24 1100       Electronically Signed By: Chacho Díaz MD  February 7, 2024  1:14 PM

## 2024-02-07 NOTE — CONSULTS
[facility] MEDICINE CONSULT NOTE   Physician requesting consult: Eduin Yousif,*    Reason for consult: Postoperative medical management of medical co-morbidities as below    Assessment and Plan    Cristiane Birmingham is a 83 year old old female with a history of GERD and hypothyroidism who presented for an elective left hip conversion bipolar 2 total hip arthroplasty. [facility] service was asked to evaluate patient for postoperative medical management as follows below. Please resume the home medications as reconciled and further noted below with ordered hold parameters.  Vital signs have been stable post-operatively including hemodynamically stable blood pressure and heart rate. Thank you for this consult; we will continue to follow this patient until discharge.    Left hip conversion bipolar total  Routine postoperative cares  PT and OT evaluation  Pain control  VTE prophylaxis per primary team    Hypothyroidism    GERD  Will schedule PPI daily while here in the hospital  Back to prehospital regimen at discharge    Hyperlipidemia      Procedure(s):  LEFT HIP CONVERSION BIPOLAR TO TOTAL HIP ARTHROPLASTY POSTERIOR APPROACH  Post-operative Day: Day of Surgery  Code status:Full Code         Estimated Blood Loss:  200 mL    Hospital Problem List   No problem-specific Assessment & Plan notes found for this encounter.    Principal Problem:    Failed total hip arthroplasty  (H24)      -Reviewed the patient's preoperative H and P and updated missing elements.  -Home medication reconciliation has been reviewed.  Medications have been ordered as noted from the home list and changes are documented above     HISTORY     Cristiane Birmingham is a 83 year old old female with a history of GERD and hypothyroidism who presents to the hospital for an elective orthopedic procedure.  Please see the operative note for details of the surgery.  Please see H&P which was reviewed by me for preoperative course.  Role  [facility] medicine discussed and questions answered.  Currently the patient is doing well.  Minimal pain.  She is not yet urinated since surgery.  Has a history of hypothyroidism.  Is on medication for this.  Has history of reflux and takes a alternating daily dose.  No history of DVT or PE.  No history of sleep apnea that she is aware of.    Past Medical History     Patient Active Problem List    Diagnosis Date Noted    Failed total hip arthroplasty  (H24) 2024     Priority: Medium    Fall, initial encounter 2023     Priority: Medium    Closed fracture of neck of left femur, initial encounter (H) 2023     Priority: Medium    Closed intertrochanteric fracture of hip, left, initial encounter (H) 2023     Priority: Medium    Benign essential hypertension 2020     Priority: Medium    Degenerative arthritis 2020     Priority: Medium    Diverticulitis of colon 2020     Priority: Medium    Gastroesophageal reflux disease 2020     Priority: Medium    Hyperlipidemia 2020     Priority: Medium    Hypothyroidism 2020     Priority: Medium    Senile incipient cataract 2020     Priority: Medium    Varicose veins of both lower extremities 2020     Priority: Medium    PVC's (premature ventricular contractions) 2020     Priority: Medium    Dyspnea on exertion 2020     Priority: Medium    Palpitations 2020     Priority: Medium        Surgical History   She  has a past surgical history that includes Breast Cyst Aspiration (Right, 2014); Biopsy breast (Left, 2009); Biopsy breast (Right, 2014); tubal ligation (); Strip vein (Bilateral); Open reduction internal fixation hip bipolar (Left, 2023); Dilation and curettage; Rhinoplasty ();  section (); Ankle Fracture Surgery (Right, 2022); Cataract Extraction; Soft tissue surgery (2019); and Tonsillectomy ().     Past Surgical History:   Procedure  Laterality Date    ANKLE FRACTURE SURGERY Right 2022    BIOPSY BREAST Left 2009    BIOPSY BREAST Right 2014    benign    BREAST CYST ASPIRATION Right 2014    CATARACT EXTRACTION       SECTION      DILATION AND CURETTAGE       and     OPEN REDUCTION INTERNAL FIXATION HIP BIPOLAR Left 2023    Procedure: HEMIARTHROPLASTY, HIP, BIPOLAR;  Surgeon: Khadar Kennedy MD;  Location: [facility] OR    RHINOPLASTY  1968    SOFT TISSUE SURGERY  2019    Skin Cancer removal on forehead    STRIP VEIN Bilateral     Legs    TONSILLECTOMY  1950    TUBAL LIGATION         Family History    Reviewed, and family history includes Brain Cancer in her maternal uncle; Breast Cancer in her cousin, cousin, and cousin; Breast Cancer (age of onset: 40.00) in her cousin; Cancer in her cousin, maternal uncle, and maternal uncle; Colon Cancer in her father, maternal grandfather, paternal uncle, and another family member; Colon Cancer (age of onset: 70.00) in her sister; Lung Cancer in her cousin, cousin, cousin, and cousin; Pancreatic Cancer in an other family member; Pancreatic Cancer (age of onset: 92.00) in her mother; Prostate Cancer in her paternal grandfather; Skin Cancer in her brother and brother; Stomach Cancer (age of onset: 50.00) in her cousin.    Social History    Reviewed, and she  reports that she has never smoked. She has never used smokeless tobacco. She reports that she does not currently use alcohol. She reports that she does not use drugs.  Social History     Tobacco Use    Smoking status: Never    Smokeless tobacco: Never   Substance Use Topics    Alcohol use: Not Currently       Allergies     Allergies   Allergen Reactions    Fosamax [Alendronate] Other (See Comments)     shakiness    Metronidazole Dizziness     Weakness    Sulfa Antibiotics GI Disturbance       Prior to Admission Medications      Medications Prior to Admission   Medication Sig Dispense Refill  Last Dose    acetaminophen (TYLENOL) 500 MG tablet Take 500-1,000 mg by mouth every 8 hours as needed for mild pain   Unknown at prn    ascorbic acid, vitamin C, (VITAMIN C) 1000 MG tablet Take 1,000 mg by mouth four times a week Monday, Wednesday, Friday, Sunday   Past Week at AM    aspirin 81 MG EC tablet Take 81 mg by mouth four times a week Monday, Wednesday, Friday, Sunday 1/31/2024 at AM    atorvastatin (LIPITOR) 10 MG tablet [ATORVASTATIN (LIPITOR) 10 MG TABLET] Take 5 mg by mouth at bedtime.   2/6/2024 at PM    calcium carbonate (CALCIUM 600 ORAL) Take 600 mg by mouth four times a week Monday, Wednesday, Friday, Sunday   Past Week at AM    levothyroxine (SYNTHROID, LEVOTHROID) 100 MCG tablet Take 100 mcg by mouth four times a week Monday, Wednesday, Friday, Sunday 2/7/2024 at AM    Magnesium 300 MG CAPS Take 1 capsule by mouth four times a week Monday, Wednesday, Friday, Sunday   Past Week at AM    Multiple Vitamins-Minerals (HAIR SKIN AND NAILS FORMULA) TABS Take 1 tablet by mouth four times a week Monday, Wednesday, Friday, Sunday   Past Week at AM    Multiple Vitamins-Minerals (OCUVITE PRESERVISION PO) Take 1 tablet by mouth four times a week Monday, Wednesday, Friday, Sunday   Past Week at AM    multivitamin-minerals-lutein (CENTRUM SILVER) tablet Take 1 tablet by mouth four times a week Monday, Wednesday, Friday, Sunday   Past Week at AM    omega 3-dha-epa-fish oil (FISH OIL) 1,000 mg (120 mg-180 mg) cap Take 1 capsule by mouth four times a week Monday, Wednesday, Friday, Sunday   Past Week at AM    omeprazole (PRILOSEC) 40 MG DR capsule Take 40 mg by mouth four times a week Monday, Wednesday, Friday, Sunday 2/7/2024 at AM       Review of Systems   A 12 point comprehensive review of systems was negative except as noted above.    OBJECTIVE         Physical Exam   Temp:  [96.9  F (36.1  C)-98.7  F (37.1  C)] 98  F (36.7  C)  Pulse:  [72-95] 86  Resp:  [16-20] 16  BP: (117-176)/(56-94) 121/58  SpO2:   [95 %-100 %] 95 %  Body mass index is 24.63 kg/m .  GENERAL:  Alert, appears comfortable, in no acute distress, appears stated age   HEAD:  Normocephalic, without obvious abnormality, atraumatic   NECK: Supple, symmetrical, trachea midline   BACK:   Symmetric, no curvature, ROM normal   LUNGS:   Clear to auscultation bilaterally, no rales, rhonchi, or wheezing, symmetric chest rise on inhalation, respirations unlabored   HEART:  Regular rate and rhythm, S1 and S2 normal, no murmur, rub, or gallop    ABDOMEN:   Soft, non-tender, bowel sounds active all four quadrants, no masses, no organomegaly, no rebound or guarding   EXTREMITIES: Extremities normal, atraumatic, no cyanosis or edema    SKIN: Dry to touch, no exanthems in the visualized areas   NEURO: Alert, oriented x 4, moves all four extremities freely/spontaneously   PSYCH: Cooperative, behavior is appropriate            Imaging Reviewed Personally By Myself    Radiology Results:   Recent Results (from the past 24 hour(s))   XR Pelvis w Hip Port Left  1 View    Narrative    EXAM: XR PELVIS AND HIP PORTABLE LEFT 1 VIEW  LOCATION: [facility]  DATE: 2/7/2024    INDICATION: Status post hip surgery.  COMPARISON: 03/12/2023.      Impression    IMPRESSION: Postop changes left total hip revision. No dislocation. Bones are demineralized. The medial acetabular wall is not well seen which may be related to technique, chronic bony thinning/dehiscence or an age-indeterminate fracture. Correlation   with the patient's surgical history is recommended and continued follow-up with attention to this area is advised.       Labs Reviewed Personally By Myself     Results for orders placed or performed during the hospital encounter of 02/07/24 (from the past 24 hour(s))   Glucose by meter   Result Value Ref Range    GLUCOSE BY METER POCT 98 70 - 99 mg/dL   XR Pelvis w Hip Port Left  1 View    Narrative    EXAM: XR PELVIS AND HIP PORTABLE LEFT 1  VIEW  LOCATION: [facility]  DATE: 2/7/2024    INDICATION: Status post hip surgery.  COMPARISON: 03/12/2023.      Impression    IMPRESSION: Postop changes left total hip revision. No dislocation. Bones are demineralized. The medial acetabular wall is not well seen which may be related to technique, chronic bony thinning/dehiscence or an age-indeterminate fracture. Correlation   with the patient's surgical history is recommended and continued follow-up with attention to this area is advised.     Most Recent 3 CBC's:  Recent Labs   Lab Test 01/09/24  1018 11/15/23  1003 03/14/23  0509 03/12/23  1034 03/11/23  1441   WBC 7.4 9.1  --   --  13.4*   HGB 14.1 14.6 11.5*   < > 14.9   MCV 96 97  --   --  93    262  --   --  235    < > = values in this interval not displayed.     Most Recent 3 BMP's:  Recent Labs   Lab Test 02/07/24  0634 01/09/24  1018 03/14/23  0604 03/13/23  0747 03/12/23  0806 03/11/23  1441 10/12/22  1528   NA  --  141  --   --   --  139 140   POTASSIUM  --  3.7  --   --   --  3.7 4.3   CHLORIDE  --  105  --   --   --  104 103   CO2  --  25  --   --   --  23 24   BUN  --  14.6  --   --   --  13.1 24.2*   CR  --  0.69  --   --  0.67 0.57 0.73   ANIONGAP  --  11  --   --   --  12 13   ANTHONY  --  9.4  --   --   --  9.2 9.1   GLC 98 127* 119*   < >  --  142* 125*    < > = values in this interval not displayed.     Most Recent 2 LFT's:  Recent Labs   Lab Test 01/09/24  1018 10/12/22  1528   AST 18 14   ALT 23 19   ALKPHOS 90 92   BILITOTAL 0.3 0.3     Most Recent 3 INR's:  Recent Labs   Lab Test 03/11/23  1441   INR 0.94       Preoperative Labs Reviewed Personally By Myself     Thank you for this consultation.  Appreciate the opportunity to participate in the care of Cristiane Birmingham, please feel free to contact us for any questions or concerns.    Bernardo Abernathy MD  [facility] Medicine  [facility]  Phone: #[phone]    Tt 47 min

## 2024-02-07 NOTE — ANESTHESIA PROCEDURE NOTES
"Intrathecal Procedure Note    Pre-Procedure   Staff -        Anesthesiologist:  Chacho Díaz MD       Performed By: anesthesiologist       Location: OR       Procedure Start/Stop Times: 2/7/2024 7:25 AM and 2/7/2024 7:28 AM       Pre-Anesthestic Checklist: patient identified, IV checked, risks and benefits discussed, informed consent, monitors and equipment checked, pre-op evaluation and at physician/surgeon's request  Timeout:       Correct Patient: Yes        Correct Procedure: Yes        Correct Site: Yes        Correct Position: Yes   Procedure Documentation  Procedure: intrathecal       Patient Position: sitting       Patient Prep/Sterile Barriers: sterile gloves, mask, patient draped       Skin prep: Chloraprep       Insertion Site: L3-4. (midline approach).       Needle Gauge: 25.        Needle Length (Inches): 3.5        Spinal Needle Type: Maggy tip       Introducer used       Introducer: 20 G       # of attempts: 1 and  # of redirects:     Assessment/Narrative         Paresthesias: No.       CSF fluid: clear.    Medication(s) Administered   0.5% Bupivacaine PF (Intrathecal) - Intrathecal   3 mL - 2/7/2024 7:25:00 AM  Medication Administration Time: 2/7/2024 7:25 AM      FOR [facility] ([facility] ONLY:   Pain Team Contact information: please page the Pain Team Via [employer]. Search \"Pain\". During daytime hours, please page the attending first. At night please page the resident first.      "

## 2024-02-07 NOTE — PHARMACY-ADMISSION MEDICATION HISTORY
Pharmacist TAMIKA Medication History    Admission medication history is complete. The information provided in this note is only as accurate as the sources available at the time of the update.    Medication reconciliation/reorder completed by provider prior to medication history? No    Information Source(s): Patient and Clinic records and Care Everywhere via in-person    Pertinent Information: N/A    Allergies reviewed with patient and updates made in EHR: yes    Medications available for use during hospital stay: None.      Medication History Completed By: José Miguel Victor [facility] 2/7/2024 6:46 AM    PTA Med List   Medication Sig Last Dose    acetaminophen (TYLENOL) 500 MG tablet Take 500-1,000 mg by mouth every 8 hours as needed for mild pain Unknown at prn    ascorbic acid, vitamin C, (VITAMIN C) 1000 MG tablet Take 1,000 mg by mouth four times a week Monday, Wednesday, Friday, Sunday Past Week at AM    aspirin 81 MG EC tablet Take 81 mg by mouth four times a week Monday, Wednesday, Friday, Sunday 1/31/2024 at AM    atorvastatin (LIPITOR) 10 MG tablet [ATORVASTATIN (LIPITOR) 10 MG TABLET] Take 5 mg by mouth at bedtime. 2/6/2024 at PM    calcium carbonate (CALCIUM 600 ORAL) Take 600 mg by mouth four times a week Monday, Wednesday, Friday, Sunday Past Week at AM    levothyroxine (SYNTHROID, LEVOTHROID) 100 MCG tablet Take 100 mcg by mouth four times a week Monday, Wednesday, Friday, Sunday 2/7/2024 at AM    Magnesium 300 MG CAPS Take 1 capsule by mouth four times a week Monday, Wednesday, Friday, Sunday Past Week at AM    Multiple Vitamins-Minerals (HAIR SKIN AND NAILS FORMULA) TABS Take 1 tablet by mouth four times a week Monday, Wednesday, Friday, Sunday Past Week at AM    Multiple Vitamins-Minerals (OCUVITE PRESERVISION PO) Take 1 tablet by mouth four times a week Monday, Wednesday, Friday, Sunday Past Week at AM    multivitamin-minerals-lutein (CENTRUM SILVER) tablet Take 1 tablet by mouth four times a week Monday,  Wednesday, Friday, Sunday Past Week at AM    omega 3-dha-epa-fish oil (FISH OIL) 1,000 mg (120 mg-180 mg) cap Take 1 capsule by mouth four times a week Monday, Wednesday, Friday, Sunday Past Week at AM    omeprazole (PRILOSEC) 40 MG DR capsule Take 40 mg by mouth four times a week Monday, Wednesday, Friday, Sunday 2/7/2024 at AM

## 2024-02-07 NOTE — ANESTHESIA PREPROCEDURE EVALUATION
Anesthesia Pre-Procedure Evaluation    Patient: Cristiane Birmingham   MRN: [id number] : 1940                  Past Medical History:   Diagnosis Date    Benign essential hypertension 2020    Breast cyst 2014    Colon polyp     Degenerative arthritis 2020    Diverticulitis of colon 2020    Dyspnea on exertion 2020    Gastroesophageal reflux disease 2020    History of hip fracture 2023    Left Hip    History of skin cancer     on forehead    Hyperlipidemia 2020    Hypothyroidism 2020    Inverted nipple     hx of inverted nipples    Palpitations 2020    PVC's (premature ventricular contractions) 2020    Senile incipient cataract 2020    Snores     Varicose veins of both lower extremities 2020      Past Surgical History:   Procedure Laterality Date    ANKLE FRACTURE SURGERY Right 2022    BIOPSY BREAST Left 2009    BIOPSY BREAST Right 2014    benign    BREAST CYST ASPIRATION Right 2014    CATARACT EXTRACTION       SECTION      DILATION AND CURETTAGE       and     OPEN REDUCTION INTERNAL FIXATION HIP BIPOLAR Left 2023    Procedure: HEMIARTHROPLASTY, HIP, BIPOLAR;  Surgeon: Khadar Kennedy MD;  Location: [facility] OR    RHINOPLASTY  1968    SOFT TISSUE SURGERY  2019    Skin Cancer removal on forehead    STRIP VEIN Bilateral     Legs    TONSILLECTOMY  1950    TUBAL LIGATION        Allergies   Allergen Reactions    Fosamax [Alendronate] Other (See Comments)     shakiness    Metronidazole Dizziness     Weakness    Sulfa Antibiotics GI Disturbance      Social History     Tobacco Use    Smoking status: Never    Smokeless tobacco: Never   Substance Use Topics    Alcohol use: Not Currently      Wt Readings from Last 1 Encounters:   24 67.1 kg (148 lb)        Anesthesia Evaluation   Pt has had prior anesthetic.     No history of anesthetic complications       ROS/MED  "HX  ENT/Pulmonary:  - neg pulmonary ROS     Neurologic: Comment: Hard of hearing      Cardiovascular:     (+) Dyslipidemia hypertension- -   -  - -                          Irregular Heartbeat/Palpitations (intermittent PVCs),         (-) ANTONIO   METS/Exercise Tolerance:     Hematologic:       Musculoskeletal:       GI/Hepatic:     (+) GERD,                   Renal/Genitourinary:  - neg Renal ROS     Endo:     (+)          thyroid problem,            Psychiatric/Substance Use:       Infectious Disease:       Malignancy:       Other:            Physical Exam    Airway        Mallampati: II   TM distance: > 3 FB   Neck ROM: full     Respiratory Devices and Support         Dental       (+) Minor Abnormalities - some fillings, tiny chips      Cardiovascular          Rhythm and rate: regular and normal     Pulmonary           breath sounds clear to auscultation           OUTSIDE LABS:  CBC:   Lab Results   Component Value Date    WBC 7.4 01/09/2024    WBC 9.1 11/15/2023    HGB 14.1 01/09/2024    HGB 14.6 11/15/2023    HCT 42.9 01/09/2024    HCT 45.2 11/15/2023     01/09/2024     11/15/2023     BMP:   Lab Results   Component Value Date     01/09/2024     03/11/2023    POTASSIUM 3.7 01/09/2024    POTASSIUM 3.7 03/11/2023    CHLORIDE 105 01/09/2024    CHLORIDE 104 03/11/2023    CO2 25 01/09/2024    CO2 23 03/11/2023    BUN 14.6 01/09/2024    BUN 13.1 03/11/2023    CR 0.69 01/09/2024    CR 0.67 03/12/2023    GLC 98 02/07/2024     (H) 01/09/2024     COAGS:   Lab Results   Component Value Date    INR 0.94 03/11/2023     POC: No results found for: \"BGM\", \"HCG\", \"HCGS\"  HEPATIC:   Lab Results   Component Value Date    ALBUMIN 4.0 01/09/2024    PROTTOTAL 6.7 01/09/2024    ALT 23 01/09/2024    AST 18 01/09/2024    ALKPHOS 90 01/09/2024    BILITOTAL 0.3 01/09/2024     OTHER:   Lab Results   Component Value Date    ANTHONY 9.4 01/09/2024    TSH 2.84 01/09/2024    SED 12 11/15/2023       Anesthesia " Plan    ASA Status:  3       Anesthesia Type: Spinal.              Consents    Anesthesia Plan(s) and associated risks, benefits, and realistic alternatives discussed. Questions answered and patient/representative(s) expressed understanding.     - Discussed: Risks, Benefits and Alternatives for BOTH SEDATION and the PROCEDURE were discussed     - Discussed with:  Patient, Spouse            Postoperative Care    Pain management: IV analgesics, Oral pain medications.   PONV prophylaxis: Ondansetron (or other 5HT-3), Dexamethasone or Solumedrol     Comments:                Chacho Díaz MD

## 2024-02-07 NOTE — PROGRESS NOTES
02/07/24 1526   Appointment Info   Signing Clinician's Name / Credentials (PT) Crystal Duran PT   Living Environment   People in Home spouse   Current Living Arrangements house   Home Accessibility stairs to enter home;stairs within home   Number of Stairs, Main Entrance 2   Stair Railings, Main Entrance other (see comments)  (holds on to husbands workbench)   Number of Stairs, Within Home, Primary greater than 10 stairs  (14 steps to bedroom)   Stair Railings, Within Home, Primary railings on both sides of stairs   Self-Care   Usual Activity Tolerance good   Current Activity Tolerance fair   Equipment Currently Used at Home none  (has FWW and cane)   Activity/Exercise/Self-Care Comment Reports independent with all mobility without AD at baseline.   General Information   Onset of Illness/Injury or Date of Surgery 02/07/24   Referring Physician Dr Eduin Yousif   Patient/Family Therapy Goals Statement (PT) No more pain   Pertinent History of Current Problem (include personal factors and/or comorbidities that impact the POC) Admit for L QIAN revision.   Existing Precautions/Restrictions no hip ER;no hip hyperextension;weight bearing   Weight-Bearing Status - LLE weight-bearing as tolerated   Pain Assessment   Patient Currently in Pain Yes, see Vital Sign flowsheet   Bed Mobility   Impairments Contributing to Impaired Bed Mobility pain;decreased ROM;decreased strength   Comment, (Bed Mobility) Supine>sit mod A.   Transfers   Impairments Contributing to Impaired Transfers pain;decreased ROM;decreased strength   Comment, (Transfers) Sit<>stand min A.   Gait/Stairs (Locomotion)   [place] Level (Gait) minimum assist (75% patient effort)   Assistive Device (Gait) walker, front-wheeled   Distance in Feet (Gait) 5'   Pattern (Gait) step-to   Deviations/Abnormal Patterns (Gait) antalgic;nolvia decreased;gait speed decreased;weight shifting decreased   Clinical Impression   Criteria for Skilled Therapeutic  Intervention Yes, treatment indicated   PT Diagnosis (PT) Impaired functional mobility   Influenced by the following impairments weakness, pain   Functional limitations due to impairments transfers, gait, stairs   Clinical Presentation (PT Evaluation Complexity) stable   Clinical Presentation Rationale Presents as medically diagnosed   Clinical Decision Making (Complexity) low complexity   Planned Therapy Interventions (PT) gait training;home exercise program;patient/family education;stair training;transfer training;progressive activity/exercise;home program guidelines   Risk & Benefits of therapy have been explained evaluation/treatment results reviewed;participants included;patient   PT Total Evaluation Time   PT Eval, Low Complexity Minutes (20558) 15   Physical Therapy Goals   PT Frequency Daily   PT Predicted Duration/Target Date for Goal Attainment 02/08/24   PT Goals Transfers;Gait;Stairs;PT Goal 1   PT: Transfers Supervision/stand-by assist;Sit to/from stand;Assistive device;Within precautions   PT: Gait Supervision/stand-by assist;Assistive device;Rolling walker;Within precautions;100 feet   PT: Stairs Supervision/stand-by assist;Within precautions;8 stairs;Rail on both sides   PT: Goal 1 Independent with HEP following written handout.   Interventions   Interventions Quick Adds Gait Training;Therapeutic Activity;Therapeutic Procedure   Therapeutic Procedure/Exercise   Ther. Procedure: strength, endurance, ROM, flexibillity Minutes (71774) 15   Symptoms Noted During/After Treatment fatigue;increased pain   Treatment Detail/Skilled Intervention L QIAN supine ther ex x 10 for AP, QS, GS SBA. Required mod A for HS, hip abd then max A for SLR due to pain. Cues for technique, written handout issued.   Therapeutic Activity   Symptoms Noted During/After Treatment Fatigue;Increased pain   Treatment Detail/Skilled Intervention Supine>sit with HOB raised and use of bed rail mod A to advance LLE due to pain with cues  for sequence and safety. Sit<>stand edge of bed to FWW min A with cues for safe hand placement.   Gait Training   Gait Training Minutes (12634) 10   Symptoms Noted During/After Treatment (Gait Training) fatigue;increased pain   Treatment Detail/Skilled Intervention Educated on WBAT, safe use of FWW with gait, turns and approach to chair to sit. Very slow, step-to gait with multiple standing rest breaks due to pain, weakness, fatigue. Unable to place L foot flat due to pain.   Distance in Feet 15'x1   [place] Level (Gait Training) minimum assist (75% patient effort)   Physical Assistance Level (Gait Training) 1 person assist   Weight Bearing (Gait Training) weight-bearing as tolerated   Assistive Device (Gait Training) rolling walker   Pattern Analysis (Gait Training) other (see comments)  (step-to gait with short, shuffled steps. Difficulty placing weight through LLE)   Gait Analysis Deviations decreased nolvia;increased time in double stance;decreased step length;decreased toe-to-floor clearance;decreased weight-shifting ability   Impairments (Gait Analysis/Training) pain;ROM decreased;strength decreased   PT Discharge Planning   PT Plan L QIAN protocol, stairs   PT Discharge Recommendation (DC Rec) (S)  other (see comments)  (defer to ortho care team)   PT Brief overview of current status Amb 15' with FWW min A. Limited by pain   PT Equipment Needed at Discharge walker, rolling   Total Session Time   Timed Code Treatment Minutes 25   Total Session Time (sum of timed and untimed services) 40

## 2024-02-07 NOTE — ANESTHESIA CARE TRANSFER NOTE
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    Anesthesia Type:   Spinal     Note:    Oropharynx: oropharynx clear of all foreign objects and spontaneously breathing  Level of Consciousness: awake and drowsy  Oxygen Supplementation: face mask  Level of Supplemental Oxygen (L/min / FiO2): 5  Independent Airway: airway patency satisfactory and stable  Dentition: dentition unchanged  Vital Signs Stable: post-procedure vital signs reviewed and stable  Report to RN Given: handoff report given  Patient transferred to: PACU    Handoff Report: Identifed the Patient, Identified the Reponsible Provider, Reviewed the pertinent medical history, Discussed the surgical course, Reviewed Intra-OP anesthesia mangement and issues during anesthesia, Set expectations for post-procedure period and Allowed opportunity for questions and acknowledgement of understanding      Vitals:  Vitals Value Taken Time   /65 02/07/24 0905   Temp 36.7  C (98.1  F) 02/07/24 0905   Pulse 95 02/07/24 0906   Resp 16 02/07/24 0906   SpO2 100 % 02/07/24 0906   Vitals shown include unfiled device data.    Electronically Signed By: RONAL Salas CRNA  February 7, 2024  9:07 AM

## 2024-02-07 NOTE — OP NOTE
[facility]    DATE OF SERVICE: 2/7/2024    PREOPERATIVE DIAGNOSIS: Left hip painful hemiarthroplasty    POSTOPERATIVE DIAGNOSIS: Left hip painful hemiarthroplasty    OPERATION: Conversion of bipolar hemiarthroplasty to total hip arthroplasty    ANESTHESIA: Spinal.     PREP: Routine.     SURGEON: Eduin Yousif MD.     ASSISTANT: Maciel Faustin PA-C.     Indications: Gaby is a 83-year-old woman who previously had a femoral neck fracture treated with a bipolar hemiarthroplasty.  She is never done very well.  We had workup for infection was unremarkable.  This was done by another surgeon.  I felt that her pain is probably coming from her bipolar head acetabular arthroplasty.  Her stem was cemented and appeared to have no evidence of loosening.  We talked about conversion to hip arthroplasty.  The patient's pain persisted and that is our plan.  The risks and benefits have been discussed.    Operation: Patient brought the operating placed supine given a spinal anesthetic as well as perioperative antibiotics and IV TXA.  She was then placed in the right lateral decubitus position all bony problems well-padded the left hip was then prepped draped in sterile fashion appropriate timeout was completed.    Findings: Stem appeared to be stable without evidence of any motion, significant heterotopic scarring, relatively large gouge in the superior posterior acetabulum.    The previous incision was opened up carried down to the fascia was split in line with the incision bleeding was close electrocautery an East-West retractor was then placed.  She had a fair amount of hypertrophic scarring which I excised.  I then elevated the soft tissues off of the posterior trochanter.  I encountered multiple Ethibond sutures.  The bipolar head was then identified and I carefully excised soft tissue and dislocated that.  The bipolar head was then removed without any difficulty.  There is no concerns  identified.  This was an 8.5 mm in length head.    Prior to dislocating the hip I did place a pin proximal to the wound and also a romie in the trochanter for offset leg length calculations.    I then removed soft tissue around the proximal part of the femur.  There was no evidence of any loosening of the femoral component.    I then placed a retractor anteriorly and inferiorly acetabulum.  It was somewhat difficult because of the trunnion but I was able to get adequate soft tissue release.  I then visualized the acetabulum and there was a relatively chronic appearing gouge which was maybe 6 mm in width by about 12 mm extending from the mid aspect of the acetabulum posteriorly.  I do think is probably the etiology of the patient's pain.  She also had a labral tear which I excised.    I then carefully reamed with attempting not to elevate the acetabulum to 47 mm diameter.  She had a relatively small acetabulum.  I then impacted a multihole Monroe cup in the appropriate of anteversion inclination which was stable.  I placed 3 screws 2 in the upper outer quadrant 1 slightly medially without any concerns.  Initially and impacted a +4 neutral liner.  However when trialing I felt that was not adequate so I went ahead and change that to a +4 face changing liner.  After multiple attempts at gaining stability ultimately met with a 13 x 32 mm head.  It did have a skirt but with that I was still about a millimeter too short in the office that was millimeters to less and I was overall happy with the stability with no anterior stability.  And at 9090 there is about 60 degrees of motion for any subluxation.  There did not appear to be any impingement on the shoulder of the head.    The trial was then removed I use Aricept throughout the wound I impacted a 32+13 cobalt chrome head.  I reduced the hip.  I then was able to repair the soft tissues posteriorly including the capsule with a FiberWire suture multiply.  The pin was then  removed proximally and used periarticular injection in the soft tissues.  The fascia of the STRATAFIX suture 2-0 Vicryl Monocryl stitch the patient was then placed a sterile compressive dressing brought to recovery stable condition all sponge and counts correct no intraoperative complication defined blood loss was approximately 200 cc the patient weightbearing as tolerated with posterior hip precautions.  We will get postoperative x-rays.  There were no specimens taken and no complication identified.        Eduin Yousif MD

## 2024-02-07 NOTE — INTERVAL H&P NOTE
I have reviewed the virtual H&P that is linked to this encounter. The physical exam performed by anesthesia during this surgical encounter serves as the physical portion of that the virtual H&P. There are no significant changes from that history and physical as noted.    Clinical Conditions Present on Arrival:  Clinically Significant Risk Factors Present on Admission                 # Drug Induced Platelet Defect: home medication list includes an antiplatelet medication

## 2024-02-08 ENCOUNTER — APPOINTMENT (OUTPATIENT)
Dept: OCCUPATIONAL THERAPY | Facility: CLINIC | Age: 84
DRG: 468 | End: 2024-02-08
Attending: ORTHOPAEDIC SURGERY
Payer: COMMERCIAL

## 2024-02-08 ENCOUNTER — APPOINTMENT (OUTPATIENT)
Dept: PHYSICAL THERAPY | Facility: CLINIC | Age: 84
DRG: 468 | End: 2024-02-08
Attending: ORTHOPAEDIC SURGERY
Payer: COMMERCIAL

## 2024-02-08 LAB
GLUCOSE SERPL-MCNC: 110 MG/DL (ref 70–99)
HGB BLD-MCNC: 12.7 G/DL (ref 11.7–15.7)

## 2024-02-08 PROCEDURE — 97110 THERAPEUTIC EXERCISES: CPT | Mod: GP

## 2024-02-08 PROCEDURE — 99222 1ST HOSP IP/OBS MODERATE 55: CPT | Performed by: NURSE PRACTITIONER

## 2024-02-08 PROCEDURE — 250N000013 HC RX MED GY IP 250 OP 250 PS 637: Performed by: NURSE PRACTITIONER

## 2024-02-08 PROCEDURE — 36415 COLL VENOUS BLD VENIPUNCTURE: CPT | Performed by: ORTHOPAEDIC SURGERY

## 2024-02-08 PROCEDURE — 82947 ASSAY GLUCOSE BLOOD QUANT: CPT | Performed by: ORTHOPAEDIC SURGERY

## 2024-02-08 PROCEDURE — 250N000013 HC RX MED GY IP 250 OP 250 PS 637: Performed by: FAMILY MEDICINE

## 2024-02-08 PROCEDURE — 97116 GAIT TRAINING THERAPY: CPT | Mod: GP

## 2024-02-08 PROCEDURE — 99232 SBSQ HOSP IP/OBS MODERATE 35: CPT | Performed by: FAMILY MEDICINE

## 2024-02-08 PROCEDURE — 250N000013 HC RX MED GY IP 250 OP 250 PS 637: Performed by: ORTHOPAEDIC SURGERY

## 2024-02-08 PROCEDURE — 97535 SELF CARE MNGMENT TRAINING: CPT | Mod: GO

## 2024-02-08 PROCEDURE — 250N000009 HC RX 250: Performed by: NURSE PRACTITIONER

## 2024-02-08 PROCEDURE — 97166 OT EVAL MOD COMPLEX 45 MIN: CPT | Mod: GO

## 2024-02-08 PROCEDURE — 120N000001 HC R&B MED SURG/OB

## 2024-02-08 PROCEDURE — 85018 HEMOGLOBIN: CPT | Performed by: ORTHOPAEDIC SURGERY

## 2024-02-08 RX ORDER — HYDROMORPHONE HYDROCHLORIDE 4 MG/1
2-4 TABLET ORAL EVERY 4 HOURS PRN
Status: DISCONTINUED | OUTPATIENT
Start: 2024-02-08 | End: 2024-02-09 | Stop reason: HOSPADM

## 2024-02-08 RX ORDER — METHOCARBAMOL 500 MG/1
250 TABLET ORAL EVERY 6 HOURS PRN
Status: DISCONTINUED | OUTPATIENT
Start: 2024-02-08 | End: 2024-02-09 | Stop reason: HOSPADM

## 2024-02-08 RX ORDER — ASPIRIN 81 MG/1
81 TABLET ORAL
COMMUNITY
Start: 2024-03-10

## 2024-02-08 RX ORDER — HYDROXYZINE HYDROCHLORIDE 10 MG/1
10 TABLET, FILM COATED ORAL 3 TIMES DAILY PRN
Qty: 25 TABLET | Refills: 0 | Status: SHIPPED | OUTPATIENT
Start: 2024-02-08

## 2024-02-08 RX ORDER — AMOXICILLIN 250 MG
1 CAPSULE ORAL 2 TIMES DAILY
Qty: 20 TABLET | Refills: 0 | Status: SHIPPED | OUTPATIENT
Start: 2024-02-08

## 2024-02-08 RX ORDER — LIDOCAINE 50 MG/G
OINTMENT TOPICAL 4 TIMES DAILY
Status: DISCONTINUED | OUTPATIENT
Start: 2024-02-08 | End: 2024-02-09 | Stop reason: HOSPADM

## 2024-02-08 RX ORDER — ACETAMINOPHEN 325 MG/1
975 TABLET ORAL EVERY 8 HOURS
Qty: 27 TABLET | Refills: 0 | Status: SHIPPED | OUTPATIENT
Start: 2024-02-08

## 2024-02-08 RX ORDER — GABAPENTIN 100 MG/1
200 CAPSULE ORAL AT BEDTIME
Status: DISCONTINUED | OUTPATIENT
Start: 2024-02-08 | End: 2024-02-09 | Stop reason: HOSPADM

## 2024-02-08 RX ORDER — ASPIRIN 81 MG/1
81 TABLET ORAL 2 TIMES DAILY
Qty: 60 TABLET | Refills: 0 | Status: SHIPPED | OUTPATIENT
Start: 2024-02-08 | End: 2024-03-09

## 2024-02-08 RX ORDER — HYDROXYZINE HYDROCHLORIDE 10 MG/1
10 TABLET, FILM COATED ORAL 3 TIMES DAILY PRN
Status: DISCONTINUED | OUTPATIENT
Start: 2024-02-08 | End: 2024-02-08

## 2024-02-08 RX ORDER — POLYETHYLENE GLYCOL 3350 17 G/17G
17 POWDER, FOR SOLUTION ORAL DAILY
COMMUNITY
Start: 2024-02-08 | End: 2024-02-22

## 2024-02-08 RX ORDER — OXYCODONE HYDROCHLORIDE 5 MG/1
5-10 TABLET ORAL EVERY 4 HOURS PRN
Qty: 25 TABLET | Refills: 0 | Status: SHIPPED | OUTPATIENT
Start: 2024-02-08 | End: 2024-02-09

## 2024-02-08 RX ORDER — HYDROXYZINE HYDROCHLORIDE 10 MG/1
10 TABLET, FILM COATED ORAL EVERY 8 HOURS PRN
Status: DISCONTINUED | OUTPATIENT
Start: 2024-02-08 | End: 2024-02-09 | Stop reason: HOSPADM

## 2024-02-08 RX ORDER — HYDROMORPHONE HYDROCHLORIDE 4 MG/1
2-4 TABLET ORAL
Status: DISCONTINUED | OUTPATIENT
Start: 2024-02-08 | End: 2024-02-08

## 2024-02-08 RX ADMIN — ACETAMINOPHEN 975 MG: 325 TABLET ORAL at 22:46

## 2024-02-08 RX ADMIN — OXYCODONE HYDROCHLORIDE 10 MG: 5 TABLET ORAL at 09:36

## 2024-02-08 RX ADMIN — GABAPENTIN 200 MG: 100 CAPSULE ORAL at 21:35

## 2024-02-08 RX ADMIN — METHOCARBAMOL 250 MG: 500 TABLET ORAL at 19:40

## 2024-02-08 RX ADMIN — OXYCODONE HYDROCHLORIDE 10 MG: 5 TABLET ORAL at 04:26

## 2024-02-08 RX ADMIN — DICLOFENAC 2 G: 10 GEL TOPICAL at 19:41

## 2024-02-08 RX ADMIN — ASPIRIN 81 MG: 81 TABLET, COATED ORAL at 21:35

## 2024-02-08 RX ADMIN — PANTOPRAZOLE SODIUM 40 MG: 40 TABLET, DELAYED RELEASE ORAL at 06:03

## 2024-02-08 RX ADMIN — ATORVASTATIN CALCIUM 5 MG: 10 TABLET, FILM COATED ORAL at 21:35

## 2024-02-08 RX ADMIN — LIDOCAINE: 50 OINTMENT TOPICAL at 18:34

## 2024-02-08 RX ADMIN — HYDROXYZINE HYDROCHLORIDE 10 MG: 10 TABLET ORAL at 12:31

## 2024-02-08 RX ADMIN — DICLOFENAC 2 G: 10 GEL TOPICAL at 15:17

## 2024-02-08 RX ADMIN — HYDROMORPHONE HYDROCHLORIDE 4 MG: 4 TABLET ORAL at 12:30

## 2024-02-08 RX ADMIN — ASPIRIN 81 MG: 81 TABLET, COATED ORAL at 09:38

## 2024-02-08 RX ADMIN — SENNOSIDES AND DOCUSATE SODIUM 1 TABLET: 8.6; 5 TABLET ORAL at 09:38

## 2024-02-08 RX ADMIN — SENNOSIDES AND DOCUSATE SODIUM 1 TABLET: 8.6; 5 TABLET ORAL at 21:35

## 2024-02-08 RX ADMIN — HYDROMORPHONE HYDROCHLORIDE 2 MG: 4 TABLET ORAL at 23:22

## 2024-02-08 RX ADMIN — HYDROXYZINE HYDROCHLORIDE 10 MG: 10 TABLET ORAL at 23:22

## 2024-02-08 RX ADMIN — ACETAMINOPHEN 975 MG: 325 TABLET ORAL at 06:03

## 2024-02-08 RX ADMIN — ACETAMINOPHEN 975 MG: 325 TABLET ORAL at 15:17

## 2024-02-08 RX ADMIN — LIDOCAINE: 50 OINTMENT TOPICAL at 21:38

## 2024-02-08 ASSESSMENT — ACTIVITIES OF DAILY LIVING (ADL)
ADLS_ACUITY_SCORE: 28
IADL_COMMENTS: SPOUSE ABLE TO ASSIST WITH IADLS
ADLS_ACUITY_SCORE: 28

## 2024-02-08 NOTE — PROGRESS NOTES
Physical Therapy Discharge Summary    Reason for therapy discharge:    All goals and outcomes met, no further needs identified.    Progress towards therapy goal(s). See goals on Care Plan in [facility] electronic health record for goal details.  Goals met    Therapy recommendation(s):    Continue home exercise program.

## 2024-02-08 NOTE — PROGRESS NOTES
02/08/24 0730   Appointment Info   Signing Clinician's Name / Credentials (OT) Holger Tapia,  OTR/L   Quick Adds   Quick Adds Certification   Living Environment   People in Home spouse   Current Living Arrangements house;other (see comments)  (bedroom and bathroom upstairs.  1/2 bath on main floor.)   Living Environment Comments WIS, GB, shower chair.  RTS with GB.   Self-Care   Usual Activity Tolerance good   Current Activity Tolerance moderate   Instrumental Activities of Daily Living (IADL)   IADL Comments Spouse able to assist with IADLs   General Information   Patient/Family Therapy Goal Statement (OT) To return home with family   Additional Occupational Profile Info/Pertinent History of Current Problem Adm for L QIAN.  PMH:  Bipolar arthroplasty L hip Mar 2023, dylip, GERD, HTN, Osteo, R BBB   Left Lower Extremity (Weight-bearing Status) weight-bearing as tolerated (WBAT)   Cognitive Status Examination   Follows Commands WNL   Visual Perception   Visual Impairment/Limitations   (bilat HA currently wearing.)   Bed Mobility   Bed Mobility supine-sit;sit-supine   Supine-Sit [place] (Bed Mobility) supervision;verbal cues   Sit-Supine [place] (Bed Mobility) supervision;verbal cues   Transfers   Transfers bed-chair transfer;sit-stand transfer;toilet transfer   Transfer Skill: Bed to Chair/Chair to Bed   Bed-Chair [place] (Transfers) supervision;verbal cues   Assistive Device (Bed-Chair Transfers) rolling walker   Sit-Stand Transfer   Sit-Stand [place] (Transfers) supervision;verbal cues   Assistive Device (Sit-Stand Transfers) walker, front-wheeled   Toilet Transfer   Type (Toilet Transfer) sit-stand;stand-sit   [place] Level (Toilet Transfer) supervision;verbal cues   Assistive Device (Toilet Transfer) raised toilet seat;grab bars/safety frame   Balance   Balance Assessment standing dynamic balance   Standing Balance: Dynamic supervision;verbal cues   Position/Device Used, Standing  Balance walker, front-wheeled   Activities of Daily Living   BADL Assessment/Intervention upper body dressing;lower body dressing   Upper Body Dressing Assessment/Training   Position (Upper Body Dressing) supported sitting   [place] Level (Upper Body Dressing) upper body dressing skills;don;pull-over garment;independent   Lower Body Dressing Assessment/Training   Position (Lower Body Dressing) supported sitting;supported standing   [place] Level (Lower Body Dressing) lower body dressing skills;don;pants/bottoms;shoes/slippers;socks;supervision;verbal cues   Clinical Impression   Criteria for Skilled Therapeutic Interventions Met (OT) Yes, treatment indicated   OT Diagnosis Decreased indep with ADLs and trsfs due to L QIAN   OT Problem List-Impairments impacting ADL problems related to;mobility   Assessment of Occupational Performance 3-5 Performance Deficits   Identified Performance Deficits Dressing, toileting, bathing, G/H and trsfs   Planned Therapy Interventions (OT) ADL retraining   Clinical Decision Making Complexity (OT) detailed assessment/moderate complexity   Risk & Benefits of therapy have been explained evaluation/treatment results reviewed;participants included;patient   OT Total Evaluation Time   OT Eval, Moderate Complexity Minutes (70477) 15   Therapy Certification   Medical Diagnosis L QIAN   Start of Care Date 02/08/24   Certification date from 02/08/24   Certification date to 03/08/24   OT Goals   Therapy Frequency (OT) One time eval and treatment   OT Goals Lower Body Dressing;Transfers   OT: Lower Body Dressing Modified independent;using adaptive equipment;within precautions   OT: Transfer Modified independent;with assistive device;within precautions   Interventions   Interventions Quick Adds Self-Care/Home Management   Self-Care/Home Management   Self-Care/Home Mgmt/ADL, Compensatory, Meal Prep Minutes (58818) 30   Symptoms Noted During/After Treatment (Meal Preparation/Planning  Training) none   Treatment Detail/Skilled Intervention Pt sitting in chair when therapist arrived. Pt's spouse present and supportive. Reviewed with Pt and spouse the QIAN precautions. Pt and spouse verbalized understanding. Worked on FB dressing. Pt was indep with U/B dressing while sitting. Worked on L/B dressing with SBA for VC for sequencing and use of AE. By end of session, Pt was mod indep with L/B dressing. Worked on room trsfs using the FWW to the bed, chair, toilet, WIS, BR sink. Initially needing SBA for VC for correct hand placement. By end of session, Pt was mod indep using FWW. Pt was shown and practiced bed mobility using pillows between legs when resting on thier R side. Therapist demo the car trsf and kitchen mobility. Pt and spouse understood. At end of session, Pt was sitting in the chair with alarm on and call light within reach. RN aware.   OT Discharge Planning   OT Plan D/C OT   OT Discharge Recommendation (DC Rec) other (see comments)   OT Rationale for DC Rec Pt will have assist from family at home.   OT Brief overview of current status Pt has met his OT goals.  Mod indep with trsfs and ADLs   Total Session Time   Timed Code Treatment Minutes 30   Total Session Time (sum of timed and untimed services) 45    [facility]  OUTPATIENT OCCUPATIONAL THERAPY  EVALUATION  PLAN OF TREATMENT FOR OUTPATIENT REHABILITATION  (COMPLETE FOR INITIAL CLAIMS ONLY)  Patient's Last Name, First Name, M.I.  YOB: 1940  Cristiane Birmingham                          Provider's Name  [facility] Medical Record No.  [id number]                             Onset Date:      Start of Care Date:  (P) 02/08/24   Type:     ___PT   _X_OT   ___SLP Medical Diagnosis:  L QIAN                    OT Diagnosis:  Decreased indep with ADLs and trsfs due to L QIAN Visits from SOC:  1     See note for plan of treatment, functional goals and certification details    I  CERTIFY THE NEED FOR THESE SERVICES FURNISHED UNDER        THIS PLAN OF TREATMENT AND WHILE UNDER MY CARE     (Physician co-signature of this document indicates review and certification of the therapy plan).

## 2024-02-08 NOTE — PROGRESS NOTES
Care Management Discharge Note    Discharge Date: 02/08/2024       Discharge Disposition: Home    Discharge Services: None    Discharge DME: None    Discharge Transportation:  Friend/Family    Private pay costs discussed: Not applicable    Does the patient's insurance plan have a 3 day qualifying hospital stay waiver?  Yes     Which insurance plan 3 day waiver is available? Alternative insurance waiver    Will the waiver be used for post-acute placement? No    PAS Confirmation Code:    Patient/family educated on Medicare website which has current facility and service quality ratings:      Education Provided on the Discharge Plan: Yes (AVS per bedside RN)  Persons Notified of Discharge Plans: Pt  Patient/Family in Agreement with the Plan:      Handoff Referral Completed: Yes    Additional Information:  Pt to discharge to home. Family/Friend to provide transport.     Pt to follow up in clinic with Surgeon Team.     No CM needs for discharge.     Manuel Martinez RN

## 2024-02-08 NOTE — PROGRESS NOTES
"[facility] Orthopaedics Progress Note      Post-operative Day: 1 Day Post-Op    Procedure(s):  LEFT HIP CONVERSION BIPOLAR TO TOTAL HIP ARTHROPLASTY POSTERIOR APPROACH      Subjective: Patient seen up resting in bedside chair, endorses pain to left hip worse with activity. Tolerating a regular diet with no nausea or vomiting. Denies feeling lightheaded or dizzy. Voiding without difficulty.    Pain: Moderate/Severe  Chest pain, SOB:  No      Objective:  Blood pressure (!) 160/69, pulse 89, temperature 97.4  F (36.3  C), temperature source Oral, resp. rate 16, height 1.651 m (5' 5\"), weight 67.1 kg (148 lb), SpO2 95%.    Patient Vitals for the past 24 hrs:   BP Temp Temp src Pulse Resp SpO2   02/08/24 0830 (!) 160/69 97.4  F (36.3  C) Oral 89 16 95 %   02/07/24 2358 139/65 97.9  F (36.6  C) Oral 92 14 93 %   02/07/24 1617 (!) 170/79 -- -- 71 -- --   02/07/24 1500 (!) 193/85 97.5  F (36.4  C) Oral 82 16 98 %   02/07/24 1426 -- -- -- -- -- 99 %   02/07/24 1400 (!) 163/72 97.8  F (36.6  C) Oral 74 16 95 %   02/07/24 1300 121/58 98  F (36.7  C) Axillary 86 16 95 %   02/07/24 1230 118/56 97.3  F (36.3  C) Oral 84 16 95 %   02/07/24 1200 117/58 97.3  F (36.3  C) Oral 87 17 95 %   02/07/24 1130 -- -- -- -- -- 95 %       Wt Readings from Last 4 Encounters:   02/07/24 67.1 kg (148 lb)   03/27/23 67.1 kg (148 lb)   03/19/23 67.5 kg (148 lb 12.8 oz)   03/16/23 70.8 kg (156 lb)       General: Alert and orientated, NAD  Respiratory: Non-labored breathing on RA  LLE motor function, sensation, and circulation intact   Yes, moves all other extremities with ease and purpose   Wound status: incisions are clean dry and intact. Yes  Calf tenderness: Bilateral  No    Pertinent Labs   Lab Results: personally reviewed.     Recent Labs   Lab Test 02/08/24  0555 01/09/24  1018 11/15/23  1003 03/12/23  1034 03/11/23  1441 10/12/22  1528   INR  --   --   --   --  0.94  --    WBC  --  7.4 9.1  --  13.4*  --    HGB 12.7 14.1 14.6   < > 14.9  --  "   HCT  --  42.9 45.2  --  43.6  --    MCV  --  96 97  --  93  --    PLT  --  252 262  --  235  --    NA  --  141  --   --  139 140    < > = values in this interval not displayed.       Plan:   Anticoagulation protocol: Aspirin 81 mg BID  x 30  days  Pain medications:  scopainmedication: oxycodone and tylenol, Will add on Atarax.  reviewed appears has taken PO Dilaudid in the past will trial switching to Dilaudid in an attempt to achieve better pain control   Weight bearing status:  WBAT  Disposition:  Home pending adequate pain control and clearance from therapies and hospital medicine    Continue cares and rehabilitation     Report completed by:  RONAL Miles CNP  Date: 2/8/2024  Time: 11:13 AM

## 2024-02-08 NOTE — PLAN OF CARE
Goal Outcome Evaluation:         Problem: Adult Inpatient Plan of Care  Goal: Optimal Comfort and Wellbeing  Outcome: Progressing  Intervention: Monitor Pain and Promote Comfort  Recent Flowsheet Documentation  Taken 2/7/2024 2300 by Lilly Buchanan RN  Pain Management Interventions:   cold applied   rest   repositioned  Taken 2/7/2024 2015 by Lilly Buchanan, RN  Pain Management Interventions:   medication (see MAR)   cold applied   emotional support       Patient vital signs are at baseline: Yes  Patient able to ambulate as they were prior to admission or with assist devices provided by therapies during their stay:  Yes, up with assist of 1 with gait belt and walker  Patient MUST void prior to discharge:  Yes  Patient able to tolerate oral intake:  Yes  Pain has adequate pain control using Oral analgesics:  Yes, managed with scheduled pain meds, PRN oxycodone and ice application  Does patient have an identified :  Yes  Has goal D/C date and time been discussed with patient:  Yes, plan for discharge today pending therapies

## 2024-02-08 NOTE — CONSULTS
[facility] ACUTE PAIN SERVICE CONSULTATION     Date of Admission:  2/7/2024  Date of Consult (When I saw the patient): 02/08/24     Assessment/Plan:     Cristiane Birmingham is a 83 year old female who was admitted on 2/7/2024.  Pain team was asked to see the patient for operative pain. Admitted for planned procedure. History of failed total hip arthroplasty, closed fracture of the left femur, closed intertrochanteric fracture of the left hip, hypertension, degenerative arthritis, diverticulitis, GERD, hyperlipidemia, hypothyroidism.  Describes pain as 3-4/10 and sharp, aching, tight, burning in the left hip and down the lateral left thigh and into the left groin. The patient denies nausea, vomiting, chest pain, shortness of breath, diarrhea, dizziness. The patient reports passing flatus but no bowel movement yet. The patient does not smoke and denies chemical dependency history.     Post op day: 1 Day Post-Op. LEFT HIP CONVERSION BIPOLAR TO TOTAL HIP ARTHROPLASTY POSTERIOR APPROACH     PLAN:   1) Pain is consistent with postoperative pain. Labs and imaging indicated: I have personally reviewed pertinent notes, labs, tests, and radiologic imaging in patient's chart. Treatment plan includes multimodal pain approach, Hospital Medicine Service for medical management, orthopedics, PT, OT. Patient educated regarding multimodal pain approach, medications as listed below. Patient is understanding of the plan. All questions and concerns addressed to patient's satisfaction.   2)Multimodal Medication Therapy  Topical: add lidocaine ointment alternating with voltaren gel   NSAID'S: None given age, history of diverticulitis  Muscle Relaxants: add robaxin 250 mg q6h prn  Adjuvants: Acetaminophen 1 g every 8 hours, add Gabapentin 200 mg at bedtime   Antidepressants/anxiolytics: Hydroxyzine 10 mg 3 times daily as needed  Opioids: Discontinue oxycodone, trial Dilaudid 2-4 mg every 4 hours as needed  IV Pain medication: Dilaudid  0.2-0.4 mg every 2 hours as needed - discontinue   3)Non-medication interventions: Ice, rest  4)Constipation Prophylaxis: Scheduled and prn:     -Opioid prescriber has been none recent  -MN  pulled from system on 2/8/2024. This indicates 11 prescriptions for hydromorphone filled from March 2023 through September 2023.  9/1/2023 hydromorphone 4 mg #28 fill for 7 days primary care provider Wes Lyon MD -same on 8/11/2023 7/20/2023 hydromorphone 2 mg #28 fill for 7 days primary care provider Wes Lyon MD -same on 6/27/23  Discharge Recommendations - We recommend prescribing the following at the time of discharge: TBD.  Per orthopedics.     History of Present Illness (HPI):       Cristiane Birmingham is a 83 year old female who presented for a planned procedure.  Past medical history as above. The pain is reported to be acute post op pain, located in the left hip and left groin, and it does radiate to left lateral thigh.  Current pain is rated at 3-4/10 and goal is 0-2/10.      Per MN  review, the patient does not have an opioid tolerance. Opioid induced side effects noted and include: none.      Reviewed medical record, labs, imaging, ED note, and care everywhere.     Past pain treatments have included: Hydromorphone, APAP, NSAIDs    Home pain medications/psych medications/anticoagulation medications include: Acetaminophen, aspirin 81 mg 4 times weekly      Medical History   PAST MEDICAL HISTORY:   Past Medical History:   Diagnosis Date    Benign essential hypertension 11/04/2020    Breast cyst 01/01/2014    Colon polyp     Degenerative arthritis 11/04/2020    Diverticulitis of colon 11/04/2020    Dyspnea on exertion 02/06/2020    Gastroesophageal reflux disease 11/04/2020    History of hip fracture 03/2023    Left Hip    History of skin cancer     on forehead    Hyperlipidemia 11/04/2020    Hypothyroidism 11/04/2020    Inverted nipple     hx of inverted nipples    Palpitations 02/06/2020    PVC's  (premature ventricular contractions) 2020    Senile incipient cataract 2020    Snores     Varicose veins of both lower extremities 2020       PAST SURGICAL HISTORY:   Past Surgical History:   Procedure Laterality Date    ANKLE FRACTURE SURGERY Right 2022    BIOPSY BREAST Left 2009    BIOPSY BREAST Right 2014    benign    BREAST CYST ASPIRATION Right 2014    CATARACT EXTRACTION       SECTION      DILATION AND CURETTAGE       and     OPEN REDUCTION INTERNAL FIXATION HIP BIPOLAR Left 2023    Procedure: HEMIARTHROPLASTY, HIP, BIPOLAR;  Surgeon: Khadar Kennedy MD;  Location: [facility] [place] OR    RHINOPLASTY  1968    SOFT TISSUE SURGERY  2019    Skin Cancer removal on forehead    STRIP VEIN Bilateral     Legs    TONSILLECTOMY  1950    TUBAL LIGATION         FAMILY HISTORY:   Family History   Problem Relation Age of Onset    Pancreatic Cancer Mother 92.00    Colon Cancer Sister 70.00    Colon Cancer Maternal Grandfather     Prostate Cancer Paternal Grandfather     Breast Cancer Cousin         paternal half cousin, onset in 30s    Breast Cancer Cousin         paternal half cousin, onset in 50s    Breast Cancer Cousin 40.00        paternal half cousin    Skin Cancer Brother     Brain Cancer Maternal Uncle          at 39    Colon Cancer Paternal Uncle     Stomach Cancer Cousin 50.00        paternal half cousin    Cancer Cousin         paternal half cousin, sinus cancer    Breast Cancer Cousin         maternal first-cousin, onset 40s    Lung Cancer Cousin         maternal first-cousin,  around 50    Lung Cancer Cousin         maternal first-cousin    Lung Cancer Cousin         materanl first-cousin    Lung Cancer Cousin         maternal first-cousin,  at 39    Cancer Maternal Uncle         throat cancer    Cancer Maternal Uncle         type not specified    Skin Cancer Brother     Colon Cancer Other         paternal half uncle     Pancreatic Cancer Other         paternal half uncle    Colon Cancer Father         onset in 80s       SOCIAL HISTORY:   Social History     Tobacco Use    Smoking status: Never    Smokeless tobacco: Never   Substance Use Topics    Alcohol use: Not Currently        HEALTH & LIFESTYLE PRACTICES  Tobacco:  reports that she has never smoked. She has never used smokeless tobacco.  Alcohol:  reports that she does not currently use alcohol.  Illicit drugs:  reports no history of drug use.    Allergies  Allergies   Allergen Reactions    Fosamax [Alendronate] Other (See Comments)     shakiness    Metronidazole Dizziness     Weakness    Sulfa Antibiotics GI Disturbance       Problem List  Patient Active Problem List    Diagnosis Date Noted    Failed total hip arthroplasty  (H24) 02/07/2024     Priority: Medium    Fall, initial encounter 03/11/2023     Priority: Medium    Closed fracture of neck of left femur, initial encounter (H) 03/11/2023     Priority: Medium    Closed intertrochanteric fracture of hip, left, initial encounter (H) 03/11/2023     Priority: Medium    Benign essential hypertension 11/04/2020     Priority: Medium    Degenerative arthritis 11/04/2020     Priority: Medium    Diverticulitis of colon 11/04/2020     Priority: Medium    Gastroesophageal reflux disease 11/04/2020     Priority: Medium    Hyperlipidemia 11/04/2020     Priority: Medium    Hypothyroidism 11/04/2020     Priority: Medium    Senile incipient cataract 11/04/2020     Priority: Medium    Varicose veins of both lower extremities 11/04/2020     Priority: Medium    PVC's (premature ventricular contractions) 02/06/2020     Priority: Medium    Dyspnea on exertion 02/06/2020     Priority: Medium    Palpitations 02/06/2020     Priority: Medium       Prior to Admission Medications   Medications Prior to Admission   Medication Sig Dispense Refill Last Dose    ascorbic acid, vitamin C, (VITAMIN C) 1000 MG tablet Take 1,000 mg by mouth four times a week  "Monday, Wednesday, Friday, Sunday   Past Week at AM    atorvastatin (LIPITOR) 10 MG tablet [ATORVASTATIN (LIPITOR) 10 MG TABLET] Take 5 mg by mouth at bedtime.   2/6/2024 at PM    calcium carbonate (CALCIUM 600 ORAL) Take 600 mg by mouth four times a week Monday, Wednesday, Friday, Sunday   Past Week at AM    levothyroxine (SYNTHROID, LEVOTHROID) 100 MCG tablet Take 100 mcg by mouth four times a week Monday, Wednesday, Friday, Sunday 2/7/2024 at AM    Magnesium 300 MG CAPS Take 1 capsule by mouth four times a week Monday, Wednesday, Friday, Sunday   Past Week at AM    Multiple Vitamins-Minerals (HAIR SKIN AND NAILS FORMULA) TABS Take 1 tablet by mouth four times a week Monday, Wednesday, Friday, Sunday   Past Week at AM    Multiple Vitamins-Minerals (OCUVITE PRESERVISION PO) Take 1 tablet by mouth four times a week Monday, Wednesday, Friday, Sunday   Past Week at AM    multivitamin-minerals-lutein (CENTRUM SILVER) tablet Take 1 tablet by mouth four times a week Monday, Wednesday, Friday, Sunday   Past Week at AM    omega 3-dha-epa-fish oil (FISH OIL) 1,000 mg (120 mg-180 mg) cap Take 1 capsule by mouth four times a week Monday, Wednesday, Friday, Sunday   Past Week at AM    omeprazole (PRILOSEC) 40 MG DR capsule Take 40 mg by mouth four times a week Monday, Wednesday, Friday, Sunday 2/7/2024 at AM    [DISCONTINUED] acetaminophen (TYLENOL) 500 MG tablet Take 500-1,000 mg by mouth every 8 hours as needed for mild pain   Unknown at prn    [DISCONTINUED] aspirin 81 MG EC tablet Take 81 mg by mouth four times a week Monday, Wednesday, Friday, Sunday 1/31/2024 at AM       Review of Systems  Complete ROS reviewed, unless noted in HPI, all other systems reviewed (with patient) and all others found to be negative.      Objective:     Physical Exam:  BP (!) 160/69 (BP Location: Right arm)   Pulse 89   Temp 97.4  F (36.3  C) (Oral)   Resp 16   Ht 1.651 m (5' 5\")   Wt 67.1 kg (148 lb)   SpO2 95%   BMI 24.63 kg/m  "   Weight:   Vitals:    01/29/24 1000 02/07/24 0619   Weight: 68 kg (150 lb) 67.1 kg (148 lb)      Body mass index is 24.63 kg/m .    General Appearance:  Alert, cooperative, pleasant, calm, no distress, appears stated age, resting in bed   Head:  Normocephalic, without obvious abnormality, atraumatic   Eyes:  PERRL, conjunctiva/corneas clear, EOM's intact   ENT/Throat: Lips, mucosa, and tongue normal; teeth and gums normal   Lymph/Neck: Supple, symmetrical, trachea midline   Lungs:   Clear to auscultation bilaterally, respirations unlabored   Cardiovascular/Heart:  Regular rate and rhythm, S1, S2 normal,no murmur, rub or gallop.    Abdomen:   Soft, non-tender, bowel sounds active all four quadrants,  no masses, no organomegaly   Musculoskeletal: Incision is covered   Skin: Skin warm, dry   Neurologic: Alert and oriented X 3, Moves all 4 extremities     Psych: Affect is appropriate      Imaging: Reviewed I have personally reviewed pertinent labs, tests, and radiologic imaging in patient's chart.    Labs: Reviewed I have personally reviewed pertinent labs, tests, and radiologic imaging in patient's chart.        Total time spent 65 minutes with greater than 50% in consultation, education and coordination of care.   Also discussed with RN, orthopedics  Elements of Medical Decision Making as described above. Acute or chronic illness or injury or surgery. High risk therapy including opioids, high risk drug therapy including oral and/or parenteral controlled substances.     Thank you for this consultation.    Beatris VILLEDA, KUMARP-C  Acute Care Pain Management Program [facility]   Monday-Friday 8a-4p   Page via Epic or Mixbook

## 2024-02-08 NOTE — PLAN OF CARE
Problem: Hip Arthroplasty  Goal: Acceptable Pain Control  Outcome: Progressing       Patient vital signs are at baseline: No,  Reason:  VSS,  Bp elevated  Patient able to ambulate as they were prior to admission or with assist devices provided by therapies during their stay:  No,  Reason:  Up with assist of 1, able to take few steps  Patient MUST void prior to discharge:  Yes  Patient able to tolerate oral intake:  Yes  Pain has adequate pain control using Oral analgesics:  No,  Reason: Pt needed IV prn for pain control  Does patient have an identified :  Yes  Has goal D/C date and time been discussed with patient:  Yes, pending therapies, pain control and medical clearance

## 2024-02-08 NOTE — PLAN OF CARE
Problem: Adult Inpatient Plan of Care  Goal: Optimal Comfort and Wellbeing  Outcome: Progressing   Goal Outcome Evaluation:       Patient vital signs are at baseline: Yes  Patient able to ambulate as they were prior to admission or with assist devices provided by therapies during their stay:  Yes, russell short walks d/t pain  Patient MUST void prior to discharge:  Yes  Patient able to tolerate oral intake:  Yes  Pain has adequate pain control using Oral analgesics:  No, cont to rate pain moderate after prn pain pain meds utilized  Does patient have an identified :  Yes  Has goal D/C date and time been discussed with patient:  Yes, pending pain control

## 2024-02-08 NOTE — DISCHARGE SUMMARY
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, initial encounter (H24) [T84.091A]  Failed total hip arthroplasty  (H24) [T84.018A, Z96.649]    Post-Operative Day: 2 Day Post-Op     Reason for admission:  The patient was admitted for the following:Procedure(s) (LRB):  LEFT HIP CONVERSION BIPOLAR TO TOTAL HIP ARTHROPLASTY POSTERIOR APPROACH (Left)    Principal Problem:    Failed total hip arthroplasty  (H24)      Allergies:  Fosamax [alendronate], Metronidazole, and Sulfa antibiotics    Following the procedure noted above the patient was transferred to the post-op floor and started on:    Therapy:  physical therapy and occupational therapy  Anticoagulation Plan: Aspirin 81 mg BID  for 30 days  Pain Management: scopainmedication: dilaudid and tylenol  Weight bearing status: Weight bearing as tolerated     The patient was followed by Orthopedics during the inpatient treatment course:  Complications:  None  Additional consultations:  Hospitalist      Pertinent Labs   Lab Results: personally reviewed.     Recent Labs   Lab Test 02/08/24  0555 01/09/24  1018 11/15/23  1003 03/12/23  1034 03/11/23  1441 10/12/22  1528   INR  --   --   --   --  0.94  --    WBC  --  7.4 9.1  --  13.4*  --    HGB 12.7 14.1 14.6   < > 14.9  --    HCT  --  42.9 45.2  --  43.6  --    MCV  --  96 97  --  93  --    PLT  --  252 262  --  235  --    NA  --  141  --   --  139 140    < > = values in this interval not displayed.          Discharge Information:  Condition at discharge: Stable  Discharge destination:  Discharged to home      Medications at discharge:  Current Discharge Medication List        START taking these medications    Details   hydrOXYzine HCl (ATARAX) 10 MG tablet Take 1 tablet (10 mg) by mouth 3 times daily as needed for itching  Qty: 25 tablet, Refills: 0    Associated Diagnoses: Failure of left total hip arthroplasty, sequela      oxyCODONE (ROXICODONE) 5 MG tablet Take 1-2 tablets (5-10 mg) by mouth every 4 hours as needed for moderate pain  Qty: 25 tablet, Refills: 0    Associated Diagnoses: Failure of left total hip arthroplasty, sequela      polyethylene glycol (MIRALAX) 17 GM/Dose powder Take 17 g by mouth daily for 14 days Hold if loose stools    Associated Diagnoses: Closed fracture of neck of left femur, initial encounter (H)      senna-docusate (SENOKOT-S/PERICOLACE) 8.6-50 MG tablet Take 1 tablet by mouth 2 times daily  Qty: 20 tablet, Refills: 0    Associated Diagnoses: Failure of left total hip arthroplasty, sequela           CONTINUE these medications which have CHANGED    Details   acetaminophen (TYLENOL) 325 MG tablet Take 3 tablets (975 mg) by mouth every 8 hours  Qty: 27 tablet, Refills: 0    Associated Diagnoses: Failure of left total hip arthroplasty, sequela      !! aspirin 81 MG EC tablet Take 1 tablet (81 mg) by mouth 2 times daily for 30 days  Qty: 60 tablet, Refills: 0    Associated Diagnoses: Failure of left total hip arthroplasty, sequela      !! aspirin 81 MG EC tablet Take 1 tablet (81 mg) by mouth four times a week Monday, Wednesday, Friday, Sunday    Comments: May resume on 3/10/24       !! - Potential duplicate medications found. Please discuss with provider.        CONTINUE these medications which have NOT CHANGED    Details   ascorbic acid, vitamin C, (VITAMIN C) 1000 MG tablet Take 1,000 mg by mouth four times a week Monday, Wednesday, Friday, Sunday      atorvastatin (LIPITOR) 10 MG tablet [ATORVASTATIN (LIPITOR) 10 MG TABLET] Take 5 mg by mouth at bedtime.      calcium carbonate (CALCIUM  600 ORAL) Take 600 mg by mouth four times a week Monday, Wednesday, Friday, Sunday      levothyroxine (SYNTHROID, LEVOTHROID) 100 MCG tablet Take 100 mcg by mouth four times a week Monday, Wednesday, Friday, Sunday      Magnesium 300 MG CAPS Take 1 capsule by mouth four times a week Monday, Wednesday, Friday, Sunday      !! Multiple Vitamins-Minerals (HAIR SKIN AND NAILS FORMULA) TABS Take 1 tablet by mouth four times a week Monday, Wednesday, Friday, Sunday      !! Multiple Vitamins-Minerals (OCUVITE PRESERVISION PO) Take 1 tablet by mouth four times a week Monday, Wednesday, Friday, Sunday      !! multivitamin-minerals-lutein (CENTRUM SILVER) tablet Take 1 tablet by mouth four times a week Monday, Wednesday, Friday, Sunday      omega 3-dha-epa-fish oil (FISH OIL) 1,000 mg (120 mg-180 mg) cap Take 1 capsule by mouth four times a week Monday, Wednesday, Friday, Sunday      omeprazole (PRILOSEC) 40 MG DR capsule Take 40 mg by mouth four times a week Monday, Wednesday, Friday, Sunday       !! - Potential duplicate medications found. Please discuss with provider.                     Follow-Up Care:  Patient should be seen in the office in 14 days by the Orthopedic Surgeon/Physician Assistant.  Call [phone] for appointment or questions.    It was my pleasure to take care of the above patient.  RONAL Miles CNP

## 2024-02-09 VITALS
DIASTOLIC BLOOD PRESSURE: 85 MMHG | OXYGEN SATURATION: 96 % | BODY MASS INDEX: 24.66 KG/M2 | RESPIRATION RATE: 14 BRPM | HEIGHT: 65 IN | SYSTOLIC BLOOD PRESSURE: 179 MMHG | WEIGHT: 148 LBS | HEART RATE: 80 BPM | TEMPERATURE: 97.7 F

## 2024-02-09 LAB
CREAT SERPL-MCNC: 0.62 MG/DL (ref 0.51–0.95)
EGFRCR SERPLBLD CKD-EPI 2021: 89 ML/MIN/1.73M2
GLUCOSE SERPL-MCNC: 99 MG/DL (ref 70–99)
HGB BLD-MCNC: 12.6 G/DL (ref 11.7–15.7)

## 2024-02-09 PROCEDURE — 250N000013 HC RX MED GY IP 250 OP 250 PS 637: Performed by: NURSE PRACTITIONER

## 2024-02-09 PROCEDURE — 250N000013 HC RX MED GY IP 250 OP 250 PS 637: Performed by: FAMILY MEDICINE

## 2024-02-09 PROCEDURE — 250N000013 HC RX MED GY IP 250 OP 250 PS 637: Performed by: ORTHOPAEDIC SURGERY

## 2024-02-09 PROCEDURE — 82565 ASSAY OF CREATININE: CPT | Performed by: ORTHOPAEDIC SURGERY

## 2024-02-09 PROCEDURE — 36415 COLL VENOUS BLD VENIPUNCTURE: CPT | Performed by: ORTHOPAEDIC SURGERY

## 2024-02-09 PROCEDURE — 99232 SBSQ HOSP IP/OBS MODERATE 35: CPT | Performed by: NURSE PRACTITIONER

## 2024-02-09 PROCEDURE — 99231 SBSQ HOSP IP/OBS SF/LOW 25: CPT | Performed by: STUDENT IN AN ORGANIZED HEALTH CARE EDUCATION/TRAINING PROGRAM

## 2024-02-09 PROCEDURE — 82947 ASSAY GLUCOSE BLOOD QUANT: CPT | Performed by: ORTHOPAEDIC SURGERY

## 2024-02-09 PROCEDURE — 85018 HEMOGLOBIN: CPT | Performed by: ORTHOPAEDIC SURGERY

## 2024-02-09 RX ORDER — METHOCARBAMOL 500 MG/1
250 TABLET, FILM COATED ORAL EVERY 6 HOURS PRN
Qty: 20 TABLET | Refills: 0 | Status: SHIPPED | OUTPATIENT
Start: 2024-02-09

## 2024-02-09 RX ORDER — HYDROMORPHONE HYDROCHLORIDE 2 MG/1
2 TABLET ORAL EVERY 4 HOURS PRN
Qty: 20 TABLET | Refills: 0 | Status: SHIPPED | OUTPATIENT
Start: 2024-02-09

## 2024-02-09 RX ADMIN — PANTOPRAZOLE SODIUM 40 MG: 40 TABLET, DELAYED RELEASE ORAL at 06:25

## 2024-02-09 RX ADMIN — ASPIRIN 81 MG: 81 TABLET, COATED ORAL at 09:26

## 2024-02-09 RX ADMIN — ACETAMINOPHEN 975 MG: 325 TABLET ORAL at 06:25

## 2024-02-09 RX ADMIN — HYDROMORPHONE HYDROCHLORIDE 2 MG: 4 TABLET ORAL at 05:38

## 2024-02-09 RX ADMIN — DICLOFENAC 2 G: 10 GEL TOPICAL at 06:26

## 2024-02-09 RX ADMIN — SENNOSIDES AND DOCUSATE SODIUM 1 TABLET: 8.6; 5 TABLET ORAL at 09:26

## 2024-02-09 RX ADMIN — LEVOTHYROXINE SODIUM 100 MCG: 0.05 TABLET ORAL at 09:26

## 2024-02-09 RX ADMIN — HYDROMORPHONE HYDROCHLORIDE 2 MG: 4 TABLET ORAL at 12:37

## 2024-02-09 RX ADMIN — POLYETHYLENE GLYCOL 3350 17 G: 17 POWDER, FOR SOLUTION ORAL at 09:26

## 2024-02-09 RX ADMIN — LIDOCAINE: 50 OINTMENT TOPICAL at 12:38

## 2024-02-09 RX ADMIN — ACETAMINOPHEN 975 MG: 325 TABLET ORAL at 13:19

## 2024-02-09 ASSESSMENT — ACTIVITIES OF DAILY LIVING (ADL)
ADLS_ACUITY_SCORE: 28
DEPENDENT_IADLS:: INDEPENDENT
ADLS_ACUITY_SCORE: 28
ADLS_ACUITY_SCORE: 26
ADLS_ACUITY_SCORE: 28
ADLS_ACUITY_SCORE: 26

## 2024-02-09 NOTE — PROGRESS NOTES
Discharge AVS reviewed with patient and .  Questions answered and teachings acknowledged.  Belongings and paperwork sent with via  transport. Orthopedic [employer] acknowledged (YES)

## 2024-02-09 NOTE — CONSULTS
Care Management Discharge Note    Discharge Date: 02/09/2024       Discharge Disposition: Home, Home Care    Discharge Services: None    Discharge DME: None    Discharge Transportation: family or friend will provide    Private pay costs discussed: Not applicable    Does the patient's insurance plan have a 3 day qualifying hospital stay waiver?  Yes     Which insurance plan 3 day waiver is available? Alternative insurance waiver    Will the waiver be used for post-acute placement? No    PAS Confirmation Code:    Patient/family educated on Medicare website which has current facility and service quality ratings:      Education Provided on the Discharge Plan: Yes (AVS per bedside RN)  Persons Notified of Discharge Plans: pt  Patient/Family in Agreement with the Plan: yes    Handoff Referral Completed: Yes    Additional Information:  Pt to discharge to home with .  to transport.     [facility] to reach out to pt for home care needs.     Pt to follow up in clinic with Surgeon team.    Manuel Martinez RN

## 2024-02-09 NOTE — DISCHARGE INSTRUCTIONS
[facility] should be reaching out to you in the next 24-48 hours to set up a time to start services with you. If you don't hear from them you can reach them at [phone]
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
## 2024-02-09 NOTE — PROGRESS NOTES
[facility]    Medicine Progress Note - Hospitalist Service    Date of Admission:  2/7/2024    Assessment & Plan   Cristiane Birmingham is a 83 year old old female with a history of GERD and hypothyroidism who presented for an elective left hip conversion bipolar 2 total hip arthroplasty.  Doing well postoperative day #2.   Medically ready to discharge if cleared by orthopedics and therapy.     Changes today:  Medically stable for discharge      Left hip conversion bipolar total  Routine postoperative cares  PT and OT evaluation  Pain control  VTE prophylaxis per primary team     Hypothyroidism     GERD  Will schedule PPI daily while here in the hospital  Back to prehospital regimen at discharge     Hyperlipidemia          Diet: Advance Diet as Tolerated: Regular Diet Adult  Discharge Instruction - Regular Diet Adult    DVT Prophylaxis: Defer to primary service  Yuen Catheter: Not present  Lines: None     Cardiac Monitoring: None  Code Status: Full Code      Clinically Significant Risk Factors                  # Hypertension: Noted on problem list                   Disposition Plan      Expected Discharge Date: 02/09/2024, 12:00 PM    Destination: home with family              Saul Blanton MD  Hospitalist Service  [facility]  Securely message with [employer] (more info)  Text page via [employer] Paging/Directory   ______________________________________________________________________    Interval History   NAEO. Doing well. Tolerating PO intake. Denies any n/v.     Physical Exam   Vital Signs: Temp: 97.7  F (36.5  C) Temp src: Oral BP: (!) 179/85 Pulse: 80   Resp: 14 SpO2: 96 % O2 Device: None (Room air)    Weight: 148 lbs 0 oz    General Appearance: No apparent distress  Respiratory: Clear to auscultation bilaterally  Cardiovascular: Regular  GI: Abdomen is soft and nontender  Skin: Visualized skin is normal  Other: Eye contact with normal affect        20 MINUTES SPENT BY ME  on the date of service doing chart review, history, exam, documentation & further activities per the note.      Data     I have personally reviewed the following data over the past 24 hrs:    N/A  \   12.6   / N/A     N/A N/A N/A /  99   N/A N/A 0.62 \       Imaging results reviewed over the past 24 hrs:   No results found for this or any previous visit (from the past 24 hour(s)).

## 2024-02-09 NOTE — PLAN OF CARE
Problem: Adult Inpatient Plan of Care  Goal: Optimal Comfort and Wellbeing  Outcome: Progressing  Intervention: Monitor Pain and Promote Comfort    Problem: Hip Arthroplasty  Goal: Optimal Functional Ability  Outcome: Progressing  Intervention: Promote Optimal Functional Status     Goal Outcome Evaluation:       Patient vital signs are at baseline: Yes  Patient able to ambulate as they were prior to admission or with assist devices provided by therapies during their stay:  Yes  Patient MUST void prior to discharge:  Yes  Patient able to tolerate oral intake:  Yes  Pain has adequate pain control using Oral analgesics:  Yes  Does patient have an identified :  Yes  Has goal D/C date and time been discussed with patient:  Yes

## 2024-02-09 NOTE — PROGRESS NOTES
[facility] ACUTE PAIN SERVICE    Daily PAIN Progress Note    Assessment/Plan:  Cristiane Birmingham is a 83 year old female who was admitted on 2/7/2024.  Pain team was asked to see the patient for operative pain. Admitted for planned procedure. History of failed total hip arthroplasty, closed fracture of the left femur, closed intertrochanteric fracture of the left hip, hypertension, degenerative arthritis, diverticulitis, GERD, hyperlipidemia, hypothyroidism. The patient does not smoke and denies chemical dependency history.      Post op day: 2 Day Post-Op. LEFT HIP CONVERSION BIPOLAR TO TOTAL HIP ARTHROPLASTY POSTERIOR APPROACH     Pain team will sign off.  Plans for discharge.  Pain is well-controlled.  Discussed with orthopedics     PLAN:   1) Pain is consistent with postoperative pain. Labs and imaging indicated: I have personally reviewed pertinent notes, labs, tests, and radiologic imaging in patient's chart. Treatment plan includes multimodal pain approach, Hospital Medicine Service for medical management, orthopedics, PT, OT. Patient educated regarding multimodal pain approach, medications as listed below, discharge medications, stool softeners with opioids, follow up. Patient is understanding of the plan. All questions and concerns addressed to patient's satisfaction.   2)Multimodal Medication Therapy  Topical: lidocaine ointment alternating with voltaren gel   NSAID'S: None given age, history of diverticulitis  Muscle Relaxants: robaxin 250 mg q6h prn  Adjuvants: Acetaminophen 1 g every 8 hours, Gabapentin 200 mg at bedtime   Antidepressants/anxiolytics: Hydroxyzine 10 mg 3 times daily as needed  Opioids: Dilaudid 2-4 mg every 4 hours as needed  IV Pain medication: Dilaudid 0.2-0.4 mg every 2 hours as needed - discontinue   3)Non-medication interventions: Ice, rest  4)Constipation Prophylaxis: Scheduled and prn:      -Opioid prescriber has been none recent  -[place]  pulled from system on 2/8/2024. This  "indicates 11 prescriptions for hydromorphone filled from March 2023 through September 2023.  9/1/2023 hydromorphone 4 mg #28 fill for 7 days primary care provider Wes Lyon MD -same on 8/11/2023 7/20/2023 hydromorphone 2 mg #28 fill for 7 days primary care provider Wes Lyon MD -same on 6/27/23  Discharge Recommendations - We recommend prescribing the following at the time of discharge:  Per orthopedics -recommend gabapentin 200 mg at bedtime for 1 week, acetaminophen, Dilaudid, Robaxin, topicals.  Follow-up orthopedics for postsurgical follow-up.  Follow-up primary care provider for posthospital follow-up.    Subjective:  Describes pain as 2/10 at rest and 4/10 with movement.  Pain is described as aching, sore in the left hip. The patient denies nausea, vomiting, chest pain, dizziness, shortness of breath, difficulty with urination. The patient reports no bowel movement yet but is passing flatus.      Principal Problem:    Failed total hip arthroplasty  (H24)      Objective:  Vital signs in last 24 hours:  BP (!) 179/85 (BP Location: Left arm)   Pulse 80   Temp 97.7  F (36.5  C) (Oral)   Resp 14   Ht 1.651 m (5' 5\")   Wt 67.1 kg (148 lb)   SpO2 96%   BMI 24.63 kg/m    Weight:     Vitals:    01/29/24 1000 02/07/24 0619   Weight: 68 kg (150 lb) 67.1 kg (148 lb)      Weight change:   Body mass index is 24.63 kg/m .    Intake/Output last 3 shifts:  I/O last 3 completed shifts:  In: 960 [P.O.:960]  Out: 2100 [Urine:2100]  Intake/Output this shift:  I/O this shift:  In: 120 [P.O.:120]  Out: 400 [Urine:400]    Review of Systems:   As per subjective, all others negative.    Physical Exam:  General Appearance:  Alert, cooperative, no distress, appears stated age, resting in bed   Head:  Normocephalic, without obvious abnormality, atraumatic   Eyes:  PERRL, conjunctiva/corneas clear, EOM's intact   Nose: Nares normal, septum midline   Throat: Lips, mucosa, and tongue normal; teeth and gums normal "   Neck: Supple, symmetrical, trachea midline   Lungs:   Clear to auscultation bilaterally, respirations unlabored, room air   Heart:  Regular rate and rhythm, S1, S2 normal    Abdomen:   Soft, non-tender   Extremities: Incision is covered   Skin: Skin warm, dry   Neurologic: Alert and oriented X 3, Moves all 4 extremities     Imaging: Reviewed I have personally reviewed pertinent labs, tests, and radiologic imaging in patient's chart.      Labs: Reviewed I have personally reviewed pertinent labs, tests, and radiologic imaging in patient's chart.      Total time spent 35 minutes with greater than 50% in consultation, education and coordination of care.   Also discussed with RN, orthopedics.   Elements of Medical Decision Making as described above. Acute or chronic illness or injury or surgery. High risk therapy including opioids, high risk drug therapy including oral and/or parenteral controlled substances.       KUMAR MartellP-FLORA  Acute Care Pain Management Program [facility]   Monday-Friday 8a-4p   Page via Epic or [employer]

## 2024-10-29 ENCOUNTER — LAB REQUISITION (OUTPATIENT)
Dept: LAB | Facility: CLINIC | Age: 84
End: 2024-10-29

## 2024-10-29 DIAGNOSIS — R00.2 PALPITATIONS: ICD-10-CM

## 2024-10-29 LAB
ERYTHROCYTE [DISTWIDTH] IN BLOOD BY AUTOMATED COUNT: 13 % (ref 10–15)
HCT VFR BLD AUTO: 43.6 % (ref 35–47)
HGB BLD-MCNC: 14 G/DL (ref 11.7–15.7)
MCH RBC QN AUTO: 31.6 PG (ref 26.5–33)
MCHC RBC AUTO-ENTMCNC: 32.1 G/DL (ref 31.5–36.5)
MCV RBC AUTO: 98 FL (ref 78–100)
PLATELET # BLD AUTO: 246 10E3/UL (ref 150–450)
RBC # BLD AUTO: 4.43 10E6/UL (ref 3.8–5.2)
WBC # BLD AUTO: 9.1 10E3/UL (ref 4–11)

## 2024-10-29 PROCEDURE — 84443 ASSAY THYROID STIM HORMONE: CPT | Performed by: PHYSICIAN ASSISTANT

## 2024-10-29 PROCEDURE — 85014 HEMATOCRIT: CPT | Performed by: PHYSICIAN ASSISTANT

## 2024-10-30 LAB — TSH SERPL DL<=0.005 MIU/L-ACNC: 2.27 UIU/ML (ref 0.3–4.2)

## 2025-02-01 ENCOUNTER — HEALTH MAINTENANCE LETTER (OUTPATIENT)
Age: 85
End: 2025-02-01

## 2025-03-19 ENCOUNTER — LAB REQUISITION (OUTPATIENT)
Dept: LAB | Facility: CLINIC | Age: 85
End: 2025-03-19

## 2025-03-19 DIAGNOSIS — I10 ESSENTIAL (PRIMARY) HYPERTENSION: ICD-10-CM

## 2025-03-19 LAB
BASOPHILS # BLD AUTO: 0 10E3/UL (ref 0–0.2)
BASOPHILS NFR BLD AUTO: 0 %
EOSINOPHIL # BLD AUTO: 0.1 10E3/UL (ref 0–0.7)
EOSINOPHIL NFR BLD AUTO: 1 %
ERYTHROCYTE [DISTWIDTH] IN BLOOD BY AUTOMATED COUNT: 14 % (ref 10–15)
HCT VFR BLD AUTO: 47.8 % (ref 35–47)
HGB BLD-MCNC: 15.4 G/DL (ref 11.7–15.7)
IMM GRANULOCYTES # BLD: 0 10E3/UL
IMM GRANULOCYTES NFR BLD: 0 %
LYMPHOCYTES # BLD AUTO: 2.2 10E3/UL (ref 0.8–5.3)
LYMPHOCYTES NFR BLD AUTO: 32 %
MCH RBC QN AUTO: 31.4 PG (ref 26.5–33)
MCHC RBC AUTO-ENTMCNC: 32.2 G/DL (ref 31.5–36.5)
MCV RBC AUTO: 98 FL (ref 78–100)
MONOCYTES # BLD AUTO: 0.5 10E3/UL (ref 0–1.3)
MONOCYTES NFR BLD AUTO: 7 %
NEUTROPHILS # BLD AUTO: 4.1 10E3/UL (ref 1.6–8.3)
NEUTROPHILS NFR BLD AUTO: 59 %
NRBC # BLD AUTO: 0 10E3/UL
NRBC BLD AUTO-RTO: 0 /100
PLATELET # BLD AUTO: 235 10E3/UL (ref 150–450)
RBC # BLD AUTO: 4.9 10E6/UL (ref 3.8–5.2)
WBC # BLD AUTO: 6.9 10E3/UL (ref 4–11)

## 2025-03-19 PROCEDURE — 85025 COMPLETE CBC W/AUTO DIFF WBC: CPT | Performed by: PHYSICIAN ASSISTANT

## 2025-03-19 PROCEDURE — 80061 LIPID PANEL: CPT | Performed by: PHYSICIAN ASSISTANT

## 2025-03-19 PROCEDURE — 80053 COMPREHEN METABOLIC PANEL: CPT | Performed by: PHYSICIAN ASSISTANT

## 2025-03-20 LAB
ALBUMIN SERPL BCG-MCNC: 4.2 G/DL (ref 3.5–5.2)
ALP SERPL-CCNC: 107 U/L (ref 40–150)
ALT SERPL W P-5'-P-CCNC: 25 U/L (ref 0–50)
ANION GAP SERPL CALCULATED.3IONS-SCNC: 11 MMOL/L (ref 7–15)
AST SERPL W P-5'-P-CCNC: 21 U/L (ref 0–45)
BILIRUB SERPL-MCNC: 0.4 MG/DL
BUN SERPL-MCNC: 17.2 MG/DL (ref 8–23)
CALCIUM SERPL-MCNC: 9.7 MG/DL (ref 8.8–10.4)
CHLORIDE SERPL-SCNC: 105 MMOL/L (ref 98–107)
CHOLEST SERPL-MCNC: 185 MG/DL
CREAT SERPL-MCNC: 0.74 MG/DL (ref 0.51–0.95)
EGFRCR SERPLBLD CKD-EPI 2021: 80 ML/MIN/1.73M2
FASTING STATUS PATIENT QL REPORTED: NO
FASTING STATUS PATIENT QL REPORTED: NO
GLUCOSE SERPL-MCNC: 137 MG/DL (ref 70–99)
HCO3 SERPL-SCNC: 25 MMOL/L (ref 22–29)
HDLC SERPL-MCNC: 53 MG/DL
LDLC SERPL CALC-MCNC: 105 MG/DL
NONHDLC SERPL-MCNC: 132 MG/DL
POTASSIUM SERPL-SCNC: 3.8 MMOL/L (ref 3.4–5.3)
PROT SERPL-MCNC: 6.8 G/DL (ref 6.4–8.3)
SODIUM SERPL-SCNC: 141 MMOL/L (ref 135–145)
TRIGL SERPL-MCNC: 135 MG/DL

## (undated) DEVICE — SOLUTION IRRIG 2B7127 .9NS 3000ML BAG

## (undated) DEVICE — CEMENT PRESSURIZER FEMORAL CANAL SM

## (undated) DEVICE — SU STRATAFIX PDS PLUS 0 CT-1 30CM SXPP1B450

## (undated) DEVICE — SUTURE VICRYL+ 2-0 27IN CT-1 UND VCP259H

## (undated) DEVICE — POSITIONER ABDUCTION PILLOW FOAM MED FP-ABDUCTM

## (undated) DEVICE — GLOVE UNDER INDICATOR PI SZ 8.5 LF 41685

## (undated) DEVICE — GLOVE BIOGEL PI INDICATOR 8.0 LF 41680

## (undated) DEVICE — BONE CLEANING TIP INTERPULSE  0210-010-000

## (undated) DEVICE — ADH SKIN CLOSURE PREMIERPRO EXOFIN 1.0ML 3470

## (undated) DEVICE — SUTURE VICRYL+ 0 27IN CT-1 UND VCP260H

## (undated) DEVICE — GLOVE BIOGEL PI ULTRATOUCH G SZ 6.5 42165

## (undated) DEVICE — GLOVE BIOGEL PI SZ 6.5 40865

## (undated) DEVICE — BLADE SAGITTAL WIDE (SO-618) 2108-118

## (undated) DEVICE — SUCTION MANIFOLD NEPTUNE 2 SYS 4 PORT 0702-020-000

## (undated) DEVICE — GLOVE BIOGEL PI ULTRATOUCH G SZ 8.0 42180

## (undated) DEVICE — SU FIBERWIRE 2 38" T-8 NDL  AR-7206

## (undated) DEVICE — SU MONOCRYL 3-0 PS-2 18" UND MCP497G

## (undated) DEVICE — SOL WATER IRRIG 1000ML BOTTLE 2F7114

## (undated) DEVICE — PAD HIP POSITIONING MCGUIRE 707-CPM

## (undated) DEVICE — GOWN IMPERVIOUS BREATHABLE SMART XLG 89045

## (undated) DEVICE — SUCTION IRR SYSTEM W/O TIP INTERPULSE HANDPIECE 0210-100-000

## (undated) DEVICE — HOLDER LIMB VELCRO OR 0814-1533

## (undated) DEVICE — SU STRATAFIX PDS PLUS 2 CTX SPIRAL L14 IN SXPP2B405

## (undated) DEVICE — GLOVE BIOGEL INDICATOR 7.5 LF 41675

## (undated) DEVICE — GUIDE PIN SHORT PELVIS

## (undated) DEVICE — SUTURE MONOCRYL+ 2-0 CT-1 36" UNDYED MCP945H

## (undated) DEVICE — GLOVE BIOGEL PI ULTRATOUCH G SZ 7.5 42175

## (undated) DEVICE — GLOVE SURG PI ULTRA TOUCH M SZ 8-1/2 LF

## (undated) DEVICE — SUTURE MONOCRYL 3-0 18 PS2 UND MCP497G

## (undated) DEVICE — SOL NACL 0.9% INJ 1000ML BAG 2B1324X

## (undated) DEVICE — PINNACLE HIP SOLUTIONS ALTRX POLYETHYLENE ACETABULAR LINER +4 NEUTRAL 32MM ID 48MM OD
Type: IMPLANTABLE DEVICE | Site: HIP | Status: NON-FUNCTIONAL
Brand: PINNACLE ALTRX

## (undated) DEVICE — DRESSING MEPILEX AG SILVER 4X8 395890

## (undated) DEVICE — SU ETHIBOND 5 V-37 4X30" MB66G

## (undated) DEVICE — ELECTRODE PATIENT RETURN ADULT L10 FT 2 PLATE CORD 0855C

## (undated) DEVICE — BONE CLEANING TIP INTERPULSE FEMORAL CANAL 0210-008-000

## (undated) DEVICE — DRAPE IOBAN INCISE 23X17" 6650EZ

## (undated) DEVICE — PLATE GROUNDING ADULT W/CORD 9165L

## (undated) DEVICE — A3 SUPPLIES- SEE NURSING INFO PAGE

## (undated) DEVICE — TAMPON SUCTION FEMORAL CANAL 110037

## (undated) DEVICE — SOL NACL 0.9% IRRIG 1000ML BOTTLE 2F7124

## (undated) DEVICE — GLOVE UNDER INDICATOR PI SZ 7.0 LF 41670

## (undated) DEVICE — DRAPE IOBAN INCISE 36X23" 6651EZ

## (undated) DEVICE — BONE CEMENT MIXEVAC HI VAC W/CARTRIDGE 0306-563-000

## (undated) DEVICE — CUSTOM PACK TOTAL HIP SOP5BTHHEA

## (undated) DEVICE — SU NDL MAYO 1/2 CIRCLE TROCAR 1826-4D

## (undated) RX ORDER — ONDANSETRON 2 MG/ML
INJECTION INTRAMUSCULAR; INTRAVENOUS
Status: DISPENSED
Start: 2024-02-07

## (undated) RX ORDER — LABETALOL HYDROCHLORIDE 5 MG/ML
INJECTION, SOLUTION INTRAVENOUS
Status: DISPENSED
Start: 2023-03-12

## (undated) RX ORDER — BUPIVACAINE HYDROCHLORIDE 5 MG/ML
INJECTION, SOLUTION EPIDURAL; INTRACAUDAL
Status: DISPENSED
Start: 2024-02-07

## (undated) RX ORDER — PHENYLEPHRINE HYDROCHLORIDE 10 MG/ML
INJECTION INTRAVENOUS
Status: DISPENSED
Start: 2024-02-07

## (undated) RX ORDER — PROPOFOL 10 MG/ML
INJECTION, EMULSION INTRAVENOUS
Status: DISPENSED
Start: 2024-02-07

## (undated) RX ORDER — KETAMINE HYDROCHLORIDE 10 MG/ML
INJECTION INTRAMUSCULAR; INTRAVENOUS
Status: DISPENSED
Start: 2023-03-11

## (undated) RX ORDER — LIDOCAINE HYDROCHLORIDE 10 MG/ML
INJECTION, SOLUTION EPIDURAL; INFILTRATION; INTRACAUDAL; PERINEURAL
Status: DISPENSED
Start: 2024-02-07

## (undated) RX ORDER — VANCOMYCIN HYDROCHLORIDE 1 G/20ML
INJECTION, POWDER, LYOPHILIZED, FOR SOLUTION INTRAVENOUS
Status: DISPENSED
Start: 2023-03-11

## (undated) RX ORDER — FENTANYL CITRATE 50 UG/ML
INJECTION, SOLUTION INTRAMUSCULAR; INTRAVENOUS
Status: DISPENSED
Start: 2024-02-07

## (undated) RX ORDER — FENTANYL CITRATE 50 UG/ML
INJECTION, SOLUTION INTRAMUSCULAR; INTRAVENOUS
Status: DISPENSED
Start: 2023-03-11

## (undated) RX ORDER — EPHEDRINE SULFATE 50 MG/ML
INJECTION, SOLUTION INTRAMUSCULAR; INTRAVENOUS; SUBCUTANEOUS
Status: DISPENSED
Start: 2023-03-12